# Patient Record
Sex: MALE | Race: WHITE | NOT HISPANIC OR LATINO | Employment: OTHER | URBAN - METROPOLITAN AREA
[De-identification: names, ages, dates, MRNs, and addresses within clinical notes are randomized per-mention and may not be internally consistent; named-entity substitution may affect disease eponyms.]

---

## 2017-04-18 ENCOUNTER — OFFICE VISIT (OUTPATIENT)
Dept: CARDIOLOGY | Facility: CLINIC | Age: 82
End: 2017-04-18

## 2017-04-18 VITALS
HEIGHT: 70 IN | HEART RATE: 82 BPM | WEIGHT: 187 LBS | BODY MASS INDEX: 26.77 KG/M2 | DIASTOLIC BLOOD PRESSURE: 72 MMHG | SYSTOLIC BLOOD PRESSURE: 118 MMHG

## 2017-04-18 DIAGNOSIS — Z95.0 PACEMAKER: ICD-10-CM

## 2017-04-18 DIAGNOSIS — I48.20 CHRONIC ATRIAL FIBRILLATION (HCC): Primary | ICD-10-CM

## 2017-04-18 NOTE — PROGRESS NOTES
"Primary Provider:  Anthony Fried MD  CC:Afib    Problem List:    Patient Active Problem List   Diagnosis   • Atrial fibrillation   • Syncopal episodes   • Hypertension   • Dyslipidemia   • Diabetes mellitus, type 2   • Right bundle branch block   • DJD (degenerative joint disease)   • CKD (chronic kidney disease)   • Ventral hernia   • CANDY on CPAP   • Pacemaker       HPI:  The patient returns for follow up regarding afib, pacemaker.  He denies any chest pain, sob, or CHF symptoms.     No Known Allergies    Current Outpatient Prescriptions   Medication Sig Dispense Refill   • amLODIPine (NORVASC) 10 MG tablet Take 10 mg by mouth Daily.  0   • aspirin 81 MG tablet Take 81 mg by mouth Daily.     • benazepril (LOTENSIN) 20 MG tablet Take 20 mg by mouth Daily.  1   • fenofibrate 160 MG tablet Take 160 mg by mouth Daily.  0   • hydrochlorothiazide (HYDRODIURIL) 25 MG tablet Take 25 mg by mouth Daily.  1   • metFORMIN XR (GLUCOPHAGE-XR) 500 MG 24 hr tablet Take 500 mg by mouth Daily.  1   • oxyCODONE-acetaminophen (PERCOCET) 5-325 MG per tablet As Needed.  0   • pravastatin (PRAVACHOL) 20 MG tablet Take 20 mg by mouth Daily.  1   • XARELTO 15 MG tablet Take 1 tablet by mouth Daily With Dinner. 30 tablet 5     No current facility-administered medications for this visit.        /72 (BP Location: Right arm, Patient Position: Sitting)  Pulse 82  Ht 70\" (177.8 cm)  Wt 187 lb (84.8 kg)  BMI 26.83 kg/m2      Device Check    Company BSC  Mode VVIR  Lower Rate 80bpm  Upper rate 120bpm         Thresholds  Right Ventricular Pacing 0.7Volts@0.5ms  Right Ventricular Sensing 7.5mV  Right Ventricular Impedence 609Ohms  Percent Pacing 97    Battery Voltage -Volts  Longevity Eight years  Episodes Afib    Reprogramming No changes    Comments Stable pacemaker check.     Diagnosis Plan   1. Chronic atrial fibrillation  Stable pacemaker check.  Will recheck in 6 months or sooner as needed.    2. Pacemaker       Will Garcia ZHANG"

## 2017-05-12 ENCOUNTER — OFFICE VISIT (OUTPATIENT)
Dept: CARDIOLOGY | Facility: CLINIC | Age: 82
End: 2017-05-12

## 2017-05-12 VITALS
HEIGHT: 70 IN | WEIGHT: 185.4 LBS | SYSTOLIC BLOOD PRESSURE: 116 MMHG | BODY MASS INDEX: 26.54 KG/M2 | HEART RATE: 97 BPM | DIASTOLIC BLOOD PRESSURE: 86 MMHG

## 2017-05-12 DIAGNOSIS — R55 SYNCOPE, UNSPECIFIED SYNCOPE TYPE: ICD-10-CM

## 2017-05-12 DIAGNOSIS — G47.33 OSA ON CPAP: ICD-10-CM

## 2017-05-12 DIAGNOSIS — Z99.89 OSA ON CPAP: ICD-10-CM

## 2017-05-12 DIAGNOSIS — I48.20 CHRONIC ATRIAL FIBRILLATION (HCC): Primary | ICD-10-CM

## 2017-05-12 DIAGNOSIS — E78.5 DYSLIPIDEMIA: ICD-10-CM

## 2017-05-12 DIAGNOSIS — Z95.0 PACEMAKER: ICD-10-CM

## 2017-05-12 DIAGNOSIS — N18.3 CKD (CHRONIC KIDNEY DISEASE), STAGE 3 (MODERATE): ICD-10-CM

## 2017-05-12 DIAGNOSIS — E11.8 TYPE 2 DIABETES MELLITUS WITH COMPLICATION, WITHOUT LONG-TERM CURRENT USE OF INSULIN (HCC): ICD-10-CM

## 2017-05-12 PROCEDURE — 99213 OFFICE O/P EST LOW 20 MIN: CPT | Performed by: INTERNAL MEDICINE

## 2017-08-18 RX ORDER — RIVAROXABAN 15 MG/1
TABLET, FILM COATED ORAL
Qty: 30 TABLET | Refills: 5 | Status: SHIPPED | OUTPATIENT
Start: 2017-08-18 | End: 2018-01-01 | Stop reason: SDUPTHER

## 2017-08-24 ENCOUNTER — CLINICAL SUPPORT NO REQUIREMENTS (OUTPATIENT)
Dept: CARDIOLOGY | Facility: CLINIC | Age: 82
End: 2017-08-24

## 2017-08-24 DIAGNOSIS — I48.91 ATRIAL FIBRILLATION, UNSPECIFIED TYPE (HCC): ICD-10-CM

## 2017-08-24 PROCEDURE — 93296 REM INTERROG EVL PM/IDS: CPT | Performed by: INTERNAL MEDICINE

## 2017-08-24 PROCEDURE — 93294 REM INTERROG EVL PM/LDLS PM: CPT | Performed by: INTERNAL MEDICINE

## 2017-11-29 ENCOUNTER — OFFICE VISIT (OUTPATIENT)
Dept: CARDIOLOGY | Facility: CLINIC | Age: 82
End: 2017-11-29

## 2017-11-29 VITALS
DIASTOLIC BLOOD PRESSURE: 74 MMHG | HEIGHT: 70 IN | HEART RATE: 85 BPM | WEIGHT: 193.8 LBS | SYSTOLIC BLOOD PRESSURE: 118 MMHG | BODY MASS INDEX: 27.75 KG/M2

## 2017-11-29 DIAGNOSIS — Z99.89 OSA ON CPAP: ICD-10-CM

## 2017-11-29 DIAGNOSIS — G47.33 OSA ON CPAP: ICD-10-CM

## 2017-11-29 DIAGNOSIS — N18.30 STAGE 3 CHRONIC KIDNEY DISEASE (HCC): ICD-10-CM

## 2017-11-29 DIAGNOSIS — I49.5 SICK SINUS SYNDROME (HCC): Primary | ICD-10-CM

## 2017-11-29 DIAGNOSIS — E78.5 DYSLIPIDEMIA: ICD-10-CM

## 2017-11-29 DIAGNOSIS — E11.8 TYPE 2 DIABETES MELLITUS WITH COMPLICATION, WITHOUT LONG-TERM CURRENT USE OF INSULIN (HCC): ICD-10-CM

## 2017-11-29 PROCEDURE — 99214 OFFICE O/P EST MOD 30 MIN: CPT | Performed by: INTERNAL MEDICINE

## 2017-11-29 RX ORDER — ERYTHROMYCIN 5 MG/G
OINTMENT OPHTHALMIC NIGHTLY PRN
COMMUNITY
End: 2019-08-13

## 2017-11-29 NOTE — PROGRESS NOTES
Subjective:     Encounter Date:11/29/2017    Patient ID: Micha Crockett is an 82 y.o.  white male, a , from [place], [place].       PHYSICIAN: Anthony Fried MD  PREVIOUS CARDIOLOGIST: Gypsy Gibbs MD  ELECTROPHYSIOLOGIST: ROSIBEL  ORTHOPEDIST: Vitaly Omer MD    Chief Complaint:   Chief Complaint   Patient presents with   • Atrial Fibrillation     Problem List:  1. Remote paroxysmal atrial fibrillation with now chronic sustained atrial fibrillation and progressive sick sinus syndrome:  a. Diagnosed in [facility], in April 2014.   b. CHADS-VASc score of 4 with anticoagulation on Xarelto.  c. Successful cardioversion with restoration of sinus rhythm with advanced trifascicular block with SD interval of 292 msec in the setting of CRBB/LAHB, on 04/29/2014, by Dr. Slaughter.   d. Return of atrial fibrillation with Holter monitor.  e. Holter monitor for 48 hours with a minimum heart rate of 42 beats per minute and max of 138 beats per minute, with ventricular ectopy less than 1% of the time and supraventricular ectopy 2% of the time, 03/27/2015.  f. Echocardiogram showing estimated EF of 61% with mild-to-moderate LVH and left atrium slightly dilated, and right ventricle slightly dilated, and moderate aortic cuff sclerosis, and mild aortic regurgitation with mild mitral regurgitation, and mild-to-moderate tricuspid regurgitation.  g. Implantation of [employer] single-chamber permanent pacemaker on 07/13/2015 by Dr. Cano, with acceptable interrogation and no reprogramming, April 2017, with subsequent acceptable remote check, August 2017.  2. Syncopal episodes secondary to bradycardia.   3. Hypertension.   4. Dyslipidemia.   5. Diabetes mellitus type 2.   6. Chronic right bundle branch block.   7. Degenerative joint disease.   8. Ventral hernia.   9. Chronic kidney disease.   10. Obstructive sleep apnea with the use of CPAP.   11. Surgical  history:  a. Appendectomy.   b. Pilonidal cyst.  c. 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  d. Prostatectomy.       No Known Allergies      Current Outpatient Prescriptions:   •  amLODIPine (NORVASC) 10 MG tablet, Take 10 mg by mouth Daily., Disp: , Rfl: 0  •  aspirin 81 MG tablet, Take 81 mg by mouth Daily., Disp: , Rfl:   •  benazepril (LOTENSIN) 20 MG tablet, Take 20 mg by mouth Daily., Disp: , Rfl: 1  •  betamethasone valerate (VALISONE) 0.1 % cream, APPLY TO AFFECTED AREA 3 TIMES A DAY AND AS NEEDED FOR ITCHING, Disp: , Rfl: 0  •  erythromycin (ROMYCIN) 5 MG/GM ophthalmic ointment, Administer  to both eyes At Night As Needed., Disp: , Rfl:   •  fenofibrate 160 MG tablet, Take 160 mg by mouth Daily., Disp: , Rfl: 0  •  hydrochlorothiazide (HYDRODIURIL) 25 MG tablet, Take 12.5 mg by mouth Daily., Disp: , Rfl: 1  •  metFORMIN XR (GLUCOPHAGE-XR) 500 MG 24 hr tablet, Take 500 mg by mouth Daily., Disp: , Rfl: 1  •  pravastatin (PRAVACHOL) 20 MG tablet, Take 20 mg by mouth Daily., Disp: , Rfl: 1  •  XARELTO 15 MG tablet, TAKE 1 TABLET BY MOUTH DAILY WITH DINNER, Disp: 30 tablet, Rfl: 5    HISTORY OF PRESENT ILLNESS: Patient returns for scheduled 6-month followup. He states that his garden did well this year.  His wife accompanies him today, and she says that she went to Dr. Fried's office and picked up her 's lab results from earlier this month and put it in the glove compartment, but it is not there.  They will try to get another copy and forward that to our office.  The patient notes that Dr. Fried was pleased with the results.  He has had no symptoms of chest pain, tightness, or pressure with his activities.  He has no symptoms from a cardiopulmonary perspective with his daily activities.  He does not complain of any edema.  He has had influenza immunization this fall.  Patient otherwise denies chest pain, shortness of breath, PND, edema, palpitations, syncope or presyncope at this  "time.        ROS   Obtained and otherwise negative except as outlined in problem list and HPI.    Procedures       Objective:       Vitals:    11/29/17 1502 11/29/17 1505   BP: 123/78 118/74   BP Location: Left arm Left arm   Patient Position: Standing Sitting   Pulse: 96 85   Weight: 193 lb 12.8 oz (87.9 kg)    Height: 70\" (177.8 cm)      Body mass index is 27.81 kg/(m^2).   Last weight:  185 lbs.    Physical Exam   Constitutional: He is oriented to person, place, and time. He appears well-developed and well-nourished.   Neck: No JVD present. Carotid bruit is not present. No thyromegaly present.   Cardiovascular: Regular rhythm, S1 normal and S2 normal.  Exam reveals no gallop, no S3 and no friction rub.    Murmur heard.   Medium-pitched early systolic murmur is present with a grade of 2/6  at the lower left sternal border  Pulses:       Carotid pulses are 1+ on the right side, and 1+ on the left side.       Radial pulses are 1+ on the right side, and 1+ on the left side.        Femoral pulses are 1+ on the right side, and 1+ on the left side.       Popliteal pulses are 1+ on the right side, and 1+ on the left side.        Dorsalis pedis pulses are 1+ on the right side, and 1+ on the left side.        Posterior tibial pulses are 1+ on the right side, and 1+ on the left side.   Pulmonary/Chest: Effort normal. He has decreased breath sounds. He has no wheezes. He has no rhonchi. He has no rales.   Left precordial pacemaker site is nominal   Abdominal: Soft. He exhibits no mass. There is no hepatosplenomegaly. There is no tenderness. There is no guarding.   Bowel sounds audible x4   Musculoskeletal: Normal range of motion. He exhibits no edema.   Lymphadenopathy:     He has no cervical adenopathy.   Neurological: He is alert and oriented to person, place, and time.   Skin: Skin is warm, dry and intact. No rash noted.   Vitals reviewed.        Lab Review:   Lab Results   Component Value Date    GLUCOSE 107 (H) " 07/15/2015    BUN 28 (H) 07/15/2015    CREATININE 1.4 (H) 07/15/2015    CO2 29 07/15/2015    CALCIUM 8.8 07/15/2015    ALBUMIN 4.0 07/12/2015    AST 17 07/12/2015    ALT 16 07/12/2015       Lab Results   Component Value Date    WBC 10.47 07/14/2015    HGB 12.0 (L) 07/14/2015    HCT 37.3 (L) 07/14/2015    MCV 86.9 07/14/2015     07/14/2015       Lab Results   Component Value Date    HGBA1C 6.7 (H) 07/12/2015       Lab Results   Component Value Date    TSH 3.548 07/12/2015       Lab Results   Component Value Date     (H) 07/12/2015           Assessment:   Overall continued acceptable course with no interim cardiopulmonary complaints with acceptable functional status. We will defer additional diagnostic or therapeutic intervention from a cardiac perspective at this time.  Hopefully, we will be allowed to review his most recent laboratory results with him by letter.       Diagnosis Plan   1. Sick sinus syndrome  Acceptable course   2. CANDY on CPAP  Compliance stressed   3. Type 2 diabetes mellitus with complication, without long-term current use of insulin  No data to review   4. Dyslipidemia  No data to review   5. Stage 3 chronic kidney disease  No data to review          Plan:         1. Patient to continue current medications and close follow up with the above providers.  2. Tentative cardiology follow up in May 2018 with a pacemaker check, or patient may return sooner PRN.       Transcribed by Mirna Damon for Dr. Daniel Slaughter at 3:17 PM on 11/29/2017    I, Daniel Slaughter MD, [facility], personally performed the services described in this documentation as scribed by the above named individual in my presence, and it is both accurate and complete. At 3:59 PM on 11/29/2017

## 2018-01-02 RX ORDER — RIVAROXABAN 15 MG/1
TABLET, FILM COATED ORAL
Qty: 30 TABLET | Refills: 3 | Status: SHIPPED | OUTPATIENT
Start: 2018-01-02 | End: 2018-05-08 | Stop reason: SDUPTHER

## 2018-02-22 ENCOUNTER — CLINICAL SUPPORT NO REQUIREMENTS (OUTPATIENT)
Dept: CARDIOLOGY | Facility: CLINIC | Age: 83
End: 2018-02-22

## 2018-02-22 DIAGNOSIS — I48.20 CHRONIC ATRIAL FIBRILLATION (HCC): ICD-10-CM

## 2018-02-22 DIAGNOSIS — I49.5 SICK SINUS SYNDROME (HCC): ICD-10-CM

## 2018-02-22 PROCEDURE — 93294 REM INTERROG EVL PM/LDLS PM: CPT | Performed by: INTERNAL MEDICINE

## 2018-02-22 PROCEDURE — 93296 REM INTERROG EVL PM/IDS: CPT | Performed by: INTERNAL MEDICINE

## 2018-03-28 ENCOUNTER — ANESTHESIA EVENT (OUTPATIENT)
Dept: ENDOSCOPY | Age: 83
End: 2018-03-28
Payer: MEDICARE

## 2018-03-29 ENCOUNTER — ANESTHESIA (OUTPATIENT)
Dept: ENDOSCOPY | Age: 83
End: 2018-03-29
Payer: MEDICARE

## 2018-03-29 ENCOUNTER — HOSPITAL ENCOUNTER (OUTPATIENT)
Age: 83
Setting detail: OUTPATIENT SURGERY
Discharge: HOME | End: 2018-03-29
Attending: INTERNAL MEDICINE | Admitting: INTERNAL MEDICINE
Payer: MEDICARE

## 2018-03-29 VITALS
TEMPERATURE: 96.8 F | BODY MASS INDEX: 27.3 KG/M2 | DIASTOLIC BLOOD PRESSURE: 70 MMHG | SYSTOLIC BLOOD PRESSURE: 135 MMHG | OXYGEN SATURATION: 97 % | WEIGHT: 195 LBS | RESPIRATION RATE: 20 BRPM | HEIGHT: 71 IN | HEART RATE: 70 BPM

## 2018-03-29 LAB
ANION GAP SERPL CALC-SCNC: 8 MMOL/L (ref 3–18)
APPEARANCE UR: CLEAR
BACTERIA URNS QL MICRO: ABNORMAL /HPF
BILIRUB UR QL: NEGATIVE
BUN BLD-MCNC: 26 MG/DL (ref 7–18)
BUN SERPL-MCNC: 17 MG/DL (ref 7–18)
BUN/CREAT SERPL: 13 (ref 12–20)
CALCIUM SERPL-MCNC: 10.7 MG/DL (ref 8.5–10.1)
CHLORIDE BLD-SCNC: 101 MMOL/L (ref 100–108)
CHLORIDE SERPL-SCNC: 104 MMOL/L (ref 100–108)
CO2 SERPL-SCNC: 27 MMOL/L (ref 21–32)
COLOR UR: YELLOW
CREAT SERPL-MCNC: 1.27 MG/DL (ref 0.6–1.3)
EPITH CASTS URNS QL MICRO: ABNORMAL /LPF (ref 0–5)
GLUCOSE BLD STRIP.AUTO-MCNC: 129 MG/DL (ref 74–106)
GLUCOSE SERPL-MCNC: 130 MG/DL (ref 74–99)
GLUCOSE UR STRIP.AUTO-MCNC: NEGATIVE MG/DL
HCT VFR BLD CALC: 38 % (ref 36–49)
HGB BLD-MCNC: 12.9 G/DL (ref 12–16)
HGB UR QL STRIP: NEGATIVE
HYALINE CASTS URNS QL MICRO: ABNORMAL /LPF (ref 0–2)
INR BLD: 1.2 (ref 0.9–1.2)
INR PPP: 1.3 (ref 0.8–1.2)
KETONES UR QL STRIP.AUTO: NEGATIVE MG/DL
LEUKOCYTE ESTERASE UR QL STRIP.AUTO: ABNORMAL
NITRITE UR QL STRIP.AUTO: NEGATIVE
PH UR STRIP: 5.5 [PH] (ref 5–8)
POTASSIUM BLD-SCNC: 7.8 MMOL/L (ref 3.5–5.5)
POTASSIUM SERPL-SCNC: 4 MMOL/L (ref 3.5–5.5)
PROT UR STRIP-MCNC: NEGATIVE MG/DL
PROTHROMBIN TIME: 15.5 SEC (ref 11.5–15.2)
RBC #/AREA URNS HPF: NEGATIVE /HPF (ref 0–5)
SODIUM BLD-SCNC: 136 MMOL/L (ref 136–145)
SODIUM SERPL-SCNC: 139 MMOL/L (ref 136–145)
SP GR UR REFRACTOMETRY: 1.01 (ref 1–1.03)
UROBILINOGEN UR QL STRIP.AUTO: 0.2 EU/DL (ref 0.2–1)
WBC URNS QL MICRO: ABNORMAL /HPF (ref 0–4)

## 2018-03-29 PROCEDURE — 77030029384 HC SNR POLYP CAPTVR II BSC -B: Performed by: INTERNAL MEDICINE

## 2018-03-29 PROCEDURE — 87086 URINE CULTURE/COLONY COUNT: CPT | Performed by: INTERNAL MEDICINE

## 2018-03-29 PROCEDURE — 77030010936 HC CLP LIG BSC -C: Performed by: INTERNAL MEDICINE

## 2018-03-29 PROCEDURE — 74011250636 HC RX REV CODE- 250/636: Performed by: NURSE ANESTHETIST, CERTIFIED REGISTERED

## 2018-03-29 PROCEDURE — 77030008565 HC TBNG SUC IRR ERBE -B: Performed by: INTERNAL MEDICINE

## 2018-03-29 PROCEDURE — 84295 ASSAY OF SERUM SODIUM: CPT

## 2018-03-29 PROCEDURE — 74011000250 HC RX REV CODE- 250: Performed by: NURSE ANESTHETIST, CERTIFIED REGISTERED

## 2018-03-29 PROCEDURE — 77030009426 HC FCPS BIOP ENDOSC BSC -B: Performed by: INTERNAL MEDICINE

## 2018-03-29 PROCEDURE — 76060000032 HC ANESTHESIA 0.5 TO 1 HR: Performed by: INTERNAL MEDICINE

## 2018-03-29 PROCEDURE — 76040000007: Performed by: INTERNAL MEDICINE

## 2018-03-29 PROCEDURE — 81001 URINALYSIS AUTO W/SCOPE: CPT | Performed by: INTERNAL MEDICINE

## 2018-03-29 PROCEDURE — 77030018846 HC SOL IRR STRL H20 ICUM -A: Performed by: INTERNAL MEDICINE

## 2018-03-29 PROCEDURE — 85610 PROTHROMBIN TIME: CPT | Performed by: NURSE ANESTHETIST, CERTIFIED REGISTERED

## 2018-03-29 PROCEDURE — 80048 BASIC METABOLIC PNL TOTAL CA: CPT | Performed by: NURSE ANESTHETIST, CERTIFIED REGISTERED

## 2018-03-29 PROCEDURE — 74011250636 HC RX REV CODE- 250/636

## 2018-03-29 PROCEDURE — 88305 TISSUE EXAM BY PATHOLOGIST: CPT | Performed by: INTERNAL MEDICINE

## 2018-03-29 PROCEDURE — 85610 PROTHROMBIN TIME: CPT

## 2018-03-29 PROCEDURE — 77030038604 HC SNR ENDO EXACTO USEN -B: Performed by: INTERNAL MEDICINE

## 2018-03-29 RX ORDER — PROPOFOL 10 MG/ML
INJECTION, EMULSION INTRAVENOUS
Status: DISCONTINUED | OUTPATIENT
Start: 2018-03-29 | End: 2018-03-29 | Stop reason: HOSPADM

## 2018-03-29 RX ORDER — PROPOFOL 10 MG/ML
INJECTION, EMULSION INTRAVENOUS AS NEEDED
Status: DISCONTINUED | OUTPATIENT
Start: 2018-03-29 | End: 2018-03-29 | Stop reason: HOSPADM

## 2018-03-29 RX ORDER — DIPHENHYDRAMINE HYDROCHLORIDE 50 MG/ML
12.5 INJECTION, SOLUTION INTRAMUSCULAR; INTRAVENOUS
Status: DISCONTINUED | OUTPATIENT
Start: 2018-03-29 | End: 2018-03-29 | Stop reason: HOSPADM

## 2018-03-29 RX ORDER — SODIUM CHLORIDE 0.9 % (FLUSH) 0.9 %
5-10 SYRINGE (ML) INJECTION AS NEEDED
Status: DISCONTINUED | OUTPATIENT
Start: 2018-03-29 | End: 2018-03-29 | Stop reason: HOSPADM

## 2018-03-29 RX ORDER — LIDOCAINE HYDROCHLORIDE 10 MG/ML
0.1 INJECTION, SOLUTION EPIDURAL; INFILTRATION; INTRACAUDAL; PERINEURAL AS NEEDED
Status: DISCONTINUED | OUTPATIENT
Start: 2018-03-29 | End: 2018-03-29 | Stop reason: HOSPADM

## 2018-03-29 RX ORDER — ONDANSETRON 2 MG/ML
4 INJECTION INTRAMUSCULAR; INTRAVENOUS ONCE
Status: DISCONTINUED | OUTPATIENT
Start: 2018-03-29 | End: 2018-03-29 | Stop reason: HOSPADM

## 2018-03-29 RX ORDER — SODIUM CHLORIDE, SODIUM LACTATE, POTASSIUM CHLORIDE, CALCIUM CHLORIDE 600; 310; 30; 20 MG/100ML; MG/100ML; MG/100ML; MG/100ML
50 INJECTION, SOLUTION INTRAVENOUS CONTINUOUS
Status: DISCONTINUED | OUTPATIENT
Start: 2018-03-29 | End: 2018-03-29 | Stop reason: HOSPADM

## 2018-03-29 RX ORDER — SODIUM CHLORIDE, SODIUM LACTATE, POTASSIUM CHLORIDE, CALCIUM CHLORIDE 600; 310; 30; 20 MG/100ML; MG/100ML; MG/100ML; MG/100ML
100 INJECTION, SOLUTION INTRAVENOUS CONTINUOUS
Status: DISCONTINUED | OUTPATIENT
Start: 2018-03-29 | End: 2018-03-29 | Stop reason: HOSPADM

## 2018-03-29 RX ORDER — SODIUM CHLORIDE, SODIUM LACTATE, POTASSIUM CHLORIDE, CALCIUM CHLORIDE 600; 310; 30; 20 MG/100ML; MG/100ML; MG/100ML; MG/100ML
INJECTION, SOLUTION INTRAVENOUS
Status: DISCONTINUED | OUTPATIENT
Start: 2018-03-29 | End: 2018-03-29 | Stop reason: HOSPADM

## 2018-03-29 RX ADMIN — PROPOFOL 150 MCG/KG/MIN: 10 INJECTION, EMULSION INTRAVENOUS at 10:48

## 2018-03-29 RX ADMIN — SODIUM CHLORIDE, SODIUM LACTATE, POTASSIUM CHLORIDE, AND CALCIUM CHLORIDE 50 ML/HR: 600; 310; 30; 20 INJECTION, SOLUTION INTRAVENOUS at 10:00

## 2018-03-29 RX ADMIN — PROPOFOL 40 MG: 10 INJECTION, EMULSION INTRAVENOUS at 10:48

## 2018-03-29 RX ADMIN — SODIUM CHLORIDE, SODIUM LACTATE, POTASSIUM CHLORIDE, CALCIUM CHLORIDE: 600; 310; 30; 20 INJECTION, SOLUTION INTRAVENOUS at 10:42

## 2018-03-29 RX ADMIN — PROPOFOL 20 MG: 10 INJECTION, EMULSION INTRAVENOUS at 10:50

## 2018-03-29 RX ADMIN — FAMOTIDINE 20 MG: 10 INJECTION INTRAVENOUS at 10:10

## 2018-03-29 NOTE — PROCEDURES
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 Caroline Page MD    Assistant(s): Endoscopy Technician-1: Ashli Roper RN-1: Sindi Burris; Yuli Gasca RN    EBL:None    Specimens:   ID Type Source Tests Collected by Time Destination   1 : rectal polyps Preservative Rectum  Caroline Page MD 3/29/2018 1052 Pathology   2 : sigmoid polyp Preservative Sigmoid  Caroline Page MD 3/29/2018 1058 Pathology   3 : cecal polyps Preservative Cecum  Caroline Page MD 3/29/2018 1117 Pathology   4 : transverse polyps Preservative Colon, Transverse  Caroline Page MD 3/29/2018 1118 Pathology       Findings: See printed and scanned procedure note    Complications: None    Dr. Caroline Page MD  3/29/2018  11:45 AM    Caroline Page MD  [facility] [address] [phone]  [email or web address]

## 2018-03-29 NOTE — PERIOP NOTES
Per Dr. Jarrett Connolly pt may resume lovenox 03/30/18 and call clinic to get updated instructions on coumadin. Must hold coumadin for 72 hours. Patient and wife instructed.

## 2018-03-29 NOTE — ANESTHESIA PREPROCEDURE EVALUATION
Anesthetic History   No history of anesthetic complications            Review of Systems / Medical History  Patient summary reviewed and pertinent labs reviewed    Pulmonary  Within defined limits                 Neuro/Psych   Within defined limits           Cardiovascular      Valvular problems/murmurs: aortic stenosis        Pacemaker and CAD    Exercise tolerance: >4 METS  Comments: Porcine AO v replacement and 4v cabg   GI/Hepatic/Renal     GERD           Endo/Other  Within defined limits           Other Findings   Comments: Documentation of current medication  Current medications obtained, documented and obtained? YES      Risk Factors for Postoperative nausea/vomiting:       History of postoperative nausea/vomiting? Female? NO       Motion sickness? NO       Intended opioid administration for postoperative analgesia? NO      Smoking Abstinence:  Current Smoker? NO  Elective Surgery? YES  Seen preoperatively by anesthesiologist or proxy prior to day of surgery? YES  Pt abstained from smoking 24 hours prior to anesthesia?  N/A    Preventive care/screening for High Blood Pressure:  Aged 18 years and older: YES  Screened for high blood pressure: YES  Patients with high blood pressure referred to primary care provider   for BP management: YES                 Physical Exam    Airway  Mallampati: II  TM Distance: 4 - 6 cm  Neck ROM: normal range of motion   Mouth opening: Normal     Cardiovascular  Regular rate and rhythm,  S1 and S2 normal,  no murmur, click, rub, or gallop  Rhythm: regular  Rate: normal         Dental    Dentition: Lower dentition intact and Upper dentition intact     Pulmonary  Breath sounds clear to auscultation               Abdominal  GI exam deferred       Other Findings            Anesthetic Plan    ASA: 3  Anesthesia type: MAC          Induction: Intravenous  Anesthetic plan and risks discussed with: Patient

## 2018-03-29 NOTE — PROGRESS NOTES
Patient complaining of penile burning discomfort after procedure today. Unclear if this is in any way related to his colonoscopy, theoretically possible that the colonoscope could potentially exacerbate some pre-existing prostate inflammation. Suspect, however, that may be related to dehydration from bowel prep. Urinalysis with small LE, 0-3 WBC; sent for culture. Patient sees Dr. Michelle Estrada, Urology, for ongoing care - urgent message sent to my staff to arrange urgent follow up for patient with Dr. Adan Kessler. Abiodun Batista MD  [facility] [address] - [phone]  [email or web address]

## 2018-03-29 NOTE — DISCHARGE INSTRUCTIONS
Colonoscopy: What to Expect at [address] [place] [address]  After you have a colonoscopy, you will stay at the clinic for 1 to 2 hours until the medicines wear off. Then you can go home. But you will need to arrange for a ride. Your doctor will tell you when you can eat and do your other usual activities. Your doctor will talk to you about when you will need your next colonoscopy. Your doctor can help you decide how often you need to be checked. This will depend on the results of your test and your risk for colorectal cancer. After the test, you may be bloated or have gas pains. You may need to pass gas. If a biopsy was done or a polyp was removed, you may have streaks of blood in your stool (feces) for a few days. This care sheet gives you a general idea about how long it will take for you to recover. But each person recovers at a different pace. Follow the steps below to get better as quickly as possible. How can you care for yourself at home? Activity  ? · Rest when you feel tired. ? · You can do your normal activities when it feels okay to do so. Diet  ? · Follow your doctor's directions for eating. ? · Unless your doctor has told you not to, drink plenty of fluids. This helps to replace the fluids that were lost during the colon prep. ? · Do not drink alcohol. Medicines  ? · Your doctor will tell you if and when you can restart your medicines. He or she will also give you instructions about taking any new medicines. ? · If you take blood thinners, such as warfarin (Coumadin), clopidogrel (Plavix), or aspirin, be sure to talk to your doctor. He or she will tell you if and when to start taking those medicines again. Make sure that you understand exactly what your doctor wants you to do. ? · If polyps were removed or a biopsy was done during the test, your doctor may tell you not to take aspirin or other anti-inflammatory medicines for a few days.  These include ibuprofen (Advil, Motrin) and naproxen (Kenisha). Other instructions  ? · For your safety, do not drive or operate machinery until the medicine wears off and you can think clearly. Your doctor may tell you not to drive or operate machinery until the day after your test.   ? · Do not sign legal documents or make major decisions until the medicine wears off and you can think clearly. The anesthesia can make it hard for you to fully understand what you are agreeing to. Follow-up care is a key part of your treatment and safety. Be sure to make and go to all appointments, and call your doctor if you are having problems. It's also a good idea to know your test results and keep a list of the medicines you take. When should you call for help? Call 911 anytime you think you may need emergency care. For example, call if:  ? · You passed out (lost consciousness). ? · You pass maroon or bloody stools. ? · You have trouble breathing. ?Call your doctor now or seek immediate medical care if:  ? · You have pain that does not get better after you take pain medicine. ? · You are sick to your stomach or cannot drink fluids. ? · You have new or worse belly pain. ? · You have blood in your stools. ? · You have a fever. ? · You cannot pass stools or gas. ? Watch closely for changes in your health, and be sure to contact your doctor if you have any problems. Where can you learn more? Go to http://dulce maria-bran.info/. Enter E264 in the search box to learn more about \"Colonoscopy: What to Expect at Home. \"  Current as of: May 12, 2017  Content Version: 11.4  © 2562-3942 [employer]. Care instructions adapted under license by [employer] (which disclaims liability or warranty for this information). If you have questions about a medical condition or this instruction, always ask your healthcare professional. [address] any warranty or liability for your use of this information.   Colon Polyps: Care Instructions  Your Care Instructions    Colon polyps are growths in the colon or the rectum. The cause of most colon polyps is not known, and most people who get them do not have any problems. But a certain kind can turn into cancer. For this reason, regular testing for colon polyps is important for people age 48 and older and anyone who has an increased risk for colon cancer. Polyps are usually found through routine colon cancer screening tests. Although most colon polyps are not cancerous, they are usually removed and then tested for cancer. Screening for colon cancer saves lives because the cancer can usually be cured if it is caught early. If you have a polyp that is the type that can turn into cancer, you may need more tests to examine your entire colon. The doctor will remove any other polyps that he or she finds, and you will be tested more often. Follow-up care is a key part of your treatment and safety. Be sure to make and go to all appointments, and call your doctor if you are having problems. It's also a good idea to know your test results and keep a list of the medicines you take. How can you care for yourself at home? Regular exams to look for colon polyps are the best way to prevent polyps from turning into colon cancer. These can include stool tests, sigmoidoscopy, colonoscopy, and CT colonography. Talk with your doctor about a testing schedule that is right for you. To prevent polyps  There is no home treatment that can prevent colon polyps. But these steps may help lower your risk for cancer. · Stay active. Being active can help you get to and stay at a healthy weight. Try to exercise on most days of the week. Walking is a good choice. · Eat well. Choose a variety of vegetables, fruits, legumes (such as peas and beans), fish, poultry, and whole grains. · Do not smoke. If you need help quitting, talk to your doctor about stop-smoking programs and medicines.  These can increase your chances of quitting for good. · If you drink alcohol, limit how much you drink. Limit alcohol to 2 drinks a day for men and 1 drink a day for women. When should you call for help? Call your doctor now or seek immediate medical care if:  ? · You have severe belly pain. ? · Your stools are maroon or very bloody. ? Watch closely for changes in your health, and be sure to contact your doctor if:  ? · You have a fever. ? · You have nausea or vomiting. ? · You have a change in bowel habits (new constipation or diarrhea). ? · Your symptoms get worse or are not improving as expected. Where can you learn more? Go to http://dulce maria-bran.info/. Enter [id number] in the search box to learn more about \"Colon Polyps: Care Instructions. \"  Current as of: May 12, 2017  Content Version: 11.4  © 8954-9645 [employer]. Care instructions adapted under license by [employer] (which disclaims liability or warranty for this information). If you have questions about a medical condition or this instruction, always ask your healthcare professional. [place] [address] any warranty or liability for your use of this information. Hemorrhoids: Care Instructions  Your Care Instructions    Hemorrhoids are enlarged veins that develop in the anal canal. Bleeding during bowel movements, itching, swelling, and rectal pain are the most common symptoms. They can be uncomfortable at times, but hemorrhoids rarely are a serious problem. You can treat most hemorrhoids with simple changes to your diet and bowel habits. These changes include eating more fiber and not straining to pass stools. Most hemorrhoids do not need surgery or other treatment unless they are very large and painful or bleed a lot. Follow-up care is a key part of your treatment and safety. Be sure to make and go to all appointments, and call your doctor if you are having problems.  It's also a good idea to know your test results and keep a list of the medicines you take. How can you care for yourself at home? · Sit in a few inches of warm water (sitz bath) 3 times a day and after bowel movements. The warm water helps with pain and itching. · Put ice on your anal area several times a day for 10 minutes at a time. Put a thin cloth between the ice and your skin. Follow this by placing a warm, wet towel on the area for another 10 to 20 minutes. · Take pain medicines exactly as directed. ¨ If the doctor gave you a prescription medicine for pain, take it as prescribed. ¨ If you are not taking a prescription pain medicine, ask your doctor if you can take an over-the-counter medicine. · Keep the anal area clean, but be gentle. Use water and a fragrance-free soap, such as [place], or use baby wipes or medicated pads, such as Tucks. · Wear cotton underwear and loose clothing to decrease moisture in the anal area. · Eat more fiber. Include foods such as whole-grain breads and cereals, raw vegetables, raw and dried fruits, and beans. · Drink plenty of fluids, enough so that your urine is light yellow or clear like water. If you have kidney, heart, or liver disease and have to limit fluids, talk with your doctor before you increase the amount of fluids you drink. · Use a stool softener that contains bran or psyllium. You can save money by buying bran or psyllium (available in bulk at most health food stores) and sprinkling it on foods or stirring it into fruit juice. Or you can use a product such as Metamucil or Hydrocil. · Practice healthy bowel habits. ¨ Go to the bathroom as soon as you have the urge. ¨ Avoid straining to pass stools. Relax and give yourself time to let things happen naturally. ¨ Do not hold your breath while passing stools. ¨ Do not read while sitting on the toilet. Get off the toilet as soon as you have finished. · Take your medicines exactly as prescribed.  Call your doctor if you think you are having a problem with your medicine. When should you call for help? Call 911 anytime you think you may need emergency care. For example, call if:  ? · You pass maroon or very bloody stools. ?Call your doctor now or seek immediate medical care if:  ? · You have increased pain. ? · You have increased bleeding. ? Watch closely for changes in your health, and be sure to contact your doctor if:  ? · Your symptoms have not improved after 3 or 4 days. Where can you learn more? Go to http://dulce maria-bran.info/. Enter F228 in the search box to learn more about \"Hemorrhoids: Care Instructions. \"  Current as of: May 12, 2017  Content Version: 11.4  © 5762-9810 [employer]. Care instructions adapted under license by [employer] (which disclaims liability or warranty for this information). If you have questions about a medical condition or this instruction, always ask your healthcare professional. [place] [address] any warranty or liability for your use of this information. Diverticulosis: Care Instructions  Your Care Instructions  In diverticulosis, pouches called diverticula form in the wall of the large intestine (colon). The pouches do not cause any pain or other symptoms. Most people who have diverticulosis do not know they have it. But the pouches sometimes bleed, and if they become infected, they can cause pain and other symptoms. When this happens, it is called diverticulitis. Diverticula form when pressure pushes the wall of the colon outward at certain weak points. A diet that is too low in fiber can cause diverticula. Follow-up care is a key part of your treatment and safety. Be sure to make and go to all appointments, and call your doctor if you are having problems. It's also a good idea to know your test results and keep a list of the medicines you take. How can you care for yourself at home?   · Include fruits, leafy green vegetables, beans, and whole grains in your diet each day. These foods are high in fiber. · Take a fiber supplement, such as Citrucel or Metamucil, every day if needed. Read and follow all instructions on the label. · Drink plenty of fluids, enough so that your urine is light yellow or clear like water. If you have kidney, heart, or liver disease and have to limit fluids, talk with your doctor before you increase the amount of fluids you drink. · Get at least 30 minutes of exercise on most days of the week. Walking is a good choice. You also may want to do other activities, such as running, swimming, cycling, or playing tennis or team sports. · Cut out foods that cause gas, pain, or other symptoms. When should you call for help? Call your doctor now or seek immediate medical care if:  ? · You have belly pain. ? · You pass maroon or very bloody stools. ? · You have a fever. ? · You have nausea and vomiting. ? · You have unusual changes in your bowel movements or abdominal swelling. ? · You have burning pain when you urinate. ? · You have abnormal vaginal discharge. ? · You have shoulder pain. ? · You have cramping pain that does not get better when you have a bowel movement or pass gas. ? · You pass gas or stool from your urethra while urinating. ? Watch closely for changes in your health, and be sure to contact your doctor if you have any problems. Where can you learn more? Go to http://dulce maria-bran.info/. Enter M817 in the search box to learn more about \"Diverticulosis: Care Instructions. \"  Current as of: May 12, 2017  Content Version: 11.4  © 4598-8993 [employer]. Care instructions adapted under license by [employer] (which disclaims liability or warranty for this information).  If you have questions about a medical condition or this instruction, always ask your healthcare professional. [address] any warranty or liability for your use of this information. DISCHARGE SUMMARY from Nurse    PATIENT INSTRUCTIONS:    After general anesthesia or intravenous sedation, for 24 hours or while taking prescription Narcotics:  · Limit your activities  · Do not drive and operate hazardous machinery  · Do not make important personal or business decisions  · Do  not drink alcoholic beverages  · If you have not urinated within 8 hours after discharge, please contact your surgeon on call. Report the following to your surgeon:  · Excessive pain, swelling, redness or odor of or around the surgical area  · Temperature over 100.5  · Nausea and vomiting lasting longer than 4 hours or if unable to take medications  · Any signs of decreased circulation or nerve impairment to extremity: change in color, persistent  numbness, tingling, coldness or increase pain  · Any questions    *  Please give a list of your current medications to your Primary Care Provider. *  Please update this list whenever your medications are discontinued, doses are      changed, or new medications (including over-the-counter products) are added. *  Please carry medication information at all times in case of emergency situations. These are general instructions for a healthy lifestyle:    No smoking/ No tobacco products/ Avoid exposure to second hand smoke  Surgeon General's Warning:  Quitting smoking now greatly reduces serious risk to your health. Obesity, smoking, and sedentary lifestyle greatly increases your risk for illness    A healthy diet, regular physical exercise & weight monitoring are important for maintaining a healthy lifestyle    You may be retaining fluid if you have a history of heart failure or if you experience any of the following symptoms:  Weight gain of 3 pounds or more overnight or 5 pounds in a week, increased swelling in our hands or feet or shortness of breath while lying flat in bed.   Please call your doctor as soon as you notice any of these symptoms; do not wait until your next office visit. Recognize signs and symptoms of STROKE:    F-face looks uneven    A-arms unable to move or move unevenly    S-speech slurred or non-existent    T-time-call 911 as soon as signs and symptoms begin-DO NOT go       Back to bed or wait to see if you get better-TIME IS BRAIN. Warning Signs of HEART ATTACK     Call 911 if you have these symptoms:   Chest discomfort. Most heart attacks involve discomfort in the center of the chest that lasts more than a few minutes, or that goes away and comes back. It can feel like uncomfortable pressure, squeezing, fullness, or pain.  Discomfort in other areas of the upper body. Symptoms can include pain or discomfort in one or both arms, the back, neck, jaw, or stomach.  Shortness of breath with or without chest discomfort.  Other signs may include breaking out in a cold sweat, nausea, or lightheadedness. Don't wait more than five minutes to call 911 - MINUTES MATTER! Fast action can save your life. Calling 911 is almost always the fastest way to get lifesaving treatment. Emergency Medical Services staff can begin treatment when they arrive -- up to an hour sooner than if someone gets to the hospital by car. The discharge information has been reviewed with the patient and spouse. The patient and spouse verbalized understanding. Discharge medications reviewed with the patient and spouse and appropriate educational materials and side effects teaching were provided.   ___________________________________________________________________________________________________________________________________

## 2018-03-29 NOTE — IP AVS SNAPSHOT
[address] [facility] 
 
 
 [address] [facility] 61 [facility] Patient: Ran Burgos MRN: [id number] About your hospitalization You were admitted on:  March 29, 2018 You last received care in the:  [facility] PHASE 2 RECOVERY You were discharged on:  March 29, 2018 Why you were hospitalized Your primary diagnosis was:  Not on File Follow-up Information Follow up With Details Comments Contact Info Anayeli Hackett MD   [address] [facility] [address] [facility] [address] 
[phone] Garett Membreno MD MD will call you. [address] [place] [address] [address] [facility] [address] 
[phone] Your Scheduled Appointments Friday March 30, 2018  9:15 AM EDT Any with Eleni Figueroa MD  
[facility] [address]  
[phone] Discharge Orders None A check stacia indicates which time of day the medication should be taken. My Medications CONTINUE taking these medications Instructions Each Dose to Equal  
 Morning Noon Evening Bedtime ACID REDUCER (FAMOTIDINE) PO Your last dose was: Your next dose is: Take  by mouth. allopurinol 300 mg tablet Commonly known as:  Bonna Lindy Your last dose was: Your next dose is:    
   
   
 daily (after dinner). aspirin 81 mg tablet Your last dose was: Your next dose is: Take 81 mg by mouth. 81 mg  
    
   
   
   
  
 atorvastatin 40 mg tablet Commonly known as:  LIPITOR Your last dose was: Your next dose is:    
   
   
 daily (after dinner). captopril 12.5 mg tablet Commonly known as:  CAPOTEN Your last dose was: Your next dose is:    
   
   
 two (2) times a day.  1/2 tab BID  
     
   
   
 carvedilol 3.125 mg tablet Commonly known as:  Carrolyn Peabody Your last dose was: Your next dose is:    
   
   
 two (2) times daily (with meals). cholecalciferol 1,000 unit Cap Commonly known as:  VITAMIN D3 Your last dose was: Your next dose is: Take  by mouth daily. FERROUS SULFATE PO Your last dose was: Your next dose is: Take  by mouth. fluticasone 50 mcg/actuation nasal spray Commonly known as:  Marcine Infield Your last dose was: Your next dose is:    
   
   
 instill 1 spray into each nostril twice a day LOVAZA PO Your last dose was: Your next dose is: Take  by mouth. 2 tabs in am and 1 tab at night NIACIN Your last dose was: Your next dose is:    
   
   
 by Does Not Apply route nightly. nitrofurantoin (macrocrystal-monohydrate) 100 mg capsule Commonly known as:  MACROBID Your last dose was: Your next dose is: Take 1 Cap by mouth two (2) times a day. 100 mg  
    
   
   
   
  
 omeprazole 20 mg capsule Commonly known as:  PRILOSEC Your last dose was: Your next dose is:    
   
   
      
   
   
   
  
 sertraline 50 mg tablet Commonly known as:  ZOLOFT Your last dose was: Your next dose is:    
   
   
 take 1 tablet by mouth once daily  
     
   
   
   
  
 tamsulosin 0.4 mg capsule Commonly known as:  FLOMAX Your last dose was: Your next dose is: Take 1 Cap by mouth daily (after dinner). Indications: benign prostatic hyperplasia with lower urinary tract sx  
 0.4 mg  
    
   
   
   
  
 warfarin 2 mg tablet Commonly known as:  COUMADIN Your last dose was: Your next dose is: 2 mg. 2mg every day except Tuesday and Thursday 3mg Tuesday and Thursday  
 2 mg ZETIA 10 mg tablet Generic drug:  ezetimibe Your last dose was: Your next dose is: Take  by mouth. Discharge Instructions Colonoscopy: What to Expect at [facility] Your Recovery After you have a colonoscopy, you will stay at the clinic for 1 to 2 hours until the medicines wear off. Then you can go home. But you will need to arrange for a ride. Your doctor will tell you when you can eat and do your other usual activities. Your doctor will talk to you about when you will need your next colonoscopy. Your doctor can help you decide how often you need to be checked. This will depend on the results of your test and your risk for colorectal cancer. After the test, you may be bloated or have gas pains. You may need to pass gas. If a biopsy was done or a polyp was removed, you may have streaks of blood in your stool (feces) for a few days. This care sheet gives you a general idea about how long it will take for you to recover. But each person recovers at a different pace. Follow the steps below to get better as quickly as possible. How can you care for yourself at home? Activity ? · Rest when you feel tired. ? · You can do your normal activities when it feels okay to do so. Diet ? · Follow your doctor's directions for eating. ? · Unless your doctor has told you not to, drink plenty of fluids. This helps to replace the fluids that were lost during the colon prep. ? · Do not drink alcohol. Medicines ? · Your doctor will tell you if and when you can restart your medicines. He or she will also give you instructions about taking any new medicines. ? · If you take blood thinners, such as warfarin (Coumadin), clopidogrel (Plavix), or aspirin, be sure to talk to your doctor.  He or she will tell you if and when to start taking those medicines again. Make sure that you understand exactly what your doctor wants you to do. ? · If polyps were removed or a biopsy was done during the test, your doctor may tell you not to take aspirin or other anti-inflammatory medicines for a few days. These include ibuprofen (Advil, Motrin) and naproxen (Aleve). Other instructions ? · For your safety, do not drive or operate machinery until the medicine wears off and you can think clearly. Your doctor may tell you not to drive or operate machinery until the day after your test.  
? · Do not sign legal documents or make major decisions until the medicine wears off and you can think clearly. The anesthesia can make it hard for you to fully understand what you are agreeing to. Follow-up care is a key part of your treatment and safety. Be sure to make and go to all appointments, and call your doctor if you are having problems. It's also a good idea to know your test results and keep a list of the medicines you take. When should you call for help? Call 911 anytime you think you may need emergency care. For example, call if: 
? · You passed out (lost consciousness). ? · You pass maroon or bloody stools. ? · You have trouble breathing. ?Call your doctor now or seek immediate medical care if: 
? · You have pain that does not get better after you take pain medicine. ? · You are sick to your stomach or cannot drink fluids. ? · You have new or worse belly pain. ? · You have blood in your stools. ? · You have a fever. ? · You cannot pass stools or gas. ? Watch closely for changes in your health, and be sure to contact your doctor if you have any problems. Where can you learn more? Go to http://dulce maria-bran.info/. Enter E264 in the search box to learn more about \"Colonoscopy: What to Expect at Home. \" Current as of: May 12, 2017 Content Version: 11.4 © 4927-1316 TRData. Care instructions adapted under license by [employer]U (formerly [employer]) (which disclaims liability or warranty for this information). If you have questions about a medical condition or this instruction, always ask your healthcare professional. [address] any warranty or liability for your use of this information. Colon Polyps: Care Instructions Your Care Instructions Colon polyps are growths in the colon or the rectum. The cause of most colon polyps is not known, and most people who get them do not have any problems. But a certain kind can turn into cancer. For this reason, regular testing for colon polyps is important for people age 48 and older and anyone who has an increased risk for colon cancer. Polyps are usually found through routine colon cancer screening tests. Although most colon polyps are not cancerous, they are usually removed and then tested for cancer. Screening for colon cancer saves lives because the cancer can usually be cured if it is caught early. If you have a polyp that is the type that can turn into cancer, you may need more tests to examine your entire colon. The doctor will remove any other polyps that he or she finds, and you will be tested more often. Follow-up care is a key part of your treatment and safety. Be sure to make and go to all appointments, and call your doctor if you are having problems. It's also a good idea to know your test results and keep a list of the medicines you take. How can you care for yourself at home? Regular exams to look for colon polyps are the best way to prevent polyps from turning into colon cancer. These can include stool tests, sigmoidoscopy, colonoscopy, and CT colonography. Talk with your doctor about a testing schedule that is right for you. To prevent polyps There is no home treatment that can prevent colon polyps. But these steps may help lower your risk for cancer. · Stay active. Being active can help you get to and stay at a healthy weight. Try to exercise on most days of the week. Walking is a good choice. · Eat well. Choose a variety of vegetables, fruits, legumes (such as peas and beans), fish, poultry, and whole grains. · Do not smoke. If you need help quitting, talk to your doctor about stop-smoking programs and medicines. These can increase your chances of quitting for good. · If you drink alcohol, limit how much you drink. Limit alcohol to 2 drinks a day for men and 1 drink a day for women. When should you call for help? Call your doctor now or seek immediate medical care if: 
? · You have severe belly pain. ? · Your stools are maroon or very bloody. ? Watch closely for changes in your health, and be sure to contact your doctor if: 
? · You have a fever. ? · You have nausea or vomiting. ? · You have a change in bowel habits (new constipation or diarrhea). ? · Your symptoms get worse or are not improving as expected. Where can you learn more? Go to http://dulce maria-bran.info/. Enter 95 [id number] in the search box to learn more about \"Colon Polyps: Care Instructions. \" Current as of: May 12, 2017 Content Version: 11.4 © 3271-7789 Venuu. Care instructions adapted under license by [employer]ach[employer] (which disclaims liability or warranty for this information). If you have questions about a medical condition or this instruction, always ask your healthcare professional. [place] [address] any warranty or liability for your use of this information. Hemorrhoids: Care Instructions Your Care Instructions Hemorrhoids are enlarged veins that develop in the anal canal. Bleeding during bowel movements, itching, swelling, and rectal pain are the most common symptoms. They can be uncomfortable at times, but hemorrhoids rarely are a serious problem. You can treat most hemorrhoids with simple changes to your diet and bowel habits. These changes include eating more fiber and not straining to pass stools. Most hemorrhoids do not need surgery or other treatment unless they are very large and painful or bleed a lot. Follow-up care is a key part of your treatment and safety. Be sure to make and go to all appointments, and call your doctor if you are having problems. It's also a good idea to know your test results and keep a list of the medicines you take. How can you care for yourself at home? · Sit in a few inches of warm water (sitz bath) 3 times a day and after bowel movements. The warm water helps with pain and itching. · Put ice on your anal area several times a day for 10 minutes at a time. Put a thin cloth between the ice and your skin. Follow this by placing a warm, wet towel on the area for another 10 to 20 minutes. · Take pain medicines exactly as directed. ¨ If the doctor gave you a prescription medicine for pain, take it as prescribed. ¨ If you are not taking a prescription pain medicine, ask your doctor if you can take an over-the-counter medicine. · Keep the anal area clean, but be gentle. Use water and a fragrance-free soap, such as [place], or use baby wipes or medicated pads, such as Tucks. · Wear cotton underwear and loose clothing to decrease moisture in the anal area. · Eat more fiber. Include foods such as whole-grain breads and cereals, raw vegetables, raw and dried fruits, and beans. · Drink plenty of fluids, enough so that your urine is light yellow or clear like water. If you have kidney, heart, or liver disease and have to limit fluids, talk with your doctor before you increase the amount of fluids you drink. · Use a stool softener that contains bran or psyllium. You can save money by buying bran or psyllium (available in bulk at most health food stores) and sprinkling it on foods or stirring it into fruit juice.  Or you can use a product such as Metamucil or Hydrocil. · Practice healthy bowel habits. ¨ Go to the bathroom as soon as you have the urge. ¨ Avoid straining to pass stools. Relax and give yourself time to let things happen naturally. ¨ Do not hold your breath while passing stools. ¨ Do not read while sitting on the toilet. Get off the toilet as soon as you have finished. · Take your medicines exactly as prescribed. Call your doctor if you think you are having a problem with your medicine. When should you call for help? Call 911 anytime you think you may need emergency care. For example, call if: 
? · You pass maroon or very bloody stools. ?Call your doctor now or seek immediate medical care if: 
? · You have increased pain. ? · You have increased bleeding. ? Watch closely for changes in your health, and be sure to contact your doctor if: 
? · Your symptoms have not improved after 3 or 4 days. Where can you learn more? Go to http://dulce maria-bran.info/. Enter F228 in the search box to learn more about \"Hemorrhoids: Care Instructions. \" Current as of: May 12, 2017 Content Version: 11.4 © 1045-3535 [employer] [address] [employer]. Care instructions adapted under license by PeerTrader (which disclaims liability or warranty for this information). If you have questions about a medical condition or this instruction, always ask your healthcare professional. [place] [address] any warranty or liability for your use of this information. Diverticulosis: Care Instructions Your Care Instructions In diverticulosis, pouches called diverticula form in the wall of the large intestine (colon). The pouches do not cause any pain or other symptoms. Most people who have diverticulosis do not know they have it. But the pouches sometimes bleed, and if they become infected, they can cause pain and other symptoms. When this happens, it is called diverticulitis. Diverticula form when pressure pushes the wall of the colon outward at certain weak points. A diet that is too low in fiber can cause diverticula. Follow-up care is a key part of your treatment and safety. Be sure to make and go to all appointments, and call your doctor if you are having problems. It's also a good idea to know your test results and keep a list of the medicines you take. How can you care for yourself at home? · Include fruits, leafy green vegetables, beans, and whole grains in your diet each day. These foods are high in fiber. · Take a fiber supplement, such as Citrucel or Metamucil, every day if needed. Read and follow all instructions on the label. · Drink plenty of fluids, enough so that your urine is light yellow or clear like water. If you have kidney, heart, or liver disease and have to limit fluids, talk with your doctor before you increase the amount of fluids you drink. · Get at least 30 minutes of exercise on most days of the week. Walking is a good choice. You also may want to do other activities, such as running, swimming, cycling, or playing tennis or team sports. · Cut out foods that cause gas, pain, or other symptoms. When should you call for help? Call your doctor now or seek immediate medical care if: 
? · You have belly pain. ? · You pass maroon or very bloody stools. ? · You have a fever. ? · You have nausea and vomiting. ? · You have unusual changes in your bowel movements or abdominal swelling. ? · You have burning pain when you urinate. ? · You have abnormal vaginal discharge. ? · You have shoulder pain. ? · You have cramping pain that does not get better when you have a bowel movement or pass gas. ? · You pass gas or stool from your urethra while urinating. ? Watch closely for changes in your health, and be sure to contact your doctor if you have any problems. Where can you learn more? Go to http://dulce maria-bran.info/. Enter S459 in the search box to learn more about \"Diverticulosis: Care Instructions. \" Current as of: May 12, 2017 Content Version: 11.4 © 8682-4744 [email or web address]. Care instructions adapted under license by LEAFER (which disclaims liability or warranty for this information). If you have questions about a medical condition or this instruction, always ask your healthcare professional. [address] any warranty or liability for your use of this information. DISCHARGE SUMMARY from Nurse PATIENT INSTRUCTIONS: 
 
 
F-face looks uneven A-arms unable to move or move unevenly S-speech slurred or non-existent T-time-call 911 as soon as signs and symptoms begin-DO NOT go Back to bed or wait to see if you get better-TIME IS BRAIN. Warning Signs of HEART ATTACK Call 911 if you have these symptoms: 
? Chest discomfort. Most heart attacks involve discomfort in the center of the chest that lasts more than a few minutes, or that goes away and comes back. It can feel like uncomfortable pressure, squeezing, fullness, or pain. ? Discomfort in other areas of the upper body. Symptoms can include pain or discomfort in one or both arms, the back, neck, jaw, or stomach. ? Shortness of breath with or without chest discomfort. ? Other signs may include breaking out in a cold sweat, nausea, or lightheadedness. Don't wait more than five minutes to call [address]! Fast action can save your life. Calling 911 is almost always the fastest way to get lifesaving treatment. Emergency Medical Services staff can begin treatment when they arrive  up to an hour sooner than if someone gets to the hospital by car. The discharge information has been reviewed with the patient and spouse. The patient and spouse verbalized understanding. Discharge medications reviewed with the patient and spouse and appropriate educational materials and side effects teaching were provided. ___________________________________________________________________________________________________________________________________ Introducing [facility]! [employer] introduces "Hey, Neighbor!" patient portal. Now you can access parts of your medical record, email your doctor's office, and request medication refills online. 1. In your internet browser, go to [email or web address] 2. Click on the First Time User? Click Here link in the Sign In box. You will see the New Member Sign Up page. 3. Enter your "Hey, Neighbor!" Access Code exactly as it appears below. You will not need to use this code after youve completed the sign-up process. If you do not sign up before the expiration date, you must request a new code. · "Hey, Neighbor!" Access Code: [email or web address] Expires: 6/26/2018 11:34 AM 
 
4. Enter the last four digits of your Social Security Number (xxxx) and Date of Birth (mm/dd/yyyy) as indicated and click Submit. You will be taken to the next sign-up page. 5. Create a "Hey, Neighbor!" ID. This will be your "Hey, Neighbor!" login ID and cannot be changed, so think of one that is secure and easy to remember. 6. Create a "Hey, Neighbor!" password. You can change your password at any time. 7. Enter your Password Reset Question and Answer. This can be used at a later time if you forget your password. 8. Enter your e-mail address. You will receive e-mail notification when new information is available in [address] 9. Click Sign Up. You can now view and download portions of your medical record. 10. Click the Download Summary menu link to download a portable copy of your medical information. If you have questions, please visit the Frequently Asked Questions section of the "Hey, Neighbor!" website. Remember, "Hey, Neighbor!" is NOT to be used for urgent needs. For medical emergencies, dial 911. Now available from your iPhone and Android! Introducing Rafael Melo As a [facility] patient, I wanted to make you aware of our electronic visit tool called Rafael Melo. [employer] allows you to connect within minutes with a medical provider 24 hours a day, seven days a week via a mobile device or tablet or logging into a secure website from your computer. You can access Rafael Melo from anywhere in the [place]. A virtual visit might be right for you when you have a simple condition and feel like you just dont want to get out of bed, or cant get away from work for an appointment, when your regular [facility] provider is not available (evenings, weekends or holidays), or when youre out of town and need minor care. Electronic visits cost only $49 and if the [employer] provider determines a prescription is needed to treat your condition, one can be electronically transmitted to a nearby pharmacy*. Please take a moment to enroll today if you have not already done so. The enrollment process is free and takes just a few minutes. To enroll, please download the [employer] marquita to your tablet or phone, or visit [email or web address] to enroll on your computer. And, as an [address] [place] [address] patient with a [employer] account, the results of your visits will be scanned into your electronic medical record and your primary care provider will be able to view the scanned results. We urge you to continue to see your regular [facility] provider for your ongoing medical care. And while your primary care provider may not be the one available when you seek a Rafael Melo virtual visit, the peace of mind you get from getting a real diagnosis real time can be priceless. For more information on Rafael Melo, view our Frequently Asked Questions (FAQs) at [email or web address]. Sincerely, 
 
Dianah Sicard, MD 
Chief Medical Officer Mike Mina *:  certain medications cannot be prescribed via Rafael Melo Unresulted Labs-Please follow up with your PCP about these lab tests Order Current Status CULTURE, URINE In process Providers Seen During Your Hospitalization Provider Specialty Primary office phone Caryl Zapien MD Gastroenterology [phone] Your Primary Care Physician (PCP) Primary Care Physician Office Phone Office Fax Oral [place]raise [phone] You are allergic to the following Allergen Reactions Latex Unknown (comments) \"skin irritation\" Adhesive Unknown (comments) Penicillins Other (comments) Intolerance documented in [facility], pt denies. Recent Documentation Height Weight BMI Smoking Status 1.803 m 88.5 kg 27.2 kg/m2 Former Smoker Emergency Contacts Name Discharge Info Relation Home Work Mobile Taryn Brennan DISCHARGE CAREGIVER [3] Spouse [3] [phone] Patient Belongings The following personal items are in your possession at time of discharge: 
  Dental Appliances: None  Visual Aid: None Please provide this summary of care documentation to your next provider. Signatures-by signing, you are acknowledging that this After Visit Summary has been reviewed with you and you have received a copy. Patient Signature:  ____________________________________________________________ Date:  ____________________________________________________________  
  
Beverly Moore Provider Signature:  ____________________________________________________________ Date:  ____________________________________________________________

## 2018-03-29 NOTE — PERIOP NOTES
Urinalysis came back normal, Ok to D/C patient home per Dr. Perez Enrique, she will have Mr. Janina Rajan follow-up with urology on a outpatient basis

## 2018-03-29 NOTE — ANESTHESIA POSTPROCEDURE EVALUATION
Post-Anesthesia Evaluation and Assessment    Patient: Delilah Bardales MRN: [id number]  SSN: [id number]    YOB: 1935  Age: 82 y.o. Sex: male       Cardiovascular Function/Vital Signs  Visit Vitals    /60    Pulse 70    Temp 36.3 °C (97.4 °F)    Resp 16    Ht 5' 11\" (1.803 m)    Wt 88.5 kg (195 lb)    SpO2 100%    BMI 27.2 kg/m2       Patient is status post MAC anesthesia for Procedure(s):  COLONOSCOPY with polypectomies, polyp bx and clip x4. Nausea/Vomiting: None    Postoperative hydration reviewed and adequate. Pain:  Pain Scale 1: Numeric (0 - 10) (03/29/18 1019)  Pain Intensity 1: 0 (03/29/18 1019)   Managed    Neurological Status: At baseline    Mental Status and Level of Consciousness: Alert and oriented     Pulmonary Status:   O2 Device: Room air (03/29/18 1142)   Adequate oxygenation and airway patent    Complications related to anesthesia: None    Post-anesthesia assessment completed.  No concerns    Signed By: Aniyah Gonzalez MD     March 29, 2018

## 2018-03-29 NOTE — PERIOP NOTES
Upon transferring patient to phase II, patient complains of burning to penis and pelvic area. Dr Charan Orr notified.

## 2018-03-29 NOTE — H&P
[email or web address]  [phone]      History and Physical    Patient: Caryn Younger MRN: [id number]  SSN: [id number]    YOB: 1935  Age: 82 y.o. Sex: male      Subjective:      Caryn Younger is a 82 y.o. male who presents with anemia and heme positive stools. Past Medical History:   Diagnosis Date    Cancer [facility])     left kidney (patient states over 20 years ago)    Cataract 2007    Frequency     H/O kidney removal 1982    H/O skin graft 2007    Heart attack 1980,2006    Heart attack     Heart valve replaced 2008    Kidney stones     Nocturia     Pacemaker 2008    UTI (urinary tract infection)      Past Surgical History:   Procedure Laterality Date    HX CATARACT REMOVAL  2011    HX HEART VALVE SURGERY  2008    HX NEPHRECTOMY Left     HX PACEMAKER  2008    SKIN GRAFT, HARVEST CULTURED TISSUE  2007      History reviewed. No pertinent family history. Social History   Substance Use Topics    Smoking status: Former Smoker    Smokeless tobacco: Never Used      Comment: quit in 1975    Alcohol use No      Prior to Admission medications    Medication Sig Start Date End Date Taking? Authorizing Provider   omeprazole (PRILOSEC) 20 mg capsule  12/17/17  Yes Historical Provider   tamsulosin (FLOMAX) 0.4 mg capsule Take 1 Cap by mouth daily (after dinner). Indications: benign prostatic hyperplasia with lower urinary tract sx 1/11/18  Yes En Thompson MD   allopurinol (ZYLOPRIM) 300 mg tablet daily (after dinner). 7/9/16  Yes Historical Provider   atorvastatin (LIPITOR) 40 mg tablet daily (after dinner). 8/5/16  Yes Historical Provider   captopril (CAPOTEN) 12.5 mg tablet two (2) times a day. 1/2 tab BID 9/5/16  Yes Historical Provider   carvedilol (COREG) 3.125 mg tablet two (2) times daily (with meals).  9/5/16  Yes Historical Provider   sertraline (ZOLOFT) 50 mg tablet take 1 tablet by mouth once daily 9/1/16  Yes Historical Provider   warfarin (COUMADIN) 2 mg tablet 2 mg. 2mg every day except Tuesday and Thursday  3mg Tuesday and Thursday 8/5/16  Yes Historical Provider   cholecalciferol (VITAMIN D3) 1,000 unit cap Take  by mouth daily. Yes Historical Provider   nitrofurantoin, macrocrystal-monohydrate, (MACROBID) 100 mg capsule Take 1 Cap by mouth two (2) times a day. 9/22/16  Yes Bonnie Madrid MD   ezetimibe (ZETIA) 10 mg tablet Take  by mouth. Yes Historical Provider   OMEGA-3 ACID ETHYL ESTERS (LOVAZA PO) Take  by mouth. 2 tabs in am and 1 tab at night   Yes Historical Provider   aspirin 81 mg tablet Take 81 mg by mouth. Yes Historical Provider   NIACIN by Does Not Apply route nightly. Yes Historical Provider   fluticasone (FLONASE) 50 mcg/actuation nasal spray instill 1 spray into each nostril twice a day 7/19/16   Historical Provider   ACID REDUCER, FAMOTIDINE, PO Take  by mouth. Historical Provider   FERROUS SULFATE PO Take  by mouth. Historical Provider        Allergies   Allergen Reactions    Latex Unknown (comments)     \"skin irritation\"    Adhesive Unknown (comments)    Penicillins Other (comments)     Intolerance documented in [facility], pt denies. Review of Systems:  A comprehensive review of systems was negative except for that written in the History of Present Illness. Objective:     Vitals:    03/27/18 0928   Weight: 81.6 kg (180 lb)   Height: 5' 11\" (1.803 m)        Physical Exam:  GENERAL: alert, cooperative, no distress, appears stated age  LUNG: clear to auscultation bilaterally  HEART: regular rate and rhythm, S1, S2 normal, no murmur, click, rub or gallop  ABDOMEN: soft, non-tender. Bowel sounds normal. No masses,  no organomegaly  NEUROLOGIC: alert & oriented x 3    Assessment:     1. Anemia  2. Heme positive stool    Plan:     1.  Colonoscopy    Signed By: Alexis Lazo MD     March 29, 2018      Alexis Lazo MD  [facility] [address]  Cell - [phone]  [email or web address]

## 2018-03-30 LAB
BACTERIA SPEC CULT: ABNORMAL
SERVICE CMNT-IMP: ABNORMAL

## 2018-04-07 ENCOUNTER — HOSPITAL ENCOUNTER (OUTPATIENT)
Age: 83
Setting detail: OBSERVATION
Discharge: HOME HEALTH | DRG: 920 | End: 2018-04-09
Attending: INTERNAL MEDICINE | Admitting: INTERNAL MEDICINE
Payer: MEDICARE

## 2018-04-07 PROBLEM — K92.2 LOWER GI BLEED: Status: ACTIVE | Noted: 2018-04-07

## 2018-04-07 PROBLEM — K62.5 BRBPR (BRIGHT RED BLOOD PER RECTUM): Status: ACTIVE | Noted: 2018-04-07

## 2018-04-07 LAB
ABO + RH BLD: NORMAL
ANION GAP SERPL CALC-SCNC: 9 MMOL/L (ref 3–18)
BASOPHILS # BLD: 0 K/UL (ref 0–0.1)
BASOPHILS NFR BLD: 0 % (ref 0–2)
BLOOD GROUP ANTIBODIES SERPL: NORMAL
BUN SERPL-MCNC: 20 MG/DL (ref 7–18)
BUN/CREAT SERPL: 19 (ref 12–20)
CALCIUM SERPL-MCNC: 8.9 MG/DL (ref 8.5–10.1)
CHLORIDE SERPL-SCNC: 110 MMOL/L (ref 100–108)
CO2 SERPL-SCNC: 22 MMOL/L (ref 21–32)
CREAT SERPL-MCNC: 1.05 MG/DL (ref 0.6–1.3)
DIFFERENTIAL METHOD BLD: ABNORMAL
EOSINOPHIL # BLD: 0.1 K/UL (ref 0–0.4)
EOSINOPHIL NFR BLD: 2 % (ref 0–5)
ERYTHROCYTE [DISTWIDTH] IN BLOOD BY AUTOMATED COUNT: 13.9 % (ref 11.6–14.5)
GLUCOSE BLD STRIP.AUTO-MCNC: 112 MG/DL (ref 70–110)
GLUCOSE SERPL-MCNC: 100 MG/DL (ref 74–99)
HCT VFR BLD AUTO: 27 % (ref 36–48)
HGB BLD-MCNC: 9 G/DL (ref 13–16)
INR PPP: 1.2 (ref 0.8–1.2)
LYMPHOCYTES # BLD: 2 K/UL (ref 0.9–3.6)
LYMPHOCYTES NFR BLD: 38 % (ref 21–52)
MAGNESIUM SERPL-MCNC: 1.7 MG/DL (ref 1.6–2.6)
MCH RBC QN AUTO: 29.5 PG (ref 24–34)
MCHC RBC AUTO-ENTMCNC: 33.3 G/DL (ref 31–37)
MCV RBC AUTO: 88.5 FL (ref 74–97)
MONOCYTES # BLD: 0.4 K/UL (ref 0.05–1.2)
MONOCYTES NFR BLD: 7 % (ref 3–10)
NEUTS SEG # BLD: 2.8 K/UL (ref 1.8–8)
NEUTS SEG NFR BLD: 53 % (ref 40–73)
PHOSPHATE SERPL-MCNC: 3 MG/DL (ref 2.5–4.9)
PLATELET # BLD AUTO: 166 K/UL (ref 135–420)
PMV BLD AUTO: 10.4 FL (ref 9.2–11.8)
POTASSIUM SERPL-SCNC: 4.1 MMOL/L (ref 3.5–5.5)
PROTHROMBIN TIME: 14.7 SEC (ref 11.5–15.2)
RBC # BLD AUTO: 3.05 M/UL (ref 4.7–5.5)
SODIUM SERPL-SCNC: 141 MMOL/L (ref 136–145)
SPECIMEN EXP DATE BLD: NORMAL
WBC # BLD AUTO: 5.2 K/UL (ref 4.6–13.2)

## 2018-04-07 PROCEDURE — 85610 PROTHROMBIN TIME: CPT | Performed by: INTERNAL MEDICINE

## 2018-04-07 PROCEDURE — 86901 BLOOD TYPING SEROLOGIC RH(D): CPT | Performed by: INTERNAL MEDICINE

## 2018-04-07 PROCEDURE — 85025 COMPLETE CBC W/AUTO DIFF WBC: CPT | Performed by: INTERNAL MEDICINE

## 2018-04-07 PROCEDURE — 77030011256 HC DRSG MEPILEX <16IN NO BORD MOLN -A

## 2018-04-07 PROCEDURE — 65660000004 HC RM CVT STEPDOWN

## 2018-04-07 PROCEDURE — 74011258636 HC RX REV CODE- 258/636: Performed by: INTERNAL MEDICINE

## 2018-04-07 PROCEDURE — 84100 ASSAY OF PHOSPHORUS: CPT | Performed by: INTERNAL MEDICINE

## 2018-04-07 PROCEDURE — 82962 GLUCOSE BLOOD TEST: CPT

## 2018-04-07 PROCEDURE — 80048 BASIC METABOLIC PNL TOTAL CA: CPT | Performed by: INTERNAL MEDICINE

## 2018-04-07 PROCEDURE — 65610000006 HC RM INTENSIVE CARE

## 2018-04-07 PROCEDURE — 36415 COLL VENOUS BLD VENIPUNCTURE: CPT | Performed by: INTERNAL MEDICINE

## 2018-04-07 PROCEDURE — 83735 ASSAY OF MAGNESIUM: CPT | Performed by: INTERNAL MEDICINE

## 2018-04-07 PROCEDURE — 96360 HYDRATION IV INFUSION INIT: CPT

## 2018-04-07 PROCEDURE — 74011250637 HC RX REV CODE- 250/637: Performed by: INTERNAL MEDICINE

## 2018-04-07 PROCEDURE — 99218 HC RM OBSERVATION: CPT

## 2018-04-07 PROCEDURE — 96361 HYDRATE IV INFUSION ADD-ON: CPT

## 2018-04-07 RX ORDER — TAMSULOSIN HYDROCHLORIDE 0.4 MG/1
0.4 CAPSULE ORAL
Status: DISCONTINUED | OUTPATIENT
Start: 2018-04-08 | End: 2018-04-09 | Stop reason: HOSPADM

## 2018-04-07 RX ORDER — ACETAMINOPHEN 325 MG/1
650 TABLET ORAL
Status: DISCONTINUED | OUTPATIENT
Start: 2018-04-07 | End: 2018-04-09 | Stop reason: HOSPADM

## 2018-04-07 RX ORDER — MULTIVITAMIN
1 TABLET ORAL DAILY
COMMUNITY

## 2018-04-07 RX ORDER — ATORVASTATIN CALCIUM 40 MG/1
40 TABLET, FILM COATED ORAL
Status: DISCONTINUED | OUTPATIENT
Start: 2018-04-08 | End: 2018-04-09 | Stop reason: HOSPADM

## 2018-04-07 RX ORDER — GUAIFENESIN 100 MG/5ML
81 LIQUID (ML) ORAL DAILY
Status: DISCONTINUED | OUTPATIENT
Start: 2018-04-08 | End: 2018-04-09 | Stop reason: HOSPADM

## 2018-04-07 RX ORDER — SODIUM CHLORIDE 0.9 % (FLUSH) 0.9 %
5-10 SYRINGE (ML) INJECTION EVERY 8 HOURS
Status: DISCONTINUED | OUTPATIENT
Start: 2018-04-07 | End: 2018-04-09 | Stop reason: HOSPADM

## 2018-04-07 RX ORDER — CARVEDILOL 3.12 MG/1
3.12 TABLET ORAL 2 TIMES DAILY WITH MEALS
Status: DISCONTINUED | OUTPATIENT
Start: 2018-04-08 | End: 2018-04-09 | Stop reason: HOSPADM

## 2018-04-07 RX ORDER — DEXTROSE, SODIUM CHLORIDE, SODIUM LACTATE, POTASSIUM CHLORIDE, AND CALCIUM CHLORIDE 5; .6; .31; .03; .02 G/100ML; G/100ML; G/100ML; G/100ML; G/100ML
60 INJECTION, SOLUTION INTRAVENOUS CONTINUOUS
Status: DISCONTINUED | OUTPATIENT
Start: 2018-04-07 | End: 2018-04-09

## 2018-04-07 RX ORDER — SERTRALINE HYDROCHLORIDE 50 MG/1
50 TABLET, FILM COATED ORAL DAILY
Status: DISCONTINUED | OUTPATIENT
Start: 2018-04-08 | End: 2018-04-09 | Stop reason: HOSPADM

## 2018-04-07 RX ORDER — ALLOPURINOL 300 MG/1
300 TABLET ORAL DAILY
Status: DISCONTINUED | OUTPATIENT
Start: 2018-04-08 | End: 2018-04-09 | Stop reason: HOSPADM

## 2018-04-07 RX ORDER — PANTOPRAZOLE SODIUM 40 MG/1
40 TABLET, DELAYED RELEASE ORAL
Status: DISCONTINUED | OUTPATIENT
Start: 2018-04-08 | End: 2018-04-09 | Stop reason: HOSPADM

## 2018-04-07 RX ORDER — CAPTOPRIL 12.5 MG/1
6.25 TABLET ORAL 2 TIMES DAILY
Status: DISCONTINUED | OUTPATIENT
Start: 2018-04-07 | End: 2018-04-09 | Stop reason: HOSPADM

## 2018-04-07 RX ORDER — EZETIMIBE 10 MG/1
10 TABLET ORAL DAILY
Status: DISCONTINUED | OUTPATIENT
Start: 2018-04-08 | End: 2018-04-09 | Stop reason: HOSPADM

## 2018-04-07 RX ORDER — SODIUM CHLORIDE 0.9 % (FLUSH) 0.9 %
5-10 SYRINGE (ML) INJECTION AS NEEDED
Status: DISCONTINUED | OUTPATIENT
Start: 2018-04-07 | End: 2018-04-09 | Stop reason: HOSPADM

## 2018-04-07 RX ADMIN — CAPTOPRIL 6.25 MG: 12.5 TABLET ORAL at 22:52

## 2018-04-07 RX ADMIN — Medication 10 ML: at 22:00

## 2018-04-07 RX ADMIN — SODIUM CHLORIDE, SODIUM LACTATE, POTASSIUM CHLORIDE, CALCIUM CHLORIDE AND DEXTROSE MONOHYDRATE 100 ML/HR: 5; 600; 310; 30; 20 INJECTION, SOLUTION INTRAVENOUS at 19:21

## 2018-04-07 NOTE — IP AVS SNAPSHOT
[address] [id number] [phone] Patient: Bruno Leon MRN: [id number] UNL:2/75/[id number] A check stacia indicates which time of day the medication should be taken. My Medications START taking these medications Instructions Each Dose to Equal  
 Morning Noon Evening Bedtime OTHER Your last dose was: Your next dose is:    
   
   
 Check PT, INR on 4/11/18, results to cardiologist Dr Fredo Hansen immediately OTHER Your last dose was: Your next dose is:    
   
   
 Check CBC, CMP, Mg in 3 days, results to PCP immediately, Diagnosis - GI bleed CHANGE how you take these medications Instructions Each Dose to Equal  
 Morning Noon Evening Bedtime  
 warfarin 2 mg tablet Commonly known as:  COUMADIN What changed:   
- how much to take 
- additional instructions Your last dose was: Your next dose is:    
   
   
 2mg every day except Tuesday and Thursday. 3mg Tuesday and Thursday. RESUME FROM 4/10/18 if no bleeding noted  Indications: Cerebral Thromboembolism Prevention CONTINUE taking these medications Instructions Each Dose to Equal  
 Morning Noon Evening Bedtime  
 allopurinol 300 mg tablet Commonly known as:  Esperanza Ordonez Your last dose was: Your next dose is:    
   
   
 daily (after dinner). aspirin 81 mg tablet Your last dose was: Your next dose is: Take 81 mg by mouth. 81 mg  
    
   
   
   
  
 atorvastatin 40 mg tablet Commonly known as:  LIPITOR Your last dose was: Your next dose is:    
   
   
 daily (after dinner). calcium-cholecalciferol (D3) tablet Commonly known as:  CALTRATE 600+D Your last dose was: Your next dose is: Take 1 Tab by mouth daily. 1 Tab  
    
   
   
   
  
 captopril 12.5 mg tablet Commonly known as:  CAPOTEN Your last dose was: Your next dose is:    
   
   
 two (2) times a day. 1/2 tab BID  
     
   
   
   
  
 carvedilol 3.125 mg tablet Commonly known as:  Maryan Garrido Your last dose was: Your next dose is:    
   
   
 two (2) times daily (with meals). cholecalciferol 1,000 unit Cap Commonly known as:  VITAMIN D3 Your last dose was: Your next dose is: Take  by mouth daily. FERROUS SULFATE PO Your last dose was: Your next dose is: Take  by mouth. fluticasone 50 mcg/actuation nasal spray Commonly known as:  Jeramie Calk Your last dose was: Your next dose is:    
   
   
 instill 1 spray into each nostril twice a day  
     
   
   
   
  
 levoFLOXacin 500 mg tablet Commonly known as:  Murphy Cherokee Your last dose was: Your next dose is: Take 1 Tab by mouth daily. 500 mg  
    
   
   
   
  
 LOVAZA PO Your last dose was: Your next dose is: Take  by mouth. 2 tabs in am and 1 tab at night  
     
   
   
   
  
 omeprazole 20 mg capsule Commonly known as:  PRILOSEC Your last dose was: Your next dose is:    
   
   
      
   
   
   
  
 sertraline 50 mg tablet Commonly known as:  ZOLOFT Your last dose was: Your next dose is:    
   
   
 take 1 tablet by mouth once daily  
     
   
   
   
  
 tamsulosin 0.4 mg capsule Commonly known as:  FLOMAX Your last dose was: Your next dose is: Take 1 Cap by mouth daily (after dinner). Indications: benign prostatic hyperplasia with lower urinary tract sx  
 0.4 mg  
    
   
   
   
  
 ZETIA 10 mg tablet Generic drug:  ezetimibe Your last dose was: Your next dose is: Take  by mouth. STOP taking these medications   
 acetaminophen 325 mg tablet Commonly known as:  TYLENOL  
   
  
  
ASK your doctor about these medications Instructions Each Dose to Equal  
 Morning Noon Evening Bedtime NIACIN Your last dose was: Your next dose is:    
   
   
 by Does Not Apply route nightly. Where to Get Your Medications Information on where to get these meds will be given to you by the nurse or doctor. ! Ask your nurse or doctor about these medications OTHER  
 OTHER  
 warfarin 2 mg tablet

## 2018-04-07 NOTE — ROUTINE PROCESS
TRANSFER - IN REPORT:    Verbal report received from [address](name) on Cathy Neighbours  being received from [address] [place] [address](unit) for urgent transfer      Report consisted of patients Situation, Background, Assessment and   Recommendations(SBAR). Information from the following report(s) SBAR, Kardex, ED Summary, Intake/Output, MAR, Recent Results and Cardiac Rhythm . was reviewed with the receiving nurse. Opportunity for questions and clarification was provided. Assessment completed upon patients arrival to unit and care assumed.

## 2018-04-07 NOTE — H&P
Hospitalist Admission Note    NAME: [place] Mel[place]   :  1935   MRN:  [id number]     Date/Time of admission:  2018 6:00 PM    Patient PCP: Natividad Beck MD  ________________________________________________________________________    My assessment of this patient's clinical condition and my plan of care is as follows. Assessment / Plan:  1. Bright red blood per rectum (BRBPR) in setting of anticoagulation  2. H/o atrial fibrillation  3. Acute on chronic anemia from gi bleed  4. H/o SSS s/p PPM  5. Recent melena s/p colonoscopy with evidence of colonic polyps  6. 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 H/o CAD, native vessel  8. Benign HTN  9. Mixed hyperlipidemia  10. Chronic systolic CHF - EF 25-92%    1. Transfer to SDU as pt is hemodynamically stable and does not require ICU  2. GI on board and will evaluate in am  3. Hold all anticoagulation and keep npo after midnight  4. Serial H/H's and monitor on telemetry  5. Discussed case with Dr. Tommy Hameed who is Dr. Alissa Beebe partner, per patient's request and updated pt that Dr. Tommy Hameed agreed with the plan. 6. Cardiology consulted to assist in timing of restarting anticoagulation, if pt is still a candidate for such. Code Status: full  Surrogate Decision Maker: patient    DVT Prophylaxis: SCD's  GI Prophylaxis: not indicated          Subjective:   CHIEF COMPLAINT: BRBPR    HISTORY OF PRESENT ILLNESS:     [place] Melchor is a 82 y.o.  male who presents with BRBPR. Pt has the pmhx of MVR with a porcine valve, SSS s/p PPM, h/o AFib, and has had a c/o dark stools since January. He was referred to GI and had a colonoscopy on 3/29. Prior to that procedure, pt appropriately held his coumadin and was bridged. Colonoscopy report showed 9 polyps and multiple were resected. Pt restarted his lovenox/coumadin on  after he felt that he was not bleeding anymore. Today be was in the [facility] [employer] and began to have BRBPR.  He presented to their ED and his hgb was 8 down from 10 three days ago. He was given 2 units of prbc's and tolerated them well. ICU was called here at [facility] and they accepted the patient with GI on board as well. Upon pt's arrival, it was noted that medicine had not been contacted and therefore ICU called me to get involved. Once to pt's bedside, pt was upset that he was not going to be on blood thinners right now. The reasoning was explained to both him and his wife. He refused further communication with me because he stated he had already told another doctor. However, he did ask me to run everything by his outpt cardiologist, Dr. Fredo Hansen. I called Dr. Dawit Middleton who is currently on call for Dr. Fredo Hansen, and he agreed with the plan. Pt is hemodynamically stable and does not require ICU and will be transferred to SDU. We were asked to admit for work up and evaluation of the above problems. Past Medical History:   Diagnosis Date    Cancer [facility])     left kidney (patient states over 20 years ago)    Cataract 2007    Frequency     H/O kidney removal 1982    H/O skin graft 2007    Heart attack [facility]) [id number]    Heart attack ([address]     Heart valve replaced 2008    Kidney stones     Nocturia     Pacemaker 2008    UTI (urinary tract infection)         Past Surgical History:   Procedure Laterality Date    COLONOSCOPY N/A 3/29/2018    COLONOSCOPY with polypectomies, polyp bx and clip x4 performed by Quin Edouard MD at [address] [place] [address]  2011    HX HEART VALVE SURGERY  2008    HX NEPHRECTOMY Left     HX PACEMAKER  2008    SKIN GRAFT, HARVEST CULTURED TISSUE  2007       Social History   Substance Use Topics    Smoking status: Former Smoker    Smokeless tobacco: Never Used      Comment: quit in [place] Alcohol use No        No family history on file.   Allergies   Allergen Reactions    Latex Unknown (comments)     \"skin irritation\"    Adhesive Unknown (comments)    Penicillins Other (comments)     Intolerance documented in [facility], pt denies. Prior to Admission medications    Medication Sig Start Date End Date Taking? Authorizing Provider   acetaminophen (TYLENOL) 325 mg tablet 325 mg. Historical Provider   enoxaparin (LOVENOX) 80 mg/0.8 mL injection 80 mg. 3/16/18   Historical Provider   traMADol (ULTRAM) 50 mg tablet 50 mg. 3/20/17   Historical Provider   levoFLOXacin (LEVAQUIN) 500 mg tablet Take 1 Tab by mouth daily. 4/3/18   Judy Lainez MD   omeprazole (PRILOSEC) 20 mg capsule  12/17/17   Historical Provider   tamsulosin (FLOMAX) 0.4 mg capsule Take 1 Cap by mouth daily (after dinner). Indications: benign prostatic hyperplasia with lower urinary tract sx 1/11/18   Leon Thomas MD   allopurinol (ZYLOPRIM) 300 mg tablet daily (after dinner). 7/9/16   Historical Provider   atorvastatin (LIPITOR) 40 mg tablet daily (after dinner). 8/5/16   Historical Provider   captopril (CAPOTEN) 12.5 mg tablet two (2) times a day. 1/2 tab BID 9/5/16   Historical Provider   carvedilol (COREG) 3.125 mg tablet two (2) times daily (with meals). 9/5/16   Historical Provider   fluticasone (FLONASE) 50 mcg/actuation nasal spray instill 1 spray into each nostril twice a day 7/19/16   Historical Provider   sertraline (ZOLOFT) 50 mg tablet take 1 tablet by mouth once daily 9/1/16   Historical Provider   warfarin (COUMADIN) 2 mg tablet 2 mg. 2mg every day except Tuesday and Thursday  3mg Tuesday and Thursday 8/5/16   Historical Provider   cholecalciferol (VITAMIN D3) 1,000 unit cap Take  by mouth daily. Historical Provider   ACID REDUCER, FAMOTIDINE, PO Take  by mouth. Historical Provider   nitrofurantoin, macrocrystal-monohydrate, (MACROBID) 100 mg capsule Take 1 Cap by mouth two (2) times a day. 9/22/16   Leon Thomas MD   ezetimibe (ZETIA) 10 mg tablet Take  by mouth. Historical Provider   OMEGA-3 ACID ETHYL ESTERS (LOVAZA PO) Take  by mouth.  2 tabs in am and 1 tab at night    Historical Provider FERROUS SULFATE PO Take  by mouth. Historical Provider   aspirin 81 mg tablet Take 81 mg by mouth. Historical Provider   NIACIN by Does Not Apply route nightly. Historical Provider       REVIEW OF SYSTEMS:     I am not able to complete the review of systems because: The patient is intubated and sedated    The patient has altered mental status due to his acute medical problems    The patient has baseline aphasia from prior stroke(s)    The patient has baseline dementia and is not reliable historian    The patient is in acute medical distress and unable to provide information           Total of 12 systems reviewed as follows:       POSITIVE= bolded text  Negative = text not underlined  General:  fever, chills, sweats, generalized weakness, weight loss/gain,      loss of appetite   Eyes:    blurred vision, eye pain, loss of vision, double vision  ENT:    rhinorrhea, pharyngitis   Respiratory:   cough, sputum production, SOB, BEARDEN, wheezing, pleuritic pain   Cardiology:   chest pain, palpitations, orthopnea, PND, edema, syncope   Gastrointestinal:  abdominal pain , N/V, diarrhea, dysphagia, constipation, bleeding   Genitourinary:  frequency, urgency, dysuria, hematuria, incontinence   Muskuloskeletal :  arthralgia, myalgia, back pain  Hematology:  easy bruising, nose or gum bleeding, lymphadenopathy   Dermatological: rash, ulceration, pruritis, color change / jaundice  Endocrine:   hot flashes or polydipsia   Neurological:  headache, dizziness, confusion, focal weakness, paresthesia,     Speech difficulties, memory loss, gait difficulty  Psychological: Feelings of anxiety, depression, agitation    Objective:   VITALS:    There were no vitals taken for this visit. PHYSICAL EXAM:    General:    Alert, cooperative, no distress, appears stated age.      HEENT: Atraumatic, anicteric sclerae, pink conjunctivae     No oral ulcers, mucosa moist, throat clear, dentition fair  Neck:  Supple, symmetrical, thyroid: non tender  Lungs:   Clear to auscultation bilaterally. No Wheezing or Rhonchi. No rales. Chest wall:  No tenderness  No Accessory muscle use. Heart:   Regular  rhythm,  No  murmur   No edema  Abdomen:   Soft, non-tender. Not distended. Bowel sounds normal  Extremities: No cyanosis. No clubbing,      Skin turgor normal, Capillary refill normal, Radial dial pulse 2+  Skin:     Not pale. Not Jaundiced  No rashes   Psych:  Good insight. Not depressed. Not anxious or agitated. Neurologic: EOMs intact. No facial asymmetry. No aphasia or slurred speech. Symmetrical strength, Sensation grossly intact. Alert and oriented X 4.     _______________________________________________________________________  Care Plan discussed with:    Comments   Patient x    Family  x    RN x    Care Manager                    Consultant:  berna PCCM, Cardiology   _______________________________________________________________________  Expected  Disposition:   Home with Family x   HH/PT/OT/RN    SNF/LTC    TOM    ________________________________________________________________________  TOTAL TIME:  72 Minutes    Critical Care Provided     Minutes non procedure based      Comments    x Reviewed previous records   >50% of visit spent in counseling and coordination of care x Discussion with patient and/or family and questions answered       ________________________________________________________________________      Procedures: see electronic medical records for all procedures/Xrays and details which were not copied into this note but were reviewed prior to creation of Plan.     LAB DATA REVIEWED:    Recent Results (from the past 24 hour(s))   GLUCOSE, POC    Collection Time: 04/07/18  5:32 PM   Result Value Ref Range    Glucose (POC) 112 (H) 70 - 110 mg/dL       Masood Barragan MD  Internal Medicine  Hospitalist Division

## 2018-04-07 NOTE — IP AVS SNAPSHOT
[address] [address] 
[phone] Patient: Caryn Younger MRN: [id number] QQA:[id number] About your hospitalization You were admitted on:  April 7, 2018 You last received care in the:  [facility] 2 CV STEPDOWN You were discharged on:  April 9, 2018 Why you were hospitalized Your primary diagnosis was:  Not on File Your diagnoses also included:  Brbpr (Bright Red Blood Per Rectum), Lower Gi Bleed, Acute Blood Loss Anemia, S/P Mvr (Mitral Valve Replacement), Colon Polyps, S/P Cabg (Coronary Artery Bypass Graft), Pacemaker, Atrial Fibrillation (Hcc) Follow-up Information Follow up With Details Comments Contact Info  
   family has already set there appt with  office and  office and there labs appt/time done office closed as of now Your Scheduled Appointments Tuesday May 15, 2018  1:00 PM EDT Any with Filiberto Tovar MD  
[facility] [address]  
[phone] Discharge Orders None A check stacia indicates which time of day the medication should be taken. My Medications START taking these medications Instructions Each Dose to Equal  
 Morning Noon Evening Bedtime OTHER Your last dose was: Your next dose is:    
   
   
 Check PT, INR on 4/11/18, results to cardiologist Dr Olinda Lancaster immediately OTHER Your last dose was: Your next dose is:    
   
   
 Check CBC, CMP, Mg in 3 days, results to PCP immediately, Diagnosis - GI bleed CHANGE how you take these medications Instructions Each Dose to Equal  
 Morning Noon Evening Bedtime  
 warfarin 2 mg tablet Commonly known as:  COUMADIN What changed:   
- how much to take 
- additional instructions Your last dose was: Your next dose is:    
   
   
 2mg every day except Tuesday and Thursday. 3mg Tuesday and Thursday. RESUME FROM 4/10/18 if no bleeding noted  Indications: Cerebral Thromboembolism Prevention CONTINUE taking these medications Instructions Each Dose to Equal  
 Morning Noon Evening Bedtime  
 allopurinol 300 mg tablet Commonly known as:  Alisia Roblero Your last dose was: Your next dose is:    
   
   
 daily (after dinner). aspirin 81 mg tablet Your last dose was: Your next dose is: Take 81 mg by mouth. 81 mg  
    
   
   
   
  
 atorvastatin 40 mg tablet Commonly known as:  LIPITOR Your last dose was: Your next dose is:    
   
   
 daily (after dinner). calcium-cholecalciferol (D3) tablet Commonly known as:  CALTRATE 600+D Your last dose was: Your next dose is: Take 1 Tab by mouth daily. 1 Tab  
    
   
   
   
  
 captopril 12.5 mg tablet Commonly known as:  CAPOTEN Your last dose was: Your next dose is:    
   
   
 two (2) times a day. 1/2 tab BID  
     
   
   
   
  
 carvedilol 3.125 mg tablet Commonly known as:  Saray Mccloud Your last dose was: Your next dose is:    
   
   
 two (2) times daily (with meals). cholecalciferol 1,000 unit Cap Commonly known as:  VITAMIN D3 Your last dose was: Your next dose is: Take  by mouth daily. FERROUS SULFATE PO Your last dose was: Your next dose is: Take  by mouth. fluticasone 50 mcg/actuation nasal spray Commonly known as:  Dean Dos Santos Your last dose was: Your next dose is:    
   
   
 instill 1 spray into each nostril twice a day  
     
   
   
   
  
 levoFLOXacin 500 mg tablet Commonly known as:  Araceli Anderson  
   
 Your last dose was: Your next dose is: Take 1 Tab by mouth daily. 500 mg  
    
   
   
   
  
 LOVAZA PO Your last dose was: Your next dose is: Take  by mouth. 2 tabs in am and 1 tab at night  
     
   
   
   
  
 omeprazole 20 mg capsule Commonly known as:  PRILOSEC Your last dose was: Your next dose is:    
   
   
      
   
   
   
  
 sertraline 50 mg tablet Commonly known as:  ZOLOFT Your last dose was: Your next dose is:    
   
   
 take 1 tablet by mouth once daily  
     
   
   
   
  
 tamsulosin 0.4 mg capsule Commonly known as:  FLOMAX Your last dose was: Your next dose is: Take 1 Cap by mouth daily (after dinner). Indications: benign prostatic hyperplasia with lower urinary tract sx  
 0.4 mg  
    
   
   
   
  
 ZETIA 10 mg tablet Generic drug:  ezetimibe Your last dose was: Your next dose is: Take  by mouth. STOP taking these medications   
 acetaminophen 325 mg tablet Commonly known as:  TYLENOL  
   
  
  
ASK your doctor about these medications Instructions Each Dose to Equal  
 Morning Noon Evening Bedtime NIACIN Your last dose was: Your next dose is:    
   
   
 by Does Not Apply route nightly. Where to Get Your Medications Information on where to get these meds will be given to you by the nurse or doctor. ! Ask your nurse or doctor about these medications OTHER  
 OTHER  
 warfarin 2 mg tablet Discharge Instructions Anemia From Heavy Bleeding: Care Instructions Your Care Instructions Anemia means that your body does not have enough red blood cells. Red blood cells carry oxygen around the body. When you have anemia, you may feel dizzy, tired, and weak.  You may also feel your heart pounding. For some people, it's hard to focus and think clearly. One common cause of anemia is bleeding. Bleeding from ulcers, hemorrhoids, cancer, or other problems can cause anemia. It may also be caused by heavy menstrual periods. Your treatment may include iron pills. Iron helps your body make hemoglobin. Hemoglobin is the part of the red blood cell that carries oxygen. If you have severe anemia, you may need a blood transfusion to give you red blood cells as quickly as possible. Sometimes it takes several months to get iron levels back to normal. 
Follow-up care is a key part of your treatment and safety. Be sure to make and go to all appointments, and call your doctor if you are having problems. It's also a good idea to know your test results and keep a list of the medicines you take. How can you care for yourself at home? · Be safe with medicines. Take your medicines exactly as prescribed. Call your doctor if you think you are having a problem with your medicine. · Follow your doctor's advice about eating foods that have a lot of iron in them. These include red meat, shellfish, poultry, and eggs. They also include beans, raisins, whole-grain bread, and leafy green vegetables. · Steam your vegetables. This is the best way to prepare them if you want to get as much iron as possible. · Iron pills can cause constipation. If you take them, there are things you can do to avoid constipation. Drink plenty of fluids, eat foods with a lot of fiber, and exercise every day. When should you call for help? Call 911 anytime you think you may need emergency care. For example, call if: 
? · You passed out (lost consciousness). ? · Your stools are maroon or very bloody. ?Call your doctor now or seek immediate medical care if: 
? · You are short of breath. ? · You have new or worse bleeding. ? · You are dizzy or light-headed, or you feel like you may faint. ?Watch closely for changes in your health, and be sure to contact your doctor if: 
? · You feel weaker or more tired than usual.  
? · You do not get better as expected. Where can you learn more? Go to http://dulce maria-bran.info/. Enter U909 in the search box to learn more about \"Anemia From Heavy Bleeding: Care Instructions. \" Current as of: October 13, 2016 Content Version: 11.4 © 2043-6390 Healthwise, Incorporated. Care instructions adapted under license by [employer] (which disclaims liability or warranty for this information). If you have questions about a medical condition or this instruction, always ask your healthcare professional. [address] any warranty or liability for your use of this information. Introducing [facility]! [employer] introduces [employer] patient portal. Now you can access parts of your medical record, email your doctor's office, and request medication refills online. 1. In your internet browser, go to [email or web address] 2. Click on the First Time User? Click Here link in the Sign In box. You will see the New Member Sign Up page. 3. Enter your [employer] Access Code exactly as it appears below. You will not need to use this code after youve completed the sign-up process. If you do not sign up before the expiration date, you must request a new code. · [employer] Access Code: [email or web address] Expires: 6/26/2018 11:34 AM 
 
4. Enter the last four digits of your Social Security Number (xxxx) and Date of Birth (mm/dd/yyyy) as indicated and click Submit. You will be taken to the next sign-up page. 5. Create a [employer] ID. This will be your [employer] login ID and cannot be changed, so think of one that is secure and easy to remember. 6. Create a [employer] password. You can change your password at any time. 7. Enter your Password Reset Question and Answer.  This can be used at a later time if you forget your password. 8. Enter your e-mail address. You will receive e-mail notification when new information is available in [address] 9. Click Sign Up. You can now view and download portions of your medical record. 10. Click the Download Summary menu link to download a portable copy of your medical information. If you have questions, please visit the Frequently Asked Questions section of the [employer] website. Remember, [employer] is NOT to be used for urgent needs. For medical emergencies, dial 911. Now available from your iPhone and Android! Introducing Rafael Melo As a [facility] patient, I wanted to make you aware of our electronic visit tool called Rafael Melo. [employer] allows you to connect within minutes with a medical provider 24 hours a day, seven days a week via a mobile device or tablet or logging into a secure website from your computer. You can access Rafael Melo from anywhere in the [place]. A virtual visit might be right for you when you have a simple condition and feel like you just dont want to get out of bed, or cant get away from work for an appointment, when your regular [facility] provider is not available (evenings, weekends or holidays), or when youre out of town and need minor care. Electronic visits cost only $49 and if the [employer] provider determines a prescription is needed to treat your condition, one can be electronically transmitted to a nearby pharmacy*. Please take a moment to enroll today if you have not already done so. The enrollment process is free and takes just a few minutes. To enroll, please download the [employer] marquita to your tablet or phone, or visit [email or web address] to enroll on your computer.    
And, as an [address] [place] [address] patient with a Live Mobile account, the results of your visits will be scanned into your electronic medical record and your primary care provider will be able to view the scanned results. We urge you to continue to see your regular [facility] provider for your ongoing medical care. And while your primary care provider may not be the one available when you seek a [facility] virtual visit, the peace of mind you get from getting a real diagnosis real time can be priceless. For more information on [facility], view our Frequently Asked Questions (FAQs) at [email or web address]. Sincerely, 
 
Braulio Graham MD 
Chief Medical Officer Shelbie Mina *:  certain medications cannot be prescribed via [facility] Providers Seen During Your Hospitalization Provider Specialty Primary office phone Jada Boykin MD Internal Medicine [phone] Louie Anaya MD Internal Medicine [phone] Emma Flowers MD Internal Medicine [phone] Manolo Austin MD Internal Medicine [phone] Your Primary Care Physician (PCP) Primary Care Physician Office Phone Office Fax Karishma Augusta [phone] You are allergic to the following Allergen Reactions Latex Unknown (comments) \"skin irritation\" Adhesive Unknown (comments) Penicillins Other (comments) Intolerance documented in [facility]born pt denies. Recent Documentation Height Weight BMI Smoking Status 1.803 m 89.1 kg 27.41 kg/m2 Former Smoker Emergency Contacts Name Discharge Info Relation Home Work Mobile Taryn Brennan DISCHARGE CAREGIVER [3] Spouse [3] [phone] Thanh Hartleycheryl  Spouse [3] [phone] Patient Belongings  The following personal items are in your possession at time of discharge: 
  Dental Appliances: None  Visual Aid: None      Home Medications: Sent home   Jewelry: Sent home  Clothing: None (everything taken home by wife) Other Valuables: None  Personal Items Sent to Safe: none Discharge Instructions Attachments/References WARFARIN SAFETY TIPS (ENGLISH) Patient Handouts Taking Warfarin Safely: Care Instructions Your Care Instructions Warfarin is a medicine that you take to prevent blood clots. It is often called a blood thinner. Doctors give warfarin (such as Coumadin) to reduce the risk of blood clots. You may be at risk for blood clots if you have atrial fibrillation or deep vein thrombosis. Some other health problems may also put you at risk. Warfarin slows the amount of time it takes for your blood to clot. It can cause bleeding problems. Even if you've been taking warfarin for a while, it's important to know how to take it safely. Foods and other medicines can affect the way warfarin works. Some can make warfarin work too well. This can cause bleeding problems. And some can make it work poorly, so that it does not prevent blood clots very well. You will need regular blood tests to check how long it takes for your blood to form a clot. This test is called a PT or prothrombin time test. The result of the test is called an INR level. Depending on the test results, your doctor or anticoagulation clinic may adjust your dose of warfarin. Follow-up care is a key part of your treatment and safety. Be sure to make and go to all appointments, and call your doctor if you are having problems. It's also a good idea to know your test results and keep a list of the medicines you take. How can you care for yourself at home? Take warfarin safely · Take your warfarin at the same time each day. · If you miss a dose of warfarin, don't take an extra dose to make up for it. Your doctor can tell you exactly what to do so you don't take too much or too little. · Wear medical alert jewelry that lets others know that you take warfarin. You can buy this at most drugstores. · Don't take warfarin if you are pregnant or planning to get pregnant. Talk to your doctor about how you can prevent getting pregnant while you are taking it. · Don't change your dose or stop taking warfarin unless your doctor tells you to. Effects of medicines and food on warfarin · Don't start or stop taking any medicines, vitamins, or natural remedies unless you first talk to your doctor. Many medicines can affect how warfarin works. These include aspirin and other pain relievers, over-the-counter medicines, multivitamins, dietary supplements, and herbal products. · Tell all of your doctors and pharmacists that you take warfarin. Some prescription medicines can affect how warfarin works. · Keep the amount of vitamin K in your diet about the same from day to day. Do not suddenly eat a lot more or a lot less food that is rich in vitamin K than you usually do. Vitamin K affects how warfarin works and how your blood clots. Talk with your doctor before making big changes in your diet. Foods that have a lot of vitamin K include cooked green vegetables, such as: ¨ Kale, spinach, turnip greens, galen greens, Swiss chard, and mustard greens. ¨ [place] sprouts, broccoli, and asparagus. · Limit your use of alcohol. Avoid bleeding by preventing falls and injuries · Wear slippers or shoes with nonskid soles. · Remove throw rugs and clutter. · Rearrange furniture and electrical cords to keep them out of walking paths. · Keep stairways, porches, and outside walkways well lit. Use night-lights in hallways and bathrooms. · Be extra careful when you work with sharp tools or knives. When should you call for help? Call 911 anytime you think you may need emergency care. For example, call if: 
? · You have a sudden, severe headache that is different from past headaches. ?Call your doctor now or seek immediate medical care if: 
? · You have any abnormal bleeding, such as: 
¨ Nosebleeds. ¨ Vaginal bleeding that is different (heavier, more frequent, at a different time of the month) than what you are used to. ¨ Bloody or black stools, or rectal bleeding. ¨ Bloody or pink urine. ? Watch closely for changes in your health, and be sure to contact your doctor if you have any problems. Where can you learn more? Go to http://dulce maria-bran.info/. Enter Y202 in the search box to learn more about \"Taking Warfarin Safely: Care Instructions. \" Current as of: September 21, 2016 Content Version: 11.4 © 6810-7202 [employer]. Care instructions adapted under license by [employer] (which disclaims liability or warranty for this information). If you have questions about a medical condition or this instruction, always ask your healthcare professional. [address] any warranty or liability for your use of this information. Please provide this summary of care documentation to your next provider. Signatures-by signing, you are acknowledging that this After Visit Summary has been reviewed with you and you have received a copy. Patient Signature:  ____________________________________________________________ Date:  ____________________________________________________________  
  
[facility] Provider Signature:  ____________________________________________________________ Date:  ____________________________________________________________

## 2018-04-07 NOTE — ROUTINE PROCESS
REC'D AND IN ICU BED. ALERT AND ORIENTED. NO ACUTE DISTRESS. SKIN WARM AND DRY. DR CANALES NOTIFIED OF ADMISSION.

## 2018-04-08 PROBLEM — K63.5 COLON POLYPS: Status: ACTIVE | Noted: 2018-04-08

## 2018-04-08 PROBLEM — D62 ACUTE BLOOD LOSS ANEMIA: Status: ACTIVE | Noted: 2018-04-08

## 2018-04-08 PROBLEM — I48.91 ATRIAL FIBRILLATION (HCC): Status: ACTIVE | Noted: 2018-04-08

## 2018-04-08 PROBLEM — Z95.1 S/P CABG (CORONARY ARTERY BYPASS GRAFT): Status: ACTIVE | Noted: 2018-04-08

## 2018-04-08 LAB
HCT VFR BLD AUTO: 24.7 % (ref 36–48)
HCT VFR BLD AUTO: 25.7 % (ref 36–48)
HCT VFR BLD AUTO: 26.1 % (ref 36–48)
HCT VFR BLD AUTO: 26.4 % (ref 36–48)
HGB BLD-MCNC: 8.3 G/DL (ref 13–16)
HGB BLD-MCNC: 8.5 G/DL (ref 13–16)
HGB BLD-MCNC: 8.6 G/DL (ref 13–16)
HGB BLD-MCNC: 8.7 G/DL (ref 13–16)

## 2018-04-08 PROCEDURE — 74011258636 HC RX REV CODE- 258/636: Performed by: INTERNAL MEDICINE

## 2018-04-08 PROCEDURE — 74011250637 HC RX REV CODE- 250/637: Performed by: INTERNAL MEDICINE

## 2018-04-08 PROCEDURE — 65660000004 HC RM CVT STEPDOWN

## 2018-04-08 PROCEDURE — 99218 HC RM OBSERVATION: CPT

## 2018-04-08 PROCEDURE — 96361 HYDRATE IV INFUSION ADD-ON: CPT

## 2018-04-08 PROCEDURE — 86920 COMPATIBILITY TEST SPIN: CPT | Performed by: INTERNAL MEDICINE

## 2018-04-08 PROCEDURE — 36415 COLL VENOUS BLD VENIPUNCTURE: CPT | Performed by: INTERNAL MEDICINE

## 2018-04-08 PROCEDURE — 86900 BLOOD TYPING SEROLOGIC ABO: CPT | Performed by: INTERNAL MEDICINE

## 2018-04-08 PROCEDURE — 85018 HEMOGLOBIN: CPT | Performed by: INTERNAL MEDICINE

## 2018-04-08 RX ORDER — SODIUM CHLORIDE 9 MG/ML
250 INJECTION, SOLUTION INTRAVENOUS AS NEEDED
Status: DISCONTINUED | OUTPATIENT
Start: 2018-04-08 | End: 2018-04-09 | Stop reason: HOSPADM

## 2018-04-08 RX ADMIN — ALLOPURINOL 300 MG: 300 TABLET ORAL at 09:08

## 2018-04-08 RX ADMIN — ASPIRIN 81 MG 81 MG: 81 TABLET ORAL at 09:07

## 2018-04-08 RX ADMIN — SERTRALINE HYDROCHLORIDE 50 MG: 50 TABLET ORAL at 09:07

## 2018-04-08 RX ADMIN — Medication 10 ML: at 14:00

## 2018-04-08 RX ADMIN — ATORVASTATIN CALCIUM 40 MG: 40 TABLET, FILM COATED ORAL at 18:02

## 2018-04-08 RX ADMIN — EZETIMIBE 10 MG: 10 TABLET ORAL at 09:07

## 2018-04-08 RX ADMIN — CAPTOPRIL 6.25 MG: 12.5 TABLET ORAL at 09:00

## 2018-04-08 RX ADMIN — CAPTOPRIL 6.25 MG: 12.5 TABLET ORAL at 18:02

## 2018-04-08 RX ADMIN — PANTOPRAZOLE SODIUM 40 MG: 40 TABLET, DELAYED RELEASE ORAL at 09:08

## 2018-04-08 RX ADMIN — SODIUM CHLORIDE, SODIUM LACTATE, POTASSIUM CHLORIDE, CALCIUM CHLORIDE AND DEXTROSE MONOHYDRATE 100 ML/HR: 5; 600; 310; 30; 20 INJECTION, SOLUTION INTRAVENOUS at 04:00

## 2018-04-08 RX ADMIN — CARVEDILOL 3.12 MG: 3.12 TABLET, FILM COATED ORAL at 18:02

## 2018-04-08 RX ADMIN — TAMSULOSIN HYDROCHLORIDE 0.4 MG: 0.4 CAPSULE ORAL at 18:02

## 2018-04-08 RX ADMIN — Medication 10 ML: at 04:02

## 2018-04-08 RX ADMIN — CARVEDILOL 3.12 MG: 3.12 TABLET, FILM COATED ORAL at 09:07

## 2018-04-08 NOTE — PROGRESS NOTES
[facility] Hospitalist Group  Progress Note    Patient: Merary Garcia Age: 82 y.o. : 1935 MR#: [id number] SSN: [id number]  Date: 2018     Subjective:         Assessment/Plan:     1- LGI Bleed in the setting of colonoscopy and polypectomy and on AC  2- Anemia  3- h/o MVR, tissue  4- h/o A Fib  5- CAD  6- HTN    PLAN  AC on hold  Monitor Hb, GI following  Cardio input noted  On asa, statin, BB, ace  Advanced care planning: full code  D/w patient and wife  Dispo- Home with Stefanie Castillo soon      Case discussed with:  []Patient  []Family  []Nursing  []Case Management  DVT Prophylaxis:  []Lovenox  []Hep SQ  []SCDs  []Coumadin   []On Heparin gtt    Objective:   VS:   Visit Vitals    /64    Pulse 76    Temp 97.3 °F (36.3 °C)    Resp 20    Ht 5' 11\" (1.803 m)    Wt 87.8 kg (193 lb 9.6 oz)    SpO2 96%    BMI 27 kg/m2      Tmax/24hrs: Temp (24hrs), Av.6 °F (36.4 °C), Min:97.3 °F (36.3 °C), Max:98 °F (36.7 °C)    Intake/Output Summary (Last 24 hours) at 18 1636  Last data filed at 18 1339   Gross per 24 hour   Intake             1400 ml   Output             2550 ml   Net            -1150 ml       General:  Awake, alert  Cardiovascular:  S1S2+, RRR  Pulmonary:  CTA b/l  GI:  Soft, BS+, NT, ND  Extremities:  No edema    Labs:    Recent Results (from the past 24 hour(s))   GLUCOSE, POC    Collection Time: 18  5:32 PM   Result Value Ref Range    Glucose (POC) 112 (H) 70 - 110 mg/dL   CBC WITH AUTOMATED DIFF    Collection Time: 18  6:30 PM   Result Value Ref Range    WBC 5.2 4.6 - 13.2 K/uL    RBC 3.05 (L) 4.70 - 5.50 M/uL    HGB 9.0 (L) 13.0 - 16.0 g/dL    HCT 27.0 (L) 36.0 - 48.0 %    MCV 88.5 74.0 - 97.0 FL    MCH 29.5 24.0 - 34.0 PG    MCHC 33.3 31.0 - 37.0 g/dL    RDW 13.9 11.6 - 14.5 %    PLATELET 429 236 - 586 K/uL    MPV 10.4 9.2 - 11.8 FL    NEUTROPHILS 53 40 - 73 %    LYMPHOCYTES 38 21 - 52 %    MONOCYTES 7 3 - 10 %    EOSINOPHILS 2 0 - 5 % BASOPHILS 0 0 - 2 %    ABS. NEUTROPHILS 2.8 1.8 - 8.0 K/UL    ABS. LYMPHOCYTES 2.0 0.9 - 3.6 K/UL    ABS. MONOCYTES 0.4 0.05 - 1.2 K/UL    ABS. EOSINOPHILS 0.1 0.0 - 0.4 K/UL    ABS. BASOPHILS 0.0 0.0 - 0.1 K/UL    DF AUTOMATED     METABOLIC PANEL, BASIC    Collection Time: 04/07/18  6:30 PM   Result Value Ref Range    Sodium 141 136 - 145 mmol/L    Potassium 4.1 3.5 - 5.5 mmol/L    Chloride 110 (H) 100 - 108 mmol/L    CO2 22 21 - 32 mmol/L    Anion gap 9 3.0 - 18 mmol/L    Glucose 100 (H) 74 - 99 mg/dL    BUN 20 (H) 7.0 - 18 MG/DL    Creatinine 1.05 0.6 - 1.3 MG/DL    BUN/Creatinine ratio 19 12 - 20      GFR est AA >60 >60 ml/min/1.73m2    GFR est non-AA >60 >60 ml/min/1.73m2    Calcium 8.9 8.5 - 10.1 MG/DL   MAGNESIUM    Collection Time: 04/07/18  6:30 PM   Result Value Ref Range    Magnesium 1.7 1.6 - 2.6 mg/dL   PHOSPHORUS    Collection Time: 04/07/18  6:30 PM   Result Value Ref Range    Phosphorus 3.0 2.5 - 4.9 MG/DL   PROTHROMBIN TIME + INR    Collection Time: 04/07/18  6:30 PM   Result Value Ref Range    Prothrombin time 14.7 11.5 - 15.2 sec    INR 1.2 0.8 - 1.2     TYPE & SCREEN    Collection Time: 04/07/18  6:30 PM   Result Value Ref Range    Crossmatch Expiration 04/10/2018     ABO/Rh(D) A POSITIVE     Antibody screen NEG    HGB & HCT    Collection Time: 04/08/18  5:22 AM   Result Value Ref Range    HGB 8.3 (L) 13.0 - 16.0 g/dL    HCT 24.7 (L) 36.0 - 48.0 %   TYPE + CROSSMATCH    Collection Time: 04/08/18  9:16 AM   Result Value Ref Range    Crossmatch Expiration 04/11/2018     ABO/Rh(D) A POSITIVE     Antibody screen NEG     CALLED TO: JESSI OAKES ON [id number] AT 1118 BY MAURICIO.      Unit number [id number]     Blood component type  LR     Unit division 00     Status of unit ALLOCATED     Crossmatch result Compatible    HGB & HCT    Collection Time: 04/08/18  9:16 AM   Result Value Ref Range    HGB 8.7 (L) 13.0 - 16.0 g/dL    HCT 26.1 (L) 36.0 - 48.0 %   HGB & HCT    Collection Time: 04/08/18 12:57 PM   Result Value Ref Range    HGB 8.6 (L) 13.0 - 16.0 g/dL    HCT 25.7 (L) 36.0 - 48.0 %       Signed By: Carlita Wilson MD     April 8, 2018 4:36 PM

## 2018-04-08 NOTE — ROUTINE PROCESS
Bedside and Verbal shift change report given to Solo Briseno (oncoming nurse) by Miriam Vazquez RN   (offgoing nurse). Report included the following information SBAR, Kardex, ED Summary, Procedure Summary, Intake/Output, MAR, Recent Results and Med Rec Status.

## 2018-04-08 NOTE — CONSULTS
[facility] - Consult Note    Consultation request by Dr. Eric Dover for advice/opinion related to evaluating anticoagulation    Date of  Admission: 4/7/2018  4:55 PM   Primary Care Physician:  Anayeli Hackett MD  Patient seen and examined independently. GIB with recent colonoscopy/polyp removals while being bridged with [address] and Lovenox. GI note appreciated. Will hold anticoagulants for now and check Echo in AM. Violeta Sarmiento MD   Assessment:     -Lower GI bleed in setting of chronic [address] use with recent colonoscopy 3/29/18 with rectal polypsx2, sigmoid polyp with clipx1, hot snare cecal polypsx2 with clipx1, transverse polypsx4 with clipx2, diverticulosis, internal hemorrhoids  -S/p MVR with tissue valve 2008; chordal sparing mitral valve replacement with a 29 mm [employer] Biocor Valve, on ASA + Coumadin as outpatient, instructed to stop coumadin after evening dose 3/24 and bridge with Lovenox 3/26 - 3/28 AM prior to colonoscopy 3/29  -Hx atrial fibrillation with cryoablation and radiofrequency ablation via AtriCure, removal of the left atrial appendage  -Echo 01/2018: EF 45%, normal LV cavity size with akinesis of the mid to apical anteroseptum, mildly dilated LA, normal RV size and systolic function, normally functioning porcine bioprosthetic mitral valve with no evidence of a valvular or perivalvular leak, diffuse sclerosis of the aortic valve without stenosis, est. RV systolic pressure is 29 mmHg  -Hx CAD, on ASA, Coreg, Lipitor  -S/p cardiac pacemaker   -Hx HTN  -Dyslipidemia    Primary cardiologist is Dr. Merary De Jesusak: We were consulted due to acute GI bleed in setting of recent colonoscopy 3/29/18 in setting of chronic OAC use with Lovenox bridge prior to procedure (pt had resumed Lovenox and Coumadin since procedure). Anticoagulation is being held at this time. Will check echocardiogram.     History of Present Illness:      This is a 82 y.o. male admitted for rectal bleeding and dizziness  BRBPR (bright red blood per rectum)  Lower GI bleed. Patient complains of:  BRBPR    Pt is an 81 yo M who presented to the hospital due to BRBPR. Pt has been experiencing dark stools since January. He was evaluated by GI and had colonoscopy 3/29/18, with results as noted above. He was previously taking Coumadin and had Lovenox bridge prior to the procedure. He reports that he first noticed some BRBPR on 4/2/18, his stools appeared normal 4/6/18 AM but then had 3 grossly bloody bowel movements after getting to the [place] that night, which he described as possible diarrhea. He presented to [address] [place] [address] ED after he noticed BRBPR while in the [facility]. On presentation, he noted that his Hgb had dropped from 10 to 8 and was given 2 units PRBC's. He reports that he had resumed both Coumadin and Lovenox after the procedure. Hospitalist contacted primary cardiology group per pt's request, and we were subsequently consulted for further recommendations regarding his chronic anticoagulation.       Cardiac risk factors: dyslipidemia, male gender, hypertension, Hx CAD      Review of Symptoms:  Except as stated above include:  Constitutional:  negative  Respiratory:  negative  Cardiovascular:  negative  Gastrointestinal: + BRBPR  Genitourinary:  negative  Musculoskeletal:  Negative  Neurological:  Negative  Dermatological:  Negative  Endocrinological: Negative  Psychological:  Negative       Past Medical History:     Past Medical History:   Diagnosis Date    Cancer ([address]     left kidney (patient states over 20 years ago)    Cataract 2007    Frequency     H/O kidney removal 1982    H/O skin graft 2007    Heart attack ([address] [phone]    Heart attack ([address]     Heart valve replaced 2008    Kidney stones     Nocturia     Pacemaker 2008    UTI (urinary tract infection)          Social History:     Social History     Social History    Marital status:      Spouse name: N/A    Number of children: N/A    Years of education: N/A     Social History Main Topics    Smoking status: Former Smoker    Smokeless tobacco: Never Used      Comment: quit in 1975    Alcohol use No    Drug use: No    Sexual activity: Not on file     Other Topics Concern    Not on file     Social History Narrative        Family History:   No family history on file. Medications: Allergies   Allergen Reactions    Latex Unknown (comments)     \"skin irritation\"    Adhesive Unknown (comments)    Penicillins Other (comments)     Intolerance documented in [facility], pt denies.         Current Facility-Administered Medications   Medication Dose Route Frequency    0.9% sodium chloride infusion 250 mL  250 mL IntraVENous PRN    dextrose 5% lactated ringers infusion  100 mL/hr IntraVENous CONTINUOUS    allopurinol (ZYLOPRIM) tablet 300 mg  300 mg Oral DAILY    aspirin chewable tablet 81 mg  81 mg Oral DAILY    atorvastatin (LIPITOR) tablet 40 mg  40 mg Oral PCD    captopril (CAPOTEN) tablet 6.25 mg  6.25 mg Oral BID    carvedilol (COREG) tablet 3.125 mg  3.125 mg Oral BID WITH MEALS    ezetimibe (ZETIA) tablet 10 mg  10 mg Oral DAILY    pantoprazole (PROTONIX) tablet 40 mg  40 mg Oral ACB    sertraline (ZOLOFT) tablet 50 mg  50 mg Oral DAILY    tamsulosin (FLOMAX) capsule 0.4 mg  0.4 mg Oral PCD    sodium chloride (NS) flush 5-10 mL  5-10 mL IntraVENous Q8H    sodium chloride (NS) flush 5-10 mL  5-10 mL IntraVENous PRN    acetaminophen (TYLENOL) tablet 650 mg  650 mg Oral Q4H PRN         Physical Exam:     Visit Vitals    /70    Pulse 72    Temp 97.6 °F (36.4 °C)    Resp 20    Ht 5' 11\" (1.803 m)    Wt 193 lb 9.6 oz (87.8 kg)    SpO2 95%    BMI 27 kg/m2     BP Readings from Last 3 Encounters:   04/08/18 129/70   04/03/18 118/62   03/29/18 135/70     Pulse Readings from Last 3 Encounters:   04/08/18 72   03/29/18 70     Wt Readings from Last 3 Encounters:   04/08/18 193 lb 9.6 oz (87.8 kg)   04/03/18 188 lb (85.3 kg)   03/29/18 195 lb (88.5 kg)       General:  alert, cooperative, no distress, appears stated age  Neck:  No JVD  Lungs:  clear to auscultation bilaterally  Heart:  Regular rate and rhythm  Abdomen:  abdomen is soft without significant tenderness, masses, organomegaly or guarding  Extremities:  no edema  Skin: Warm and dry. Neuro: alert, oriented x3, affect appropriate, no focal neurological deficits, moves all extremities well, no involuntary movements  Psych: non focal     Data Review:     Recent Labs      04/08/18   0522  04/07/18   1830   WBC   --   5.2   HGB  8.3*  9.0*   HCT  24.7*  27.0*   PLT   --   166     Recent Labs      04/07/18   1830   NA  141   K  4.1   CL  110*   CO2  22   GLU  100*   BUN  20*   CREA  1.05   CA  8.9   MG  1.7   PHOS  3.0   INR  1.2       No results found for this or any previous visit.     All Cardiac Markers in the last 24 hours:  No results found for: CPK, CK, CKMMB, CKMB, RCK3, CKMBT, CKNDX, CKND1, PALOMA, TROPT, TROIQ, MIKE, TROPT, TNIPOC, BNP, BNPP    Last Lipid:  No results found for: CHOL, CHOLX, CHLST, CHOLV, HDL, LDL, LDLC, DLDLP, TGLX, TRIGL, TRIGP, CHHD, CHHDX    Signed By: Puja Yusuf PA-C     April 8, 2018

## 2018-04-08 NOTE — ROUTINE PROCESS
Verbal and bedside Shift changed report given to Aleta Vizcarra RN (oncoming RN) on Pt. Condition. Report consisted of patients Situation, History, Activities, intake/output,  Background, Assessment and Recommendations(SBAR). Information from the following report(s) Kardex, order Summary, Lab results and MAR was reviewed with the receiving nurse. Opportunity for questions and clarification was provided.

## 2018-04-08 NOTE — CONSULTS
[facility]   [email or web address]      Impression:   1. LGI bleed, post polypectomy   Hx of MVR and CABG  Hx of a fib      Plan:     1. Observe  Hold anticoagulation if OK w/ cardiology  [place] and attempt at coagulation Rx if bleed persists        Chief Complaint:   hematochezia    HPI:  Moi Glover is a 82 y.o. male who is being seen on consult for the above. Around 1 1/2 wk ago this pt had colo at which time multiple colon polyps were removed; Several clips were applied. The pt had been on coumadin which was held and he was placed on a Lovenox bridge. He noted spotting of blood several days after the procedure. The lovenox was cont'd but Sat am he passed a large amount of BRB. Hgb fell from10.7 to 8. He has rec'd 2 units of blood and this am his Hgb is 8.3. No abd pain , N/V or NSAIDs use. Bleeding is bright red. I note that he has a biologic MVR. David Beauchamp PMH:   Past Medical History:   Diagnosis Date    Cancer [facility])     left kidney (patient states over 20 years ago)    Cataract 2007    Frequency     H/O kidney removal 1982    H/O skin graft 2007    Heart attack ([address] [phone]    Heart attack ([address]     Heart valve replaced 2008    Kidney stones     Nocturia     Pacemaker 2008    UTI (urinary tract infection)        PSH:   Past Surgical History:   Procedure Laterality Date    COLONOSCOPY N/A 3/29/2018    COLONOSCOPY with polypectomies, polyp bx and clip x4 performed by Lenore Muller MD at [address] [place] [address]  2011    HX HEART VALVE SURGERY  2008    HX NEPHRECTOMY Left     HX PACEMAKER  2008    SKIN GRAFT, HARVEST CULTURED TISSUE  2007       Social HX:   Social History     Social History    Marital status:      Spouse name: N/A    Number of children: N/A    Years of education: N/A     Occupational History    Not on file.      Social History Main Topics    Smoking status: Former Smoker    Smokeless tobacco: Never Used      Comment: quit in 1975    Alcohol use No    Drug use: No    Sexual activity: Not on file     Other Topics Concern    Not on file     Social History Narrative       FHX:   No family history on file. Allergy:   Allergies   Allergen Reactions    Latex Unknown (comments)     \"skin irritation\"    Adhesive Unknown (comments)    Penicillins Other (comments)     Intolerance documented in [place], pt denies. Home Medications:     Prescriptions Prior to Admission   Medication Sig    calcium-cholecalciferol, D3, (CALTRATE 600+D) tablet Take 1 Tab by mouth daily.  acetaminophen (TYLENOL) 325 mg tablet 325 mg.    levoFLOXacin (LEVAQUIN) 500 mg tablet Take 1 Tab by mouth daily.  omeprazole (PRILOSEC) 20 mg capsule     tamsulosin (FLOMAX) 0.4 mg capsule Take 1 Cap by mouth daily (after dinner). Indications: benign prostatic hyperplasia with lower urinary tract sx    allopurinol (ZYLOPRIM) 300 mg tablet daily (after dinner).  atorvastatin (LIPITOR) 40 mg tablet daily (after dinner).  carvedilol (COREG) 3.125 mg tablet two (2) times daily (with meals).  sertraline (ZOLOFT) 50 mg tablet take 1 tablet by mouth once daily    warfarin (COUMADIN) 2 mg tablet 2 mg. 2mg every day except Tuesday and Thursday  3mg Tuesday and Thursday    cholecalciferol (VITAMIN D3) 1,000 unit cap Take  by mouth daily.  ezetimibe (ZETIA) 10 mg tablet Take  by mouth.  OMEGA-3 ACID ETHYL ESTERS (LOVAZA PO) Take  by mouth. 2 tabs in am and 1 tab at night    FERROUS SULFATE PO Take  by mouth.  aspirin 81 mg tablet Take 81 mg by mouth.  NIACIN by Does Not Apply route nightly.  captopril (CAPOTEN) 12.5 mg tablet two (2) times a day. 1/2 tab BID    fluticasone (FLONASE) 50 mcg/actuation nasal spray instill 1 spray into each nostril twice a day       Review of Systems:     Constitutional: No fevers, chills, weight loss, fatigue. Skin: No rashes, pruritis, jaundice, ulcerations, erythema. HENT: No headaches, nosebleeds, sinus pressure, rhinorrhea, sore throat. Eyes: No visual changes, blurred vision, eye pain, photophobia, jaundice. Cardiovascular: No chest pain, heart palpitations. Respiratory: No cough, SOB, wheezing, chest discomfort, orthopnea. Gastrointestinal: bleeding   Genitourinary: No dysuria, bleeding, discharge, pyuria. Musculoskeletal: No weakness, arthralgias, wasting. Endo: No sweats. Heme: No bruising, easy bleeding. Allergies: As noted. Neurological: Cranial nerves intact. Alert and oriented. Gait not assessed. Psychiatric:  No anxiety, depression, hallucinations. Visit Vitals    /70    Pulse 72    Temp 97.6 °F (36.4 °C)    Resp 20    Ht 5' 11\" (1.803 m)    Wt 87.8 kg (193 lb 9.6 oz)    SpO2 95%    BMI 27 kg/m2       Physical Assessment:     constitutional: appearance: well developed, well nourished, normal habitus, no deformities, in no acute distress. skin: inspection: no rashes, ulcers, icterus or other lesions; no clubbing or telangiectasias. palpation: no induration or subcutaneos nodules. eyes: inspection: normal conjunctivae and lids; no jaundice pupils: symmetrical, normoreactive to light, normal accommodation and size. ENMT: mouth: normal oral mucosa,lips and gums; good dentition. oropharynx: normal tongue, hard and soft palate; posterior pharynx without erithema, exudate or lesions. neck: thyroid: normal size, consistency and position; no masses or tenderness. respiratory: effort: normal chest excursion; no intercostal retraction or accessory muscle use. cardiovascular: abdominal aorta: normal size and position; no bruits. palpation: PMI of normal size and position; normal rhythm; no thrill or murmurs. Scar ,midline sternotomy  abdominal: abdomen: normal consistency; no tenderness or masses. hernias: no hernias appreciated. liver: normal size and consistency. spleen: not palpable.    rectal: hemoccult/guaiac: not performed. musculoskeletal: digits and nails: no clubbing, cyanosis, petechiae or other inflammatory conditions. gait: normal gait and station head and neck: normal range of motion; no pain, crepitation or contracture. spine/ribs/pelvis: normal range of motion; no pain, deformity or contracture. lymphatic: axilae: not palpable. groin: not palpable. neck: within normal limits. other: not palpable. neurologic: cranial nerves: II-XII normal.   psychiatric: judgement/insight: within normal limits. memory: within normal limits for recent and remote events. mood and affect: no evidence of depression, anxiety or agitation. orientation: oriented to time, space and person. Basic Metabolic Profile   Recent Labs      04/07/18   1830   NA  141   K  4.1   CL  110*   CO2  22   BUN  20*   GLU  100*   CA  8.9   MG  1.7   PHOS  3.0         CBC w/Diff    Recent Labs      04/08/18   0522  04/07/18   1830   WBC   --   5.2   RBC   --   3.05*   HGB  8.3*  9.0*   HCT  24.7*  27.0*   MCV   --   88.5   MCH   --   29.5   MCHC   --   33.3   RDW   --   13.9   PLT   --   166    Recent Labs      04/07/18   1830   GRANS  53   LYMPH  38   EOS  2        Hepatic Function   No results for input(s): ALB, TP, TBILI, GPT, SGOT, AP, AML, LPSE in the last 72 hours. No lab exists for component: Rosangela Du MD, M.D. [facility], [address] [place] [address]  [email or web address]

## 2018-04-08 NOTE — ROUTINE PROCESS
Report given to huan Abreu re: meds,H&P,SBAR&KARDEX of the patient. Transferred to room 2309 per bed and telemtry monitor. Assessment unchanged and in satisfactory condition.

## 2018-04-08 NOTE — ROUTINE PROCESS
Bedside and Verbal shift change report given to Abilio Ralph RN  (oncoming nurse) by Naina Padgett RN (offgoing nurse). Report included the following information SBAR, Kardex, ED Summary, Recent Results and Cardiac Rhythm . Amilcar nAgeles

## 2018-04-08 NOTE — ROUTINE PROCESS
TRANSFER - in REPORT:     1749 Telephonel received from  [facility], [facility] (ICU RN)  on  on T Aracelis Dubois being transfer to [facility] room [phone]  for routine progression of care       Report consisted of patients Situation, Background, Assessment and   Recommendations(SBAR).    Information from the following report(s) SBAR, Kardex, OR Summary and MAR was reviewed with the receiving nurse.     Opportunity for questions and clarification was provided.       Will assume care as soon as Pt. Arrives to floor. 2350  Received Pt. Per bed, transferred without difficulty. Voided cyu. No discomfort noted. 0200  Pt. Resting in bed comfortably, no sign of discomfort. 0400  Pt. Denies pain. 0600  Pt. Able to rest well throughout the shift.

## 2018-04-09 VITALS
RESPIRATION RATE: 18 BRPM | HEIGHT: 71 IN | HEART RATE: 70 BPM | DIASTOLIC BLOOD PRESSURE: 67 MMHG | WEIGHT: 196.5 LBS | SYSTOLIC BLOOD PRESSURE: 130 MMHG | OXYGEN SATURATION: 96 % | BODY MASS INDEX: 27.51 KG/M2 | TEMPERATURE: 97.7 F

## 2018-04-09 LAB
ANION GAP SERPL CALC-SCNC: 7 MMOL/L (ref 3–18)
BASOPHILS # BLD: 0 K/UL (ref 0–0.1)
BASOPHILS NFR BLD: 1 % (ref 0–2)
BUN SERPL-MCNC: 12 MG/DL (ref 7–18)
BUN/CREAT SERPL: 12 (ref 12–20)
CALCIUM SERPL-MCNC: 9.1 MG/DL (ref 8.5–10.1)
CHLORIDE SERPL-SCNC: 107 MMOL/L (ref 100–108)
CO2 SERPL-SCNC: 29 MMOL/L (ref 21–32)
CREAT SERPL-MCNC: 1.01 MG/DL (ref 0.6–1.3)
DIFFERENTIAL METHOD BLD: ABNORMAL
EOSINOPHIL # BLD: 0.2 K/UL (ref 0–0.4)
EOSINOPHIL NFR BLD: 4 % (ref 0–5)
ERYTHROCYTE [DISTWIDTH] IN BLOOD BY AUTOMATED COUNT: 14.3 % (ref 11.6–14.5)
GLUCOSE SERPL-MCNC: 125 MG/DL (ref 74–99)
HCT VFR BLD AUTO: 25.7 % (ref 36–48)
HCT VFR BLD AUTO: 26.5 % (ref 36–48)
HGB BLD-MCNC: 8.6 G/DL (ref 13–16)
HGB BLD-MCNC: 9 G/DL (ref 13–16)
LYMPHOCYTES # BLD: 1.3 K/UL (ref 0.9–3.6)
LYMPHOCYTES NFR BLD: 30 % (ref 21–52)
MAGNESIUM SERPL-MCNC: 1.7 MG/DL (ref 1.6–2.6)
MCH RBC QN AUTO: 30 PG (ref 24–34)
MCHC RBC AUTO-ENTMCNC: 33.5 G/DL (ref 31–37)
MCV RBC AUTO: 89.5 FL (ref 74–97)
MONOCYTES # BLD: 0.5 K/UL (ref 0.05–1.2)
MONOCYTES NFR BLD: 11 % (ref 3–10)
NEUTS SEG # BLD: 2.3 K/UL (ref 1.8–8)
NEUTS SEG NFR BLD: 54 % (ref 40–73)
PLATELET # BLD AUTO: 185 K/UL (ref 135–420)
PMV BLD AUTO: 10.2 FL (ref 9.2–11.8)
POTASSIUM SERPL-SCNC: 3.8 MMOL/L (ref 3.5–5.5)
RBC # BLD AUTO: 2.87 M/UL (ref 4.7–5.5)
SODIUM SERPL-SCNC: 143 MMOL/L (ref 136–145)
WBC # BLD AUTO: 4.3 K/UL (ref 4.6–13.2)

## 2018-04-09 PROCEDURE — 97162 PT EVAL MOD COMPLEX 30 MIN: CPT

## 2018-04-09 PROCEDURE — 85018 HEMOGLOBIN: CPT | Performed by: INTERNAL MEDICINE

## 2018-04-09 PROCEDURE — 93306 TTE W/DOPPLER COMPLETE: CPT

## 2018-04-09 PROCEDURE — 97116 GAIT TRAINING THERAPY: CPT

## 2018-04-09 PROCEDURE — 36415 COLL VENOUS BLD VENIPUNCTURE: CPT | Performed by: EMERGENCY MEDICINE

## 2018-04-09 PROCEDURE — 74011258636 HC RX REV CODE- 258/636: Performed by: EMERGENCY MEDICINE

## 2018-04-09 PROCEDURE — 99218 HC RM OBSERVATION: CPT

## 2018-04-09 PROCEDURE — 97535 SELF CARE MNGMENT TRAINING: CPT

## 2018-04-09 PROCEDURE — 97165 OT EVAL LOW COMPLEX 30 MIN: CPT

## 2018-04-09 PROCEDURE — 74011250637 HC RX REV CODE- 250/637: Performed by: EMERGENCY MEDICINE

## 2018-04-09 PROCEDURE — 85025 COMPLETE CBC W/AUTO DIFF WBC: CPT | Performed by: EMERGENCY MEDICINE

## 2018-04-09 PROCEDURE — 96361 HYDRATE IV INFUSION ADD-ON: CPT

## 2018-04-09 PROCEDURE — 74011250637 HC RX REV CODE- 250/637: Performed by: INTERNAL MEDICINE

## 2018-04-09 PROCEDURE — 80048 BASIC METABOLIC PNL TOTAL CA: CPT | Performed by: EMERGENCY MEDICINE

## 2018-04-09 PROCEDURE — 83735 ASSAY OF MAGNESIUM: CPT | Performed by: EMERGENCY MEDICINE

## 2018-04-09 RX ORDER — WARFARIN 2 MG/1
TABLET ORAL
Qty: 10 TAB | Refills: 0 | Status: SHIPPED
Start: 2018-04-09

## 2018-04-09 RX ORDER — LEVOFLOXACIN 500 MG/1
500 TABLET, FILM COATED ORAL DAILY
Status: DISCONTINUED | OUTPATIENT
Start: 2018-04-09 | End: 2018-04-09

## 2018-04-09 RX ORDER — LEVOFLOXACIN 500 MG/1
500 TABLET, FILM COATED ORAL DAILY
Status: DISCONTINUED | OUTPATIENT
Start: 2018-04-09 | End: 2018-04-09 | Stop reason: HOSPADM

## 2018-04-09 RX ADMIN — EZETIMIBE 10 MG: 10 TABLET ORAL at 08:54

## 2018-04-09 RX ADMIN — CARVEDILOL 3.12 MG: 3.12 TABLET, FILM COATED ORAL at 08:54

## 2018-04-09 RX ADMIN — ALLOPURINOL 300 MG: 300 TABLET ORAL at 08:55

## 2018-04-09 RX ADMIN — SERTRALINE HYDROCHLORIDE 50 MG: 50 TABLET ORAL at 08:54

## 2018-04-09 RX ADMIN — CAPTOPRIL 6.25 MG: 12.5 TABLET ORAL at 08:54

## 2018-04-09 RX ADMIN — SODIUM CHLORIDE, SODIUM LACTATE, POTASSIUM CHLORIDE, CALCIUM CHLORIDE AND DEXTROSE MONOHYDRATE 60 ML/HR: 5; 600; 310; 30; 20 INJECTION, SOLUTION INTRAVENOUS at 08:59

## 2018-04-09 RX ADMIN — Medication 10 ML: at 14:00

## 2018-04-09 RX ADMIN — LEVOFLOXACIN 500 MG: 500 TABLET, FILM COATED ORAL at 11:52

## 2018-04-09 RX ADMIN — ASPIRIN 81 MG 81 MG: 81 TABLET ORAL at 08:54

## 2018-04-09 RX ADMIN — PANTOPRAZOLE SODIUM 40 MG: 40 TABLET, DELAYED RELEASE ORAL at 08:55

## 2018-04-09 NOTE — PROGRESS NOTES
[place]  [facility], [address]    Progress Note    Patient: Cathy Small MRN: [id number]  SSN: [id number]    YOB: 1935  Age: 82 y.o. Sex: male      Admit Date: 4/7/2018    LOS: 2 days     Chief complaint:gi bleed     Impression:     1. Probable post polypectomy bleed with acute anemia in an anticoagulated pt, now ceased   2. A fib   3. MVR    Plan:     1. Advance diet   2. Ok to [employer] home gi wise   3. Restart anti coagulation tomorrow     Call for questions or concerns available as needed       Subjective: The patient problems have remained stable no further bleeding,  No abd pain or bleeding. Objective:     Vitals:    04/09/18 0433 04/09/18 0800 04/09/18 0812 04/09/18 1144   BP:   126/69 124/73   Pulse:   72 81   Resp:   18 20   Temp:   97.9 °F (36.6 °C) 97.3 °F (36.3 °C)   SpO2:  97% 97% 97%   Weight: 89.1 kg (196 lb 8 oz)      Height:            Physical Exam:   GENERAL: alert, cooperative, no distress, appears stated age  ABDOMEN: soft, non-tender.  Bowel sounds normal. No masses,  no organomegaly    Lab/Data Review:  BMP:   Lab Results   Component Value Date/Time     04/09/2018 05:33 AM    K 3.8 04/09/2018 05:33 AM     04/09/2018 05:33 AM    CO2 29 04/09/2018 05:33 AM    AGAP 7 04/09/2018 05:33 AM     (H) 04/09/2018 05:33 AM    BUN 12 04/09/2018 05:33 AM    CREA 1.01 04/09/2018 05:33 AM    GFRAA >60 04/09/2018 05:33 AM    GFRNA >60 04/09/2018 05:33 AM     CMP:   Lab Results   Component Value Date/Time     04/09/2018 05:33 AM    K 3.8 04/09/2018 05:33 AM     04/09/2018 05:33 AM    CO2 29 04/09/2018 05:33 AM    AGAP 7 04/09/2018 05:33 AM     (H) 04/09/2018 05:33 AM    BUN 12 04/09/2018 05:33 AM    CREA 1.01 04/09/2018 05:33 AM    GFRAA >60 04/09/2018 05:33 AM    GFRNA >60 04/09/2018 05:33 AM    CA 9.1 04/09/2018 05:33 AM    MG 1.7 04/09/2018 05:33 AM     CBC:   Lab Results   Component Value Date/Time WBC 4.3 (L) 04/09/2018 05:33 AM    HGB 8.6 (L) 04/09/2018 05:33 AM    HCT 25.7 (L) 04/09/2018 05:33 AM     04/09/2018 05:33 AM     COAGS: No results found for: APTT, PTP, INR  Liver Panel: No results found for: ALB, CBIL, TBIL, TP, GLOB, AGRAT, SGOT, ASTPOC, ALTPOC, ALT, GPT, AP       Active Problems:    S/P MVR (mitral valve replacement) ()      Pacemaker ()      BRBPR (bright red blood per rectum) (4/7/2018)      Lower GI bleed (4/7/2018)      Acute blood loss anemia (4/8/2018)      Colon polyps (4/8/2018)      S/P CABG (coronary artery bypass graft) (4/8/2018)      Atrial fibrillation ([facility] (4/8/2018)          Signed By: Carmine Guillaume MD     April 9, 2018

## 2018-04-09 NOTE — PROGRESS NOTES
DISCHARGE SUMMARY from Nurse    PATIENT INSTRUCTIONS:    After general anesthesia or intravenous sedation, for 24 hours or while taking prescription Narcotics:  · Limit your activities  · Do not drive and operate hazardous machinery  · Do not make important personal or business decisions  · Do  not drink alcoholic beverages  · If you have not urinated within 8 hours after discharge, please contact your surgeon on call. Report the following to your surgeon:  · Excessive pain, swelling, redness or odor of or around the surgical area  · Temperature over 100.5  · Nausea and vomiting lasting longer than 4 hours or if unable to take medications  · Any signs of decreased circulation or nerve impairment to extremity: change in color, persistent  numbness, tingling, coldness or increase pain  · Any questions    What to do at Home:  Recommended activity: Activity as tolerated, use walker for ambulation. If you experience any of the following symptoms: bleeding, please follow up with your physician. *  Please give a list of your current medications to your Primary Care Provider. *  Please update this list whenever your medications are discontinued, doses are      changed, or new medications (including over-the-counter products) are added. *  Please carry medication information at all times in case of emergency situations. These are general instructions for a healthy lifestyle:    No smoking/ No tobacco products/ Avoid exposure to second hand smoke  Surgeon General's Warning:  Quitting smoking now greatly reduces serious risk to your health.     Obesity, smoking, and sedentary lifestyle greatly increases your risk for illness    A healthy diet, regular physical exercise & weight monitoring are important for maintaining a healthy lifestyle    You may be retaining fluid if you have a history of heart failure or if you experience any of the following symptoms:  Weight gain of 3 pounds or more overnight or 5 pounds in a week, increased swelling in our hands or feet or shortness of breath while lying flat in bed. Please call your doctor as soon as you notice any of these symptoms; do not wait until your next office visit. Recognize signs and symptoms of STROKE:    F-face looks uneven    A-arms unable to move or move unevenly    S-speech slurred or non-existent    T-time-call 911 as soon as signs and symptoms begin-DO NOT go       Back to bed or wait to see if you get better-TIME IS BRAIN. Warning Signs of HEART ATTACK     Call 911 if you have these symptoms:   Chest discomfort. Most heart attacks involve discomfort in the center of the chest that lasts more than a few minutes, or that goes away and comes back. It can feel like uncomfortable pressure, squeezing, fullness, or pain.  Discomfort in other areas of the upper body. Symptoms can include pain or discomfort in one or both arms, the back, neck, jaw, or stomach.  Shortness of breath with or without chest discomfort.  Other signs may include breaking out in a cold sweat, nausea, or lightheadedness. Don't wait more than five minutes to call [address]! Fast action can save your life. Calling 911 is almost always the fastest way to get lifesaving treatment. Emergency Medical Services staff can begin treatment when they arrive  up to an hour sooner than if someone gets to the hospital by car. The discharge information has been reviewed with the patient. The patient verbalized understanding. Discharge medications reviewed with the patient and appropriate educational materials and side effects teaching were provided.   ___________________________________________________________________________________________________________________________________

## 2018-04-09 NOTE — PROGRESS NOTES
Problem: Mobility Impaired (Adult and Pediatric)  Goal: *Acute Goals and Plan of Care (Insert Text)  Physical Therapy Goals  Initiated 4/9/2018 and to be accomplished within 7 day(s)  1. Patient will move from supine to sit and sit to supine , scoot up and down and roll side to side in bed with modified independence. 2.  Patient will transfer from bed to chair and chair to bed with supervision/set-up using the least restrictive device. 3.  Patient will perform sit to stand with supervision/set-up. 4.  Patient will ambulate with supervision/set-up for 200 feet with the least restrictive device. 5.  Patient will ascend/descend 14 stairs with 1 handrail(s) with supervision/set-up. Outcome: Progressing Towards Goal  physical Therapy EVALUATION    Patient: Stella Melchor (57 y.o. male)  Date: 4/9/2018  Primary Diagnosis: rectal bleeding and dizziness  BRBPR (bright red blood per rectum)  Lower GI bleed        Precautions:   Fall    ASSESSMENT :  PT orders received and patient cleared by nursing to participate with therapy. Patient is a 82 y.o. male admitted to the hospital due to [facility] and LGI bleed post polypectomy. Patient consents to PT evaluation and treatment. Pt performed bed mobility with SBA/supervision. Pt performed transfers with CGA with increased safety with RW. Pt ambulated initially 20 feet no AD but had less gait deviations using RW another 20 feet. Pt has fair dynamic standing balance. Pt will benefit from OOB more and asked nursing for a chair for pt's room. Educated on pt OOB 3-5 times a day with nursing assistance. Pt ended therapy supine in bed with HOB raised with all needs met. Patient will benefit from skilled intervention to address the above impairments and increase functional independence.   Patients rehabilitation potential is considered to be Good  Factors which may influence rehabilitation potential include:   []         None noted  []         Mental ability/status  [] Medical condition  []         Home/family situation and support systems  []         Safety awareness  []         Pain tolerance/management  []         Other:      PLAN :  Recommendations and Planned Interventions:  [x]           Bed Mobility Training             [x]    Neuromuscular Re-Education  [x]           Transfer Training                   []    Orthotic/Prosthetic Training  [x]           Gait Training                          []    Modalities  [x]           Therapeutic Exercises          []    Edema Management/Control  [x]           Therapeutic Activities            [x]    Patient and Family Training/Education  []           Other (comment):    Frequency/Duration: Patient will be followed by physical therapy 1-2 times per day to address goals. Discharge Recommendations: Home Health  Further Equipment Recommendations for Discharge: N/A     SUBJECTIVE:   Patient stated I am unsteady.     OBJECTIVE DATA SUMMARY:     Past Medical History:   Diagnosis Date    Cancer ([facility] [address]     left kidney (patient states over 20 years ago)    Cataract 2007    Frequency     H/O kidney removal 1982    H/O skin graft 2007    Heart attack ([facility] [address] [id number]    Heart attack ([facility] [address]     Heart valve replaced 2008    Kidney stones     Nocturia     Pacemaker 2008    UTI (urinary tract infection)      Past Surgical History:   Procedure Laterality Date    COLONOSCOPY N/A 3/29/2018    COLONOSCOPY with polypectomies, polyp bx and clip x4 performed by Caroline Page MD at [address] [place] [address]  2011    HX HEART VALVE SURGERY  2008    HX NEPHRECTOMY Left     HX PACEMAKER  2008    SKIN GRAFT, HARVEST CULTURED TISSUE  2007     Barriers to Learning/Limitations: None  Compensate with: N/A  Prior Level of Function/Home Situation: Independent with all mobility including gait no AD.    Home Situation  Home Environment: Private residence  # Steps to Enter: 2  Rails to Enter: No  One/Two Story Residence: Two story (is able to stay on 1st floor if needed)  # of Interior Steps: 6  Height of Each Step (in): 7 inches  Interior Rails: Both  Lift Chair Available: No  Living Alone: No  Support Systems: Spouse/Significant Other/Partner  Patient Expects to be Discharged to[de-identified] Private residence  Current DME Used/Available at Home: Commode, bedside, Cane, straight, Walker, rolling, Grab bars, Shower chair  Tub or Shower Type: Shower  Critical Behavior:  Neurologic State: Alert  Psychosocial  Patient Behaviors: Calm; Cooperative  Purposeful Interaction: Yes  Pt Identified Daily Priority: Clinical issues (comment)  Caritas Process: Nurture loving kindness; Attend basic human needs;Create healing environment  Caring Interventions: Therapeutic modalities; Reassure  Reassure: Caring rounds  Therapeutic Modalities: Humor; Intentional therapeutic touch  Strength:    Strength: Generally decreased, functional (B LE, 4 to 4+/5)  Tone & Sensation:   Tone: Normal (B LE)  Sensation: Intact (B LE)   Range Of Motion:  AROM: Within functional limits (B LE)  Functional Mobility:  Bed Mobility:  Supine to Sit: Supervision  Sit to Supine: Supervision  Scooting: Supervision  Transfers:  Sit to Stand: Contact guard assistance  Stand to Sit: Stand-by assistance  Balance:   Sitting: Intact  Standing: Impaired; With support  Standing - Static: Good  Standing - Dynamic : Fair  Ambulation/Gait Training:  Distance (ft): 40 Feet (ft) (20'x2)  Ambulation - Level of Assistance: Contact guard assistance  Base of Support: Widened  Speed/Ellie: Shuffled;Pace decreased (<100 feet/min)  Pain:  Pre: 0/10    Post: 0/10    Activity Tolerance:   fair  Please refer to the flowsheet for vital signs taken during this treatment.   After treatment:   [] Patient left in no apparent distress sitting up in chair  [] Patient left sitting on EOB  [x] Patient left in no apparent distress in bed  [] Patient declined to be OOB at this time due to    [x] Call bell left within reach  [x] Nursing notified(Renetta)  [x] Caregiver present  [] Bed alarm activated  [x] Personal items in reach     COMMUNICATION/EDUCATION:   [x]         Fall prevention education was provided and the patient/caregiver indicated understanding. [x]         Patient/family have participated as able in goal setting and plan of care. [x]         Patient/family agree to work toward stated goals and plan of care. []         Patient understands intent and goals of therapy, but is neutral about his/her participation. []         Patient is unable to participate in goal setting and plan of care. Thank you for this referral.  Neymar Junior, PT, DPT   Time Calculation: 25 mins      Mobility  Current  CJ= 20-39%   Goal  CI= 1-19%. The severity rating is based on the Level of Assistance required for Functional Mobility and ADLs.     Eval Complexity: History: MEDIUM  Complexity : 1-2 comorbidities / personal factors will impact the outcome/ POC Exam:HIGH Complexity : 4+ Standardized tests and measures addressing body structure, function, activity limitation and / or participation in recreation  Presentation: MEDIUM Complexity : Evolving with changing characteristics  Clinical Decision Making:Medium Complexity   Overall Complexity:MEDIUM

## 2018-04-09 NOTE — DISCHARGE INSTRUCTIONS
Anemia From Heavy Bleeding: Care Instructions  Your Care Instructions    Anemia means that your body does not have enough red blood cells. Red blood cells carry oxygen around the body. When you have anemia, you may feel dizzy, tired, and weak. You may also feel your heart pounding. For some people, it's hard to focus and think clearly. One common cause of anemia is bleeding. Bleeding from ulcers, hemorrhoids, cancer, or other problems can cause anemia. It may also be caused by heavy menstrual periods. Your treatment may include iron pills. Iron helps your body make hemoglobin. Hemoglobin is the part of the red blood cell that carries oxygen. If you have severe anemia, you may need a blood transfusion to give you red blood cells as quickly as possible. Sometimes it takes several months to get iron levels back to normal.  Follow-up care is a key part of your treatment and safety. Be sure to make and go to all appointments, and call your doctor if you are having problems. It's also a good idea to know your test results and keep a list of the medicines you take. How can you care for yourself at home? · Be safe with medicines. Take your medicines exactly as prescribed. Call your doctor if you think you are having a problem with your medicine. · Follow your doctor's advice about eating foods that have a lot of iron in them. These include red meat, shellfish, poultry, and eggs. They also include beans, raisins, whole-grain bread, and leafy green vegetables. · Steam your vegetables. This is the best way to prepare them if you want to get as much iron as possible. · Iron pills can cause constipation. If you take them, there are things you can do to avoid constipation. Drink plenty of fluids, eat foods with a lot of fiber, and exercise every day. When should you call for help? Call 911 anytime you think you may need emergency care. For example, call if:  ? · You passed out (lost consciousness).    ? · Your stools are maroon or very bloody. ?Call your doctor now or seek immediate medical care if:  ? · You are short of breath. ? · You have new or worse bleeding. ? · You are dizzy or light-headed, or you feel like you may faint. ? Watch closely for changes in your health, and be sure to contact your doctor if:  ? · You feel weaker or more tired than usual.   ? · You do not get better as expected. Where can you learn more? Go to http://dulce maria-bran.info/. Enter A235 in the search box to learn more about \"Anemia From Heavy Bleeding: Care Instructions. \"  Current as of: October 13, 2016  Content Version: 11.4  © 7526-5886 [employer]. Care instructions adapted under license by [employer] (which disclaims liability or warranty for this information). If you have questions about a medical condition or this instruction, always ask your healthcare professional. [place] [address] any warranty or liability for your use of this information.

## 2018-04-09 NOTE — PROGRESS NOTES
NUTRITION    Nursing Referral: [facility]  Nutrition Consult: General Nutrition Management & Supplements     RECOMMENDATIONS / PLAN:     - No nutrition intervention indicated at this time. Will re-screen as appropriate. NUTRITION INTERVENTIONS & DIAGNOSIS:     Nutrition Diagnosis: No nutrition diagnosis at this time. ASSESSMENT:     Pt out of room at time of visit. Tolerating diet and consuming 100% of meals. Average po intake adequate to meet patients estimated nutritional needs:   [x] Yes     [] No   [] Unable to determine at this time    Diet: DIET CARDIAC Regular      Food Allergies: NKFA  Current Appetite:   [x] Good     [] Fair     [] Poor     [] Other:  Appetite/meal intake prior to admission:   [] Good     [] Fair     [] Poor     [x] Other: unknown   Feeding Limitations:  [] Swallowing difficulty    [] Chewing difficulty    [] Other:  Current Meal Intake: Patient Vitals for the past 100 hrs:   % Diet Eaten   04/09/18 1313 100 %   04/09/18 0922 100 %   04/08/18 1659 100 %   04/08/18 1207 100 %     BM: 4/7   Skin Integrity: WDL  Edema: none   Pertinent Medications: Reviewed    Recent Labs      04/09/18   0533  04/07/18   1830   NA  143  141   K  3.8  4.1   CL  107  110*   CO2  29  22   GLU  125*  100*   BUN  12  20*   CREA  1.01  1.05   CA  9.1  8.9   MG  1.7  1.7   PHOS   --   3.0       Intake/Output Summary (Last 24 hours) at 04/09/18 1445  Last data filed at 04/09/18 1313   Gross per 24 hour   Intake             2140 ml   Output             1750 ml   Net              390 ml       Anthropometrics:  Ht Readings from Last 1 Encounters:   04/07/18 5' 11\" (1.803 m)     Last 3 Recorded Weights in this Encounter    04/07/18 1818 04/08/18 0123 04/09/18 0433   Weight: 85.9 kg (189 lb 6 oz) 87.8 kg (193 lb 9.6 oz) 89.1 kg (196 lb 8 oz)     Body mass index is 27.41 kg/(m^2).           Weight History: weight gain per chart     Weight Metrics 4/9/2018 4/3/2018 3/29/2018 1/11/2018 10/31/2016 9/22/2016 3/13/2013 Weight 196 lb 8 oz 188 lb 195 lb 180 lb 180 lb 185 lb 190 lb   BMI 27.41 kg/m2 26.22 kg/m2 27.2 kg/m2 25.1 kg/m2 25.1 kg/m2 25.8 kg/m2 26.51 kg/m2        Admitting Diagnosis: rectal bleeding and dizziness  BRBPR (bright red blood per rectum)  Lower GI bleed  Pertinent PMHx: kidney cancer and kidney removal, MVR, A fib    Education Needs:        [x] None identified  [] Identified - Not appropriate at this time  []  Identified and addressed - refer to education log  Learning Limitations:   [x] None identified  [] Identified    Cultural, Catholic & ethnic food preferences:  [x] None identified    [] Identified and addressed     ESTIMATED NUTRITION NEEDS:     Calories: 8982-7532 kcal (MSJx1.2-1.3) based on  [x] Actual BW 89 kg     [] IBW   Protein: 71-89 gm (0.8-1 gm/kg) based on  [x] Actual BW      [] IBW   Fluid: 1 mL/kcal     MONITORING & EVALUATION:     Nutrition Goal(s):   1. Po intake of meals will meet >75% of patient estimated nutritional needs within the next 7 days.   Outcome:  [] Met/Ongoing    []  Not Met    [x] New/Initial Goal     Monitoring:   [x] Food and beverage intake   [x] Diet order   [x] Nutrition-focused physical findings   [x] Treatment/therapy   [] Weight   [] Enteral nutrition intake        Previous Recommendations (for follow-up assessments only):     []   Implemented       []   Not Implemented (RD to address)      [] No Longer Appropriate     [] No Recommendation Made     Discharge Planning: cardiac diet   [x] Participated in care planning, discharge planning, & interdisciplinary rounds as appropriate      Cam Jordan, [address]    Pager: [phone]

## 2018-04-09 NOTE — PROGRESS NOTES
Patient in NAD, awake, alert, denies any bleeding. Denies any CP or sob, tolerated diet. Has been ambulating in room without any symptoms. Denies any bleeding or abdominal pain. D/w Dr Que Sunshine and also with Patients OP Cardiologist Dr Goff Aid- will resume coumadin from tomorrow ( without heparin/lovenox bridge ). Lengthy discussion with patient regarding plan and he agreed. He understand risk of bleeding on coumadin and also understands risk of stroke while subtherapeutic on coumadin. D/w RN. Home with [facility] today.  D/w

## 2018-04-09 NOTE — PROGRESS NOTES
SW DISCHARGE NOTE: SW [facility] Pt selected [facility]. FOC is in the chart. SW made referral via [address]. Care Management Interventions  PCP Verified by CM:  Yes Magali Robert MD )  Last Visit to PCP: 01/29/18  Mode of Transport at Discharge: [facility] Transport Time of Discharge: 1700  Transition of Care Consult (CM Consult): [address]: No  Reason Outside [place]:  (Pt choice)  Physical Therapy Consult: Yes  Occupational Therapy Consult: Yes  Speech Therapy Consult: No  Current Support Network: Lives with Spouse  Confirm Follow Up Transport: Family  Plan discussed with Pt/Family/Caregiver: Yes  Freedom of Choice Offered: Yes ([address] in the chart)  Discharge Location  Discharge Placement: Home with home health    DIONNE Lockhart LSW  [phone] (pager)

## 2018-04-09 NOTE — PROGRESS NOTES
SW NOTE: SW acknowledged CM consult for \"discharge planning. \" At this point the pt does not having any d/c needs. Care Management Interventions  PCP Verified by CM: Yes Dionisio Griffith MD )  Last Visit to PCP: 01/29/18  Mode of Transport at Discharge: BLS  Physical Therapy Consult: Yes  Occupational Therapy Consult: Yes  Speech Therapy Consult: No  Current Support Network: Lives with Spouse (Pt resides in a 2 story home with his wife. Pt denied using any DME.)  Confirm Follow Up Transport: Family  Discharge Location  Discharge Placement: Home    SW will be available for d/c planning.     DIONNE Abbott LSW  [phone] (pager)

## 2018-04-09 NOTE — PROGRESS NOTES
[facility]  -  Progress Note      Patient: Jordy Henley MRN: [id number]  SSN: [id number]    YOB: 1935  Age: 82 y.o.   Sex: male      Admit Date: 4/7/2018    Hospital Problem List:     Hospital Problems  Date Reviewed: 4/8/2018          Codes Class Noted POA    Acute blood loss anemia ICD-10-CM: D62  ICD-9-CM: 285.1  4/8/2018 Unknown        Colon polyps ICD-10-CM: K63.5  ICD-9-CM: 211.3  4/8/2018 Unknown        S/P CABG (coronary artery bypass graft) ICD-10-CM: Z95.1  ICD-9-CM: V45.81  4/8/2018 Unknown        Atrial fibrillation (HCC) ICD-10-CM: I48.91  ICD-9-CM: 427.31  4/8/2018 Unknown        BRBPR (bright red blood per rectum) ICD-10-CM: K62.5  ICD-9-CM: 569.3  4/7/2018 Unknown        Lower GI bleed ICD-10-CM: K92.2  ICD-9-CM: 578.9  4/7/2018 Unknown        S/P MVR (mitral valve replacement) ICD-10-CM: Z95.2  ICD-9-CM: V43.3  Unknown Yes        Pacemaker ICD-10-CM: Z95.0  ICD-9-CM: V45.01  Unknown Yes            -Lower GI bleed in setting of chronic OAC use with recent colonoscopy 3/29/18 with rectal polypsx2, sigmoid polyp with clipx1, hot snare cecal polypsx2 with clipx1, transverse polypsx4 with clipx2, diverticulosis, internal hemorrhoids  -S/p MVR with tissue valve 2008; chordal sparing mitral valve replacement with a 29 mm [employer] Biocor Valve, on ASA + Coumadin as outpatient, instructed to stop coumadin after evening dose 3/24 and bridge with Lovenox 3/26 - 3/28 AM prior to colonoscopy 3/29  -Hx atrial fibrillation with cryoablation and radiofrequency ablation via AtriCure, removal of the left atrial appendage  -Echo 01/2018: EF 45%, normal LV cavity size with akinesis of the mid to apical anteroseptum, mildly dilated LA, normal RV size and systolic function, normally functioning porcine bioprosthetic mitral valve with no evidence of a valvular or perivalvular leak, diffuse sclerosis of the aortic valve without stenosis, est. RV systolic pressure is 29 mmHg  -Hx CAD, on ASA, Coreg, Lipitor  -S/p cardiac pacemaker   -Hx HTN  -Dyslipidemia    Primary cardiologist is Dr. Callum Rodriguez:     -Echo order placed. Will review results.   -Coumadin on hold. Watch for bleeding. Can resume [address] when cleared by GI team.    Subjective:     No new complaints. Objective:      Patient Vitals for the past 8 hrs:   Temp Pulse Resp BP SpO2   04/09/18 0812 97.9 °F (36.6 °C) 72 18 126/69 97 %   04/09/18 0800 - - - - 97 %   04/09/18 0400 98.7 °F (37.1 °C) - 16 114/60 98 %         Patient Vitals for the past 96 hrs:   Weight   04/09/18 0433 89.1 kg (196 lb 8 oz)   04/08/18 0123 87.8 kg (193 lb 9.6 oz)   04/07/18 1818 85.9 kg (189 lb 6 oz)         Intake/Output Summary (Last 24 hours) at 04/09/18 1052  Last data filed at 04/09/18 9370   Gross per 24 hour   Intake             1660 ml   Output             2250 ml   Net             -590 ml       Physical Exam:  General: awake, alert, oriented x 3  Neck:  Supple, no jvd  Lungs:  Clear to auscultation bilat  Heart:  Reg rate and rhythm  Abdomen: soft, non-tender  Extremities: no edema, atraumatic    Data Review:     Labs: Results:       Chemistry Recent Labs      04/09/18   0533  04/07/18   1830   GLU  125*  100*   NA  143  141   K  3.8  4.1   CL  107  110*   CO2  29  22   BUN  12  20*   CREA  1.01  1.05   CA  9.1  8.9   MG  1.7  1.7   PHOS   --   3.0   AGAP  7  9   BUCR  12  19      CBC w/Diff Recent Labs      04/09/18   0533  04/08/18   2034  04/08/18   1257   04/07/18   1830   WBC  4.3*   --    --    --   5.2   RBC  2.87*   --    --    --   3.05*   HGB  8.6*  8.5*  8.6*   < >  9.0*   HCT  25.7*  26.4*  25.7*   < >  27.0*   PLT  185   --    --    --   166   GRANS  54   --    --    --   53   LYMPH  30   --    --    --   38   EOS  4   --    --    --   2    < > = values in this interval not displayed.       Cardiac Enzymes No results found for: CPK, CK, CKMMB, CKMB, RCK3, CKMBT, CKNDX, CKND1, PALOMA, TROPT, TROIQ, MIKE, TROPT, TNIPOC, BNP, BNPP Coagulation Recent Labs      04/07/18   1830   PTP  14.7   INR  1.2       Lipid Panel No results found for: CHOL, CHOLPOCT, CHOLX, CHLST, CHOLV, 305069, HDL, LDL, LDLC, DLDLP, 306992, VLDLC, VLDL, TGLX, TRIGL, TRIGP, TGLPOCT, CHHD, CHHDX   BNP No results found for: BNP, BNPP, XBNPT   Liver Enzymes No results for input(s): TP, ALB, TBIL, AP, SGOT, GPT in the last 72 hours.     No lab exists for component: DBIL   Digoxin    Thyroid Studies No results found for: T4, T3U, TSH, TSHEXT         Signed By: TIMA Matt     April 9, 2018

## 2018-04-10 ENCOUNTER — HOME HEALTH ADMISSION (OUTPATIENT)
Dept: HOME HEALTH SERVICES | Facility: HOME HEALTH | Age: 83
End: 2018-04-10

## 2018-04-10 LAB
ABO + RH BLD: NORMAL
BLD PROD TYP BPU: NORMAL
BLOOD GROUP ANTIBODIES SERPL: NORMAL
BPU ID: NORMAL
CALLED TO:,BCALL1: NORMAL
CROSSMATCH RESULT,%XM: NORMAL
SPECIMEN EXP DATE BLD: NORMAL
STATUS OF UNIT,%ST: NORMAL
UNIT DIVISION, %UDIV: 0

## 2018-04-11 ENCOUNTER — HOME CARE VISIT (OUTPATIENT)
Dept: SCHEDULING | Facility: HOME HEALTH | Age: 83
End: 2018-04-11

## 2018-04-24 NOTE — DISCHARGE SUMMARY
[address] [place] [address] Dr    DISCHARGE SUMMARY    Yamilka Caraballo  MR#: [id number]  : 1935  ACCOUNT #: [de-identified]   ADMIT DATE: 2018  DISCHARGE DATE: 2018    DISCHARGE DIAGNOSES:  1. Lower gastrointestinal bleed in the setting of recent colonoscopy and polypectomy and being on anticoagulation. 2.  Anemia. 3.  History of mitral valve replacement. 4.  Atrial fibrillation. 5.  Coronary artery disease. 6.  Hypertension. HOSPITAL COURSE:  This is an 63-year-old male who presented to the ER with complaints of rectal bleeding. The patient was evaluated and was admitted. Anticoagulation was held. Gastroenterology was consulted. Hemoglobin and hematocrit were monitored. Cardiology was also consulted. Bleeding subsided. Hemoglobin remained stable. Diet was advanced. The patient tolerated diet. Gastroenterology cleared the patient for discharge. I also called and discussed with the patient's outpatient cardiologist, Dr. Ozzy Younger, and the plan was to resume warfarin and without a bridge. This was discussed with the patient at length. The patient was discharged to home with home health care. Total time for the discharge was more than 35 minutes. The patient was hemodynamically stable at the time of discharge. The patient was advised to follow up with PCP in 1 week and with his cardiologist in 1 week and with gastroenterology in 2 weeks. DISCHARGE MEDICATIONS:  Include:   1. Coumadin 2 mg daily, except Tuesday and Thursday and take 3 mg on Tuesday and Thursday, and check PT/INR on 2018. Results to cardiologist, Dr. Ozzy Younger, immediately. Check a CBC, CMP, magnesium in 3 days. 2.  Caltrate 600 plus D 1 tablet p.o. daily. 3.  Levaquin 500 mg p.o. daily. 4.  Prilosec 20 mg p.o. daily. 5.  Flomax 0.4 mg p.o. daily. 6.  Allopurinol 300 mg p.o. daily. 7.  Lipitor 40 mg p.o. daily. 8.  Coreg 3.25 mg p.o. twice daily. 9.  Zoloft 50 mg p.o. daily.   10.  Vitamin D3 1000 units p.o. daily. 11.  Zetia 10 mg p.o. daily. 12.  Lovaza 2 tablets in the morning, 1 tab at night. 13.  Ferrous sulfate 325 mg p.o. daily. 14.  Aspirin 81 mg p.o. daily. 15.  Tylenol as needed. 16.  Captopril 6.25 mg p.o. twice daily. 17.  Flonase 1 spray each nostril twice daily. Total time for the discharge more than 35 minutes. Discharge plans were discussed with the patient.       Kwasi Lorenzana MD       VT/EN  D: 04/23/2018 20:36     T: 04/24/2018 11:28  JOB #: [id number]  CC: Maria Del Carmen Chávez MD  [address] [place] [address]

## 2018-05-08 RX ORDER — RIVAROXABAN 15 MG/1
TABLET, FILM COATED ORAL
Qty: 30 TABLET | Refills: 3 | Status: SHIPPED | OUTPATIENT
Start: 2018-05-08 | End: 2018-09-11 | Stop reason: SDUPTHER

## 2018-06-08 ENCOUNTER — OFFICE VISIT (OUTPATIENT)
Dept: CARDIOLOGY | Facility: CLINIC | Age: 83
End: 2018-06-08

## 2018-06-08 VITALS
BODY MASS INDEX: 27.35 KG/M2 | HEIGHT: 70 IN | DIASTOLIC BLOOD PRESSURE: 61 MMHG | WEIGHT: 191 LBS | SYSTOLIC BLOOD PRESSURE: 105 MMHG | HEART RATE: 81 BPM

## 2018-06-08 DIAGNOSIS — E11.8 TYPE 2 DIABETES MELLITUS WITH COMPLICATION, WITHOUT LONG-TERM CURRENT USE OF INSULIN (HCC): ICD-10-CM

## 2018-06-08 DIAGNOSIS — I48.20 CHRONIC ATRIAL FIBRILLATION (HCC): Primary | ICD-10-CM

## 2018-06-08 DIAGNOSIS — I49.5 SICK SINUS SYNDROME (HCC): ICD-10-CM

## 2018-06-08 DIAGNOSIS — R55 SYNCOPE, UNSPECIFIED SYNCOPE TYPE: ICD-10-CM

## 2018-06-08 DIAGNOSIS — E78.5 DYSLIPIDEMIA: ICD-10-CM

## 2018-06-08 PROCEDURE — 99214 OFFICE O/P EST MOD 30 MIN: CPT | Performed by: INTERNAL MEDICINE

## 2018-06-08 PROCEDURE — 93288 INTERROG EVL PM/LDLS PM IP: CPT | Performed by: INTERNAL MEDICINE

## 2018-06-08 RX ORDER — NEBIVOLOL 5 MG/1
5 TABLET ORAL DAILY
Qty: 30 TABLET | Refills: 6 | Status: SHIPPED | OUTPATIENT
Start: 2018-06-08 | End: 2018-12-10

## 2018-06-08 NOTE — PROGRESS NOTES
Subjective:     Encounter Date:06/08/2018    Patient ID: Micha Crockett is an 83 y.o.  white male, a , from [place], [place].       PHYSICIAN: Anthony Fried MD  PREVIOUS CARDIOLOGIST: UGO Le  ELECTROPHYSIOLOGIST: ROSIBEL  ORTHOPEDIST: Vitaly Omer MD    Chief Complaint:   Chief Complaint   Patient presents with   • Atrial Fibrillation     Problem List:  1. Remote paroxysmal atrial fibrillation with now chronic sustained atrial fibrillation and progressive sick sinus syndrome:  a. Diagnosed in [facility], in April 2014.   b. CHADS-VASc score of 4 with anticoagulation on Xarelto.  c. Successful cardioversion with restoration of sinus rhythm with advanced trifascicular block with ME interval of 292 msec in the setting of CRBB/LAHB, on 04/29/2014, by Dr. Slaughter.   d. Return of atrial fibrillation with Holter monitor.  e. Holter monitor for 48 hours with a minimum heart rate of 42 beats per minute and max of 138 beats per minute, with ventricular ectopy less than 1% of the time and supraventricular ectopy 2% of the time, 03/27/2015.  f. Echocardiogram showing estimated EF of 61% with mild-to-moderate LVH and left atrium slightly dilated, and right ventricle slightly dilated, and moderate aortic cuff sclerosis, and mild aortic regurgitation with mild mitral regurgitation, and mild-to-moderate tricuspid regurgitation.  g. Implantation of [employer] single-chamber permanent pacemaker on 07/13/2015 by Dr. Cano, with acceptable interrogation and no reprogramming, April 2017, with subsequent acceptable remote check, August 2017.  2. Syncopal episodes secondary to bradycardia.   3. Hypertension.   4. Dyslipidemia.   5. Diabetes mellitus type 2, hemoglobin  A1c 6.7%, July 2015.   6. Chronic right bundle branch block.   7. Degenerative joint disease.   8. Ventral hernia.   9. Chronic kidney disease.   10. Obstructive sleep apnea with the use of CPAP.  "  11. Surgical history:  a. Appendectomy.   b. Pilonidal cyst.  c. 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  d. Prostatectomy.       No Known Allergies      Current Outpatient Prescriptions:   •  amLODIPine (NORVASC) 10 MG tablet, Take 10 mg by mouth Daily., Disp: , Rfl: 0  •  aspirin 81 MG tablet, Take 81 mg by mouth Daily., Disp: , Rfl:   •  benazepril (LOTENSIN) 20 MG tablet, Take 20 mg by mouth Daily., Disp: , Rfl: 1  •  betamethasone valerate (VALISONE) 0.1 % cream, APPLY TO AFFECTED AREA 3 TIMES A DAY AND AS NEEDED FOR ITCHING, Disp: , Rfl: 0  •  erythromycin (ROMYCIN) 5 MG/GM ophthalmic ointment, Administer  to both eyes At Night As Needed., Disp: , Rfl:   •  fenofibrate 160 MG tablet, Take 160 mg by mouth Daily., Disp: , Rfl: 0  •  hydrochlorothiazide (HYDRODIURIL) 25 MG tablet, Take 12.5 mg by mouth Daily., Disp: , Rfl: 1  •  metFORMIN XR (GLUCOPHAGE-XR) 500 MG 24 hr tablet, Take 500 mg by mouth Daily., Disp: , Rfl: 1  •  pravastatin (PRAVACHOL) 20 MG tablet, Take 20 mg by mouth Daily., Disp: , Rfl: 1  •  XARELTO 15 MG tablet, TAKE 1 TABLET BY MOUTH EVERY DAY WITH DINNER, Disp: 30 tablet, Rfl: 3    HISTORY OF PRESENT ILLNESS: Patient returns for scheduled followup after a 6-1/2 month hiatus.  He says he is \"doing great.\"  He is accompanied to the office today by his wife, and he says that they did not go to [place] this year for the winter but regretted it because of how cold it was.  They lived in [place] for 26 years when he worked for [employer] and love it there.  He is up and about on a daily basis and has no symptoms from a cardiopulmonary perspective with his activities.  He has had no chest pain, tightness, or pressure.  They will plan to go to [place] this coming winter and will leave around the first of January 2019.  Patient otherwise denies chest pain, shortness of breath, PND, edema, palpitations, syncope or presyncope at this time.        Review of Systems   Hematologic/Lymphatic: " "Bruises/bleeds easily.      Obtained and otherwise negative except as outlined in problem list and HPI.    Procedures [employer] [place] K172 - VVIR set at 80/minute with ventricular paced 99%, R-wave 9.8 volts, threshold 0.4 volts at 0.4 ms with impedance of 616 ohms.  Battery voltage 7 years.  550 total episodes of VHR suggestive of rapid atrial fibrillation.  Actively transmitting with home monitor in place.  No reprogramming changes.       Objective:       Vitals:    06/08/18 1110 06/08/18 1111   BP: 124/76 105/61   BP Location: Left arm Left arm   Patient Position: Sitting Standing   Pulse: 86 81   Weight: 86.6 kg (191 lb) 86.6 kg (191 lb)   Height: 177.8 cm (70\") 177.8 cm (70\")     Body mass index is 27.41 kg/m².   Last weight:  193 lbs.    Physical Exam   Constitutional: He is oriented to person, place, and time. He appears well-developed and well-nourished.   Neck: No JVD present. Carotid bruit is not present. No thyromegaly present.   Cardiovascular: S1 normal and S2 normal.  An irregularly irregular rhythm present. Exam reveals no gallop, no S3 and no friction rub.    Murmur heard.   Medium-pitched early systolic murmur is present with a grade of 2/6  at the lower left sternal border  Pulses:       Carotid pulses are 1+ on the right side, and 1+ on the left side.       Radial pulses are 1+ on the right side, and 1+ on the left side.        Femoral pulses are 1+ on the right side, and 1+ on the left side.       Popliteal pulses are 1+ on the right side, and 1+ on the left side.        Dorsalis pedis pulses are 1+ on the right side, and 1+ on the left side.        Posterior tibial pulses are 1+ on the right side, and 1+ on the left side.   Pulmonary/Chest: Effort normal and breath sounds normal. He has no wheezes. He has no rhonchi. He has no rales.   Left precordial pacemaker site is nominal   Abdominal: Soft. He exhibits no mass. There is no hepatosplenomegaly. There is no tenderness. There is no " guarding.   Bowel sounds audible x4   Musculoskeletal: Normal range of motion. He exhibits no edema.   Lymphadenopathy:     He has no cervical adenopathy.   Neurological: He is alert and oriented to person, place, and time.   Skin: Skin is warm, dry and intact. No rash noted.   Vitals reviewed.        Lab Review:   Lab Results   Component Value Date    GLUCOSE 107 (H) 07/15/2015    BUN 28 (H) 07/15/2015    CREATININE 1.4 (H) 07/15/2015    CO2 29 07/15/2015    CALCIUM 8.8 07/15/2015    ALBUMIN 4.0 07/12/2015    AST 17 07/12/2015    ALT 16 07/12/2015       Lab Results   Component Value Date    WBC 10.47 07/14/2015    HGB 12.0 (L) 07/14/2015    HCT 37.3 (L) 07/14/2015    MCV 86.9 07/14/2015     07/14/2015       Lab Results   Component Value Date    HGBA1C 6.7 (H) 07/12/2015       Lab Results   Component Value Date    TSH 3.548 07/12/2015       Lab Results   Component Value Date     (H) 07/12/2015           Assessment:   Overall continued acceptable course with no interim cardiopulmonary complaints with good functional status. We will defer additional diagnostic or therapeutic intervention from a cardiac perspective at this time.  He is asked to share laboratory studies with us if at all possible.       Diagnosis Plan   1. Chronic atrial fibrillation  See below   2. Syncope, unspecified syncope type  Acceptable pacemaker interrogation; see below   3. Sick sinus syndrome  See below   4. Type 2 diabetes mellitus with complication, without long-term current use of insulin  No data to review   5. Dyslipidemia  No data to review          Plan:         1. Patient to continue current medications and close follow up with the above providers with the following changes:   A. Discontinue amlodipine   B. Initiate nebivolol 5 mg daily   C. The patient will call back in 2-3 weeks to update us on his symptoms and blood pressure readings  2. Tentative cardiology follow up in December 2018, or patient may return sooner  PRN.       Transcribed by Mirna Damon for Dr. Daniel Slaughter at 11:18 AM on 06/08/2018    I, Daniel Slaughter MD, [facility], personally performed the services described in this documentation as scribed by the above named individual in my presence, and it is both accurate and complete. At 12:10 PM on 06/08/2018

## 2018-08-14 ENCOUNTER — CLINICAL SUPPORT NO REQUIREMENTS (OUTPATIENT)
Dept: CARDIOLOGY | Facility: CLINIC | Age: 83
End: 2018-08-14

## 2018-08-14 DIAGNOSIS — I49.5 SICK SINUS SYNDROME (HCC): ICD-10-CM

## 2018-08-14 PROCEDURE — 93294 REM INTERROG EVL PM/LDLS PM: CPT | Performed by: INTERNAL MEDICINE

## 2018-08-14 PROCEDURE — 93296 REM INTERROG EVL PM/IDS: CPT | Performed by: INTERNAL MEDICINE

## 2018-09-11 RX ORDER — RIVAROXABAN 15 MG/1
TABLET, FILM COATED ORAL
Qty: 30 TABLET | Refills: 6 | Status: SHIPPED | OUTPATIENT
Start: 2018-09-11 | End: 2019-05-12 | Stop reason: SDUPTHER

## 2018-10-18 RX ORDER — RIVAROXABAN 15 MG/1
TABLET, FILM COATED ORAL
Qty: 30 TABLET | Refills: 3 | OUTPATIENT
Start: 2018-10-18

## 2018-11-28 ENCOUNTER — CLINICAL SUPPORT NO REQUIREMENTS (OUTPATIENT)
Dept: CARDIOLOGY | Facility: CLINIC | Age: 83
End: 2018-11-28

## 2018-11-28 DIAGNOSIS — I49.5 SICK SINUS SYNDROME (HCC): ICD-10-CM

## 2018-11-28 DIAGNOSIS — I48.20 CHRONIC ATRIAL FIBRILLATION (HCC): ICD-10-CM

## 2018-11-28 PROCEDURE — 93294 REM INTERROG EVL PM/LDLS PM: CPT | Performed by: INTERNAL MEDICINE

## 2018-11-28 PROCEDURE — 93296 REM INTERROG EVL PM/IDS: CPT | Performed by: INTERNAL MEDICINE

## 2018-12-10 RX ORDER — BISOPROLOL FUMARATE 5 MG/1
5 TABLET, FILM COATED ORAL DAILY
Qty: 30 TABLET | Refills: 5 | Status: SHIPPED | OUTPATIENT
Start: 2018-12-10 | End: 2019-01-02

## 2018-12-19 RX ORDER — NEBIVOLOL 5 MG/1
5 TABLET ORAL DAILY
Qty: 90 TABLET | Refills: 0 | Status: SHIPPED | OUTPATIENT
Start: 2018-12-19 | End: 2019-03-18 | Stop reason: SDUPTHER

## 2019-01-02 ENCOUNTER — OFFICE VISIT (OUTPATIENT)
Dept: CARDIOLOGY | Facility: CLINIC | Age: 84
End: 2019-01-02

## 2019-01-02 VITALS
SYSTOLIC BLOOD PRESSURE: 126 MMHG | HEART RATE: 80 BPM | BODY MASS INDEX: 26.8 KG/M2 | HEIGHT: 70 IN | DIASTOLIC BLOOD PRESSURE: 78 MMHG | WEIGHT: 187.2 LBS

## 2019-01-02 DIAGNOSIS — N18.30 STAGE 3 CHRONIC KIDNEY DISEASE (HCC): ICD-10-CM

## 2019-01-02 DIAGNOSIS — E11.8 TYPE 2 DIABETES MELLITUS WITH COMPLICATION, WITHOUT LONG-TERM CURRENT USE OF INSULIN (HCC): ICD-10-CM

## 2019-01-02 DIAGNOSIS — I48.20 CHRONIC ATRIAL FIBRILLATION (HCC): Primary | ICD-10-CM

## 2019-01-02 DIAGNOSIS — R55 SYNCOPE, UNSPECIFIED SYNCOPE TYPE: ICD-10-CM

## 2019-01-02 DIAGNOSIS — I49.5 SICK SINUS SYNDROME (HCC): ICD-10-CM

## 2019-01-02 DIAGNOSIS — E78.5 DYSLIPIDEMIA: ICD-10-CM

## 2019-01-02 DIAGNOSIS — I10 ESSENTIAL HYPERTENSION: ICD-10-CM

## 2019-01-02 PROCEDURE — 99214 OFFICE O/P EST MOD 30 MIN: CPT | Performed by: INTERNAL MEDICINE

## 2019-01-02 PROCEDURE — 93288 INTERROG EVL PM/LDLS PM IP: CPT | Performed by: INTERNAL MEDICINE

## 2019-01-02 NOTE — PROGRESS NOTES
Subjective:     Encounter Date:01/02/2019    Patient ID: Micha Crockett is an 83 y.o.  white male, a , from [place], [place].       PHYSICIAN: Anthony Fried MD  PREVIOUS CARDIOLOGIST: UGO Le  ELECTROPHYSIOLOGIST: ROSIBEL  ORTHOPEDIST: Vitaly Omer MD    Chief Complaint:   Chief Complaint   Patient presents with   • Atrial Fibrillation   • Sick sinus syndrome     Problem List:  1. Remote paroxysmal atrial fibrillation with now chronic sustained atrial fibrillation and progressive sick sinus syndrome:  a. Diagnosed in [facility], in April 2014.   b. CHADS-VASc score of 4 with anticoagulation on Xarelto.  c. Successful cardioversion with restoration of sinus rhythm with advanced trifascicular block with IL interval of 292 msec in the setting of CRBB/LAHB, on 04/29/2014, by Dr. Slaughter.   d. Return of atrial fibrillation with Holter monitor.  e. Holter monitor for 48 hours with a minimum heart rate of 42 beats per minute and max of 138 beats per minute, with ventricular ectopy less than 1% of the time and supraventricular ectopy 2% of the time, 03/27/2015.  f. Echocardiogram showing estimated EF of 61% with mild-to-moderate LVH and left atrium slightly dilated, and right ventricle slightly dilated, and moderate aortic cuff sclerosis, and mild aortic regurgitation with mild mitral regurgitation, and mild-to-moderate tricuspid regurgitation.  g. Implantation of [employer] single-chamber permanent pacemaker on 07/13/2015 by Dr. Cano, with acceptable interrogation and no reprogramming, April 2017, with subsequent acceptable remote check, August 2017, November 2018, with acceptable interrogation, January 2019.  2. Syncopal episodes secondary to bradycardia.   3. Hypertension.   4. Dyslipidemia.   5. Diabetes mellitus type 2, hemoglobin  A1c 6.7%, July 2015; 6.6%, December 2018  6. Chronic right bundle branch block.   7. Degenerative joint disease.  "  8. Ventral hernia.   9. Chronic kidney disease.   10. Obstructive sleep apnea with the use of CPAP.   11. Surgical history:  a. Appendectomy.   b. Pilonidal cyst.  c. 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  d. Prostatectomy.     No Known Allergies    Current Outpatient Medications:   •  aspirin 81 MG tablet, Take 81 mg by mouth Daily., Disp: , Rfl:   •  benazepril (LOTENSIN) 20 MG tablet, Take 20 mg by mouth Daily., Disp: , Rfl: 1  •  betamethasone valerate (VALISONE) 0.1 % cream, APPLY TO AFFECTED AREA 3 TIMES A DAY AND AS NEEDED FOR ITCHING, Disp: , Rfl: 0  •  erythromycin (ROMYCIN) 5 MG/GM ophthalmic ointment, Administer  to both eyes At Night As Needed., Disp: , Rfl:   •  fenofibrate 160 MG tablet, Take 160 mg by mouth Daily., Disp: , Rfl: 0  •  hydrochlorothiazide (HYDRODIURIL) 25 MG tablet, Take 12.5 mg by mouth Daily., Disp: , Rfl: 1  •  metFORMIN XR (GLUCOPHAGE-XR) 500 MG 24 hr tablet, Take 500 mg by mouth Daily., Disp: , Rfl: 1  •  nebivolol (BYSTOLIC) 5 MG tablet, Take 1 tablet by mouth Daily., Disp: 90 tablet, Rfl: 0  •  pravastatin (PRAVACHOL) 20 MG tablet, Take 20 mg by mouth Daily., Disp: , Rfl: 1  •  XARELTO 15 MG tablet, TAKE 1 TABLET BY MOUTH EVERY DAY WITH DINNER, Disp: 30 tablet, Rfl: 6    HISTORY OF PRESENT ILLNESS: Patient returns for scheduled followup after a 7-month hiatus.  His only complaint today is fatigue, and he states that a few weeks ago, he had a cold, and Dr. Fried \"gave me a shot\" and gave him antibiotics that cleared up his cough, but ever since then, he has just felt tired.  Other than that, he feels good and has no chest pain, tightness, or pressure.  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  He is encouraged to stay hydrated.  He had mentioned to Dr. Fried that he was not sleeping as well as he should, and Dr. Fried did not want " "to get him started on a prescription for sleep. Therefore, the patient asked at the health store, and they suggested hemp oil.  The use of melatonin is discussed with him, and it is suggested that he try this first.  He says that he does not have any trouble going to sleep but only sleeps for about 4 hours before he wakes up and cannot get back to sleep.  He is normally active on a daily basis and has no symptoms from a cardiopulmonary perspective with his activities.  He has had a flu shot and a hepatitis A vaccine.  Patient otherwise denies chest pain, shortness of breath, PND, edema, palpitations, syncope or presyncope at this time.  He is accompanied to the office today by his wife.        Review of Systems   Constitution: Positive for malaise/fatigue.      Obtained and otherwise negative except as outlined in problem list and HPI.    Procedures [employer] K172 VVI PM - 100% RV paced, pacemaker dependent, threshold 0.7 volts at 0.5 ms.  Impedance 659 ohms.  Battery voltage 7 years.  Five episodes of brief nonsustained ventricular tachycardia.  No reprogrammin.       Objective:       Vitals:    01/02/19 1409 01/02/19 1410   BP: 140/86 126/78   BP Location: Left arm Left arm   Patient Position: Sitting Standing   Pulse: 89 80   Weight: 84.9 kg (187 lb 3.2 oz)    Height: 177.8 cm (70\")      Body mass index is 26.86 kg/m².   Last weight:  191 lbs.    Physical Exam   Constitutional: He is oriented to person, place, and time. He appears well-developed and well-nourished.   Neck: No JVD present. Carotid bruit is not present. No thyromegaly present.   Cardiovascular: Regular rhythm, S1 normal and S2 normal. Exam reveals no gallop, no S3 and no friction rub.   Murmur heard.   Medium-pitched harsh early systolic murmur is present with a grade of 1/6 at the upper right sternal border.  Pulses:       Carotid pulses are 1+ on the right side, and 1+ on the left side.       Radial pulses are 1+ on the right side, " and 1+ on the left side.        Femoral pulses are 1+ on the right side, and 1+ on the left side.       Popliteal pulses are 1+ on the right side, and 1+ on the left side.        Dorsalis pedis pulses are 1+ on the right side, and 1+ on the left side.        Posterior tibial pulses are 1+ on the right side, and 1+ on the left side.   Pulmonary/Chest: Effort normal. He has decreased breath sounds. He has no wheezes. He has no rhonchi. He has no rales.   Left precordial pacemaker site is nominal   Abdominal: Soft. He exhibits no mass. There is no hepatosplenomegaly. There is no tenderness. There is no guarding.   Bowel sounds audible x4   Musculoskeletal: Normal range of motion. He exhibits no edema.   Lymphadenopathy:     He has no cervical adenopathy.   Neurological: He is alert and oriented to person, place, and time.   Skin: Skin is warm, dry and intact. No rash noted.   Vitals reviewed.        Lab Review:   12/05/2018 (reviewed with patient in office today):  · Hemoglobin A1c - 6.6%  · TSH - 2.730  · CBC - WBC 5.8, RBC 5.59, hemoglobin 15.9, hematocrit 46.4%, platelets 284, acceptable indices and differential  · PSA - <0.1  · FLP - total cholesterol 151, triglycerides 139, HDL-C 47, LDL-C 76  · CMP - glucose 118, BUN 18, creatinine 1.37, GFR 48, sodium 143, potassium 4.2, chloride 103, CO2 - 20, calcium 9.7, protein 7.2, albumin 4.6, globulin 2.6, bilirubin 0.5, alk phos 54, AST 19, ALT 10    Lab Results   Component Value Date    GLUCOSE 107 (H) 07/15/2015    BUN 28 (H) 07/15/2015    CREATININE 1.4 (H) 07/15/2015    CO2 29 07/15/2015    CALCIUM 8.8 07/15/2015    ALBUMIN 4.0 07/12/2015    AST 17 07/12/2015    ALT 16 07/12/2015       Lab Results   Component Value Date    WBC 10.47 07/14/2015    HGB 12.0 (L) 07/14/2015    HCT 37.3 (L) 07/14/2015    MCV 86.9 07/14/2015     07/14/2015       Lab Results   Component Value Date    HGBA1C 6.7 (H) 07/12/2015       Lab Results   Component Value Date    TSH 3.548  07/12/2015       Lab Results   Component Value Date     (H) 07/12/2015           Assessment:   Overall continued acceptable course with no interim cardiopulmonary complaints with good functional status. We will defer additional diagnostic or therapeutic intervention from a cardiac perspective at this time.       Diagnosis Plan   1. Chronic atrial fibrillation (CMS/HCC)  Continue current treatment   2. Syncope, unspecified syncope type  No recurrence   3. Sick sinus syndrome (CMS/HCC)  Nominal acceptable pacemaker interrogation   4. Type 2 diabetes mellitus with complication, without long-term current use of insulin (CMS/HCC)  Acceptable control; continue current treatment   5. Dyslipidemia  Continue current treatment   6. Essential hypertension  No recurrent angina pectoris or CHF on current activity schedule; continue current treatment     7. Stage 3 chronic kidney disease (CMS/HCC)  Continue current treatment and followup and monitoring with Dr. Fried          Plan:         1. Patient to continue current medications and close follow up with the above providers.  2. Acceptable device interrogation today.  3. Tentative cardiology follow up in July 2019, or patient may return sooner PRN.    Transcribed by Mirna Damon for Dr. Daniel Slaughter at 2:30 PM on 01/02/2019    IDaniel MD, [facility], personally performed the services described in this documentation as scribed by the above named individual in my presence, and it is both accurate and complete. At 2:40 PM on 01/02/2019

## 2019-03-06 ENCOUNTER — CLINICAL SUPPORT NO REQUIREMENTS (OUTPATIENT)
Dept: CARDIOLOGY | Facility: CLINIC | Age: 84
End: 2019-03-06

## 2019-03-06 DIAGNOSIS — I48.20 CHRONIC ATRIAL FIBRILLATION (HCC): ICD-10-CM

## 2019-03-06 PROCEDURE — 93296 REM INTERROG EVL PM/IDS: CPT | Performed by: INTERNAL MEDICINE

## 2019-03-06 PROCEDURE — 93294 REM INTERROG EVL PM/LDLS PM: CPT | Performed by: INTERNAL MEDICINE

## 2019-03-18 RX ORDER — NEBIVOLOL 5 MG/1
5 TABLET ORAL DAILY
Qty: 90 TABLET | Refills: 3 | Status: SHIPPED | OUTPATIENT
Start: 2019-03-18 | End: 2019-07-15

## 2019-05-13 RX ORDER — RIVAROXABAN 15 MG/1
TABLET, FILM COATED ORAL
Qty: 30 TABLET | Refills: 11 | Status: SHIPPED | OUTPATIENT
Start: 2019-05-13 | End: 2020-05-18

## 2019-06-06 ENCOUNTER — CLINICAL SUPPORT NO REQUIREMENTS (OUTPATIENT)
Dept: CARDIOLOGY | Facility: CLINIC | Age: 84
End: 2019-06-06

## 2019-06-06 DIAGNOSIS — I48.91 ATRIAL FIBRILLATION, UNSPECIFIED TYPE (HCC): ICD-10-CM

## 2019-06-06 PROCEDURE — 93296 REM INTERROG EVL PM/IDS: CPT | Performed by: INTERNAL MEDICINE

## 2019-06-06 PROCEDURE — 93294 REM INTERROG EVL PM/LDLS PM: CPT | Performed by: INTERNAL MEDICINE

## 2019-07-12 ENCOUNTER — OFFICE VISIT (OUTPATIENT)
Dept: CARDIOLOGY | Facility: CLINIC | Age: 84
End: 2019-07-12

## 2019-07-12 VITALS
HEIGHT: 70 IN | SYSTOLIC BLOOD PRESSURE: 138 MMHG | HEART RATE: 88 BPM | BODY MASS INDEX: 26.74 KG/M2 | DIASTOLIC BLOOD PRESSURE: 73 MMHG | WEIGHT: 186.8 LBS

## 2019-07-12 DIAGNOSIS — I45.10 RIGHT BUNDLE BRANCH BLOCK: Primary | ICD-10-CM

## 2019-07-12 DIAGNOSIS — I48.0 PAROXYSMAL ATRIAL FIBRILLATION (HCC): Primary | ICD-10-CM

## 2019-07-12 DIAGNOSIS — I49.5 SICK SINUS SYNDROME (HCC): ICD-10-CM

## 2019-07-12 DIAGNOSIS — G47.33 OSA ON CPAP: ICD-10-CM

## 2019-07-12 DIAGNOSIS — Z99.89 OSA ON CPAP: ICD-10-CM

## 2019-07-12 PROCEDURE — 99214 OFFICE O/P EST MOD 30 MIN: CPT | Performed by: INTERNAL MEDICINE

## 2019-07-12 NOTE — PROGRESS NOTES
Subjective:     Encounter Date:07/12/2019    Patient ID: Micha Crockett is an 84 y.o.  white male, a , from [place], [place].       PHYSICIAN: Anthony Fried MD  PREVIOUS CARDIOLOGIST: Gypsy Gibbs MD  ELECTROPHYSIOLOGIST: ROSIBEL  ORTHOPEDIST: Vitaly Omer MD  SLEEP MD:  Gypsy Gibbs MD    Chief Complaint:   Chief Complaint   Patient presents with   • Atrial Fibrillation     Problem List:  1. Remote paroxysmal atrial fibrillation with now chronic sustained atrial fibrillation and progressive sick sinus syndrome:  a. Diagnosed in [facility], in April 2014.   b. CHADS-VASc score of 4 with anticoagulation on Xarelto.  c. Successful cardioversion with restoration of sinus rhythm with advanced trifascicular block with AR interval of 292 msec in the setting of CRBB/LAHB, on 04/29/2014, by Dr. Slaughter.   d. Return of atrial fibrillation with Holter monitor.  e. Holter monitor for 48 hours with a minimum heart rate of 42 beats per minute and max of 138 beats per minute, with ventricular ectopy less than 1% of the time and supraventricular ectopy 2% of the time, 03/27/2015.  f. Echocardiogram showing estimated EF of 61% with mild-to-moderate LVH and left atrium slightly dilated, and right ventricle slightly dilated, and moderate aortic cuff sclerosis, and mild aortic regurgitation with mild mitral regurgitation, and mild-to-moderate tricuspid regurgitation.  g. Implantation of [employer] single-chamber permanent pacemaker on 07/13/2015 by Dr. Cano, with acceptable interrogation and no reprogramming, April 2017, with subsequent acceptable remote check, August 2017, November 2018, with acceptable interrogation, January 2019, July 2019.  2. Syncopal episodes secondary to bradycardia.   3. Hypertension.   4. Dyslipidemia.   5. Diabetes mellitus type 2, hemoglobin  A1c 6.7%, July 2015; 6.6%, December 2018  6. Chronic right bundle branch block.   7. Degenerative joint  disease.   8. Ventral hernia.   9. Chronic kidney disease.   10. Obstructive sleep apnea with the use of CPAP.   11. Surgical history:  a. Appendectomy.   b. Pilonidal cyst.  c. 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  d. Prostatectomy.   12. Recent progressive dysphoria, weakness, exercise intolerance, and fatigue, off CPAP therapy, with recently completed polysomnogram and laboratory studies - data deficit, July 2019       No Known Allergies      Current Outpatient Medications:   •  aspirin 81 MG tablet, Take 81 mg by mouth Daily., Disp: , Rfl:   •  benazepril (LOTENSIN) 20 MG tablet, Take 20 mg by mouth Daily., Disp: , Rfl: 1  •  betamethasone valerate (VALISONE) 0.1 % cream, APPLY TO AFFECTED AREA 3 TIMES A DAY AND AS NEEDED FOR ITCHING, Disp: , Rfl: 0  •  erythromycin (ROMYCIN) 5 MG/GM ophthalmic ointment, Administer  to both eyes At Night As Needed., Disp: , Rfl:   •  fenofibrate 160 MG tablet, Take 160 mg by mouth Daily., Disp: , Rfl: 0  •  hydrochlorothiazide (HYDRODIURIL) 25 MG tablet, Take 12.5 mg by mouth Daily., Disp: , Rfl: 1  •  metFORMIN XR (GLUCOPHAGE-XR) 500 MG 24 hr tablet, Take 500 mg by mouth Daily., Disp: , Rfl: 1  •  nebivolol (BYSTOLIC) 5 MG tablet, Take 1 tablet by mouth Daily., Disp: 90 tablet, Rfl: 3  •  pravastatin (PRAVACHOL) 20 MG tablet, Take 20 mg by mouth Daily., Disp: , Rfl: 1  •  XARELTO 15 MG tablet, TAKE 1 TABLET BY MOUTH EVERY DAY WITH DINNER, Disp: 30 tablet, Rfl: 11    HISTORY OF PRESENT ILLNESS: Patient returns for scheduled 6-month followup. He denies any chest pain, palpitations, dizziness, presyncope, syncope, edema, or shortness of breath.  He has complaints of extreme fatigue to the point where he does not want to do any activities during the day.  He states that this is unlike him, and he has experienced this over the past 3 months.  He feels that some of his fatigue may be related to his medications; he read about the side effects of his medications and  "found that Bystolic could cause some of his symptoms, so he stopped the Bystolic over the past 2 days.  He has noticed slightly more energy but still feels extreme fatigue.  He has been on Bystolic for 6-12 months.  Also of note, the patient has not been using his CPAP for the past 3 months.  He states that his machine broke, and he had a sleep study earlier this week.  He has severe insomnia and states that he goes several days being unable to sleep.  Then, after 3-4 days, he will have one night where he can actually sleep.  He is supposed to follow up with his sleep physician soon, but they told him it takes 2-3 weeks to obtain his sleep study results. He has had lab work and a carotid duplex with his physician; these were acceptable as far as he knows - data deficit. He denies melena or anemia.  His wife who accompanies him today is concerned because he is \"definitely not himself.\"  She feels that the patient is fixated on the medication being the problem, but she is unsure that this is the sole reason for his fatigue.  Patient otherwise denies chest pain, shortness of breath, PND, edema, palpitations, syncope or presyncope at this time on limited activity.      Review of Systems   Constitution: Positive for malaise/fatigue.      All other systems reviewed and otherwise negative.    Procedures       Objective:       Vitals:    07/12/19 1037 07/12/19 1045   BP: 136/85 138/73   BP Location: Left arm Left arm   Patient Position: Sitting Standing   Pulse: 101 88   Weight: 84.7 kg (186 lb 12.8 oz)    Height: 177.8 cm (70\")    Recheck blood pressure right arm sitting was 124/68, heart rate 100  Body mass index is 26.8 kg/m².   Last weight:  187 lbs.    Physical Exam   Constitutional: He is oriented to person, place, and time. He appears well-developed and well-nourished.   Neck: No JVD present. Carotid bruit is not present. No thyromegaly present.   Cardiovascular: S1 normal and S2 normal. An irregularly irregular " rhythm present. Tachycardia present. Exam reveals no gallop, no S3 and no friction rub.   Murmur heard.   Medium-pitched early systolic murmur is present with a grade of 2/6 at the lower left sternal border.  Pulses:       Carotid pulses are 1+ on the right side, and 1+ on the left side.       Radial pulses are 1+ on the right side, and 1+ on the left side.        Femoral pulses are 1+ on the right side, and 1+ on the left side.       Popliteal pulses are 1+ on the right side, and 1+ on the left side.        Dorsalis pedis pulses are 1+ on the right side, and 1+ on the left side.        Posterior tibial pulses are 1+ on the right side, and 1+ on the left side.   Pulmonary/Chest: Effort normal. He has decreased breath sounds. He has no wheezes. He has no rhonchi. He has no rales.   Left precordial pacemaker site is nominal   Abdominal: Soft. He exhibits no mass. There is no hepatosplenomegaly. There is no tenderness. There is no guarding.   Bowel sounds audible x4   Musculoskeletal: Normal range of motion. He exhibits no edema.   Lymphadenopathy:     He has no cervical adenopathy.   Neurological: He is alert and oriented to person, place, and time.   Skin: Skin is warm, dry and intact. No rash noted.   Vitals reviewed.        Lab Review: No recent laboratory studies available for review today    Lab Results   Component Value Date    GLUCOSE 107 (H) 07/15/2015    BUN 28 (H) 07/15/2015    CREATININE 1.4 (H) 07/15/2015    CO2 29 07/15/2015    CALCIUM 8.8 07/15/2015    ALBUMIN 4.0 07/12/2015    AST 17 07/12/2015    ALT 16 07/12/2015       Lab Results   Component Value Date    WBC 10.47 07/14/2015    HGB 12.0 (L) 07/14/2015    HCT 37.3 (L) 07/14/2015    MCV 86.9 07/14/2015     07/14/2015       Lab Results   Component Value Date    HGBA1C 6.7 (H) 07/12/2015       Lab Results   Component Value Date    TSH 3.548 07/12/2015       Lab Results   Component Value Date     (H) 07/12/2015     [employer]  single-chamber pacemaker interrogation 7/12/2019: 100% ventricular paced, 17 BHR events (4-6 beat runs) with no reprogramming      Assessment:   Patient with extreme fatigue with possible intolerance to beta-blockers.  Patient had a recent carotid duplex and laboratory testing with his physician; we encouraged the patient to send us those results when available.  We will refer him to electrophysiology for possible AV node ablation and obtain a new echocardiogram.  We will have him stop his Bystolic for now and encouraged the patient to follow-up with his sleep physician and get a new CPAP machine for his CANDY.  Patient's pacemaker interrogation was acceptable in office today.  We feel that his fatigue is multifactorial.  We are very perplexed by his symptoms and hopefully will be allowed to review his laboratory studies.  I do not feel he is having angina pectoris or CHF.  If his echocardiogram shows significant left ventricular thickening, he may need to consider workup for cardiac amyloidosis.     Diagnosis Plan   1. Paroxysmal atrial fibrillation (CMS/HCC)   Stop Bystolic, OP EP consultation for possible AV node ablation, echocardiogram   2. Sick sinus syndrome (CMS/HCC)  Stop Bystolic, OP EP consultation for possible AV node ablation, echocardiogram   3. CANDY on CPAP   Encouraged patient to get new CPAP machine for his CANDY          Plan:         1. Patient to continue current medications and close follow up with the above providers other than to discontinue his  Bystolic.  2. Tentative cardiology follow up in 6 weeks, or patient may return sooner PRN.  3. Refer for outpatient EP consultation for possible AV node ablation  4. Echocardiogram ordered  5. Encouraged patient to get a new CPAP machine  6. Will write to patient if and when we receive results of recent polysomnogram, carotid duplex, and laboratory studies.    Scribed for Daniel Slaughter MD by Kathleen Mccray, UGO. 7/12/2019  11:33 AM    Daniel PINEDA  MD Sukhjinder, FAC, personally performed the services described in this documentation as scribed by the above named individual in my presence, and it is both accurate and complete. At 11:37 AM on 07/12/2019

## 2019-07-15 ENCOUNTER — TELEPHONE (OUTPATIENT)
Dept: CARDIOLOGY | Facility: CLINIC | Age: 84
End: 2019-07-15

## 2019-07-18 ENCOUNTER — TELEPHONE (OUTPATIENT)
Dept: CARDIOLOGY | Facility: CLINIC | Age: 84
End: 2019-07-18

## 2019-07-18 DIAGNOSIS — I48.0 PAROXYSMAL ATRIAL FIBRILLATION (HCC): Primary | ICD-10-CM

## 2019-07-18 RX ORDER — CARVEDILOL 6.25 MG/1
6.25 TABLET ORAL 2 TIMES DAILY
Qty: 180 TABLET | Refills: 3 | Status: SHIPPED | OUTPATIENT
Start: 2019-07-18 | End: 2019-08-30

## 2019-07-18 NOTE — TELEPHONE ENCOUNTER
Pt's wife called regarding letter about ECHO results. She stated that they are interested in trying to carvedilol to replace his Bystolic.    Pt currently is fatigued. Not wanting to get up and do anything. Pt denies chest pain, SOB, swelling.     What would you like me to order.

## 2019-07-18 NOTE — TELEPHONE ENCOUNTER
Called pt's wife and told her that I would order carvedilol 6.25mg twice a day and pt needs CBC drawn.    Faxing the CBC order to Dr Fried's office for the pt to get his labs in their office per pt request.     Pt's wife agreeable to plan and verbalizes understanding.

## 2019-07-30 ENCOUNTER — TELEPHONE (OUTPATIENT)
Dept: CARDIOLOGY | Facility: CLINIC | Age: 84
End: 2019-07-30

## 2019-07-30 NOTE — TELEPHONE ENCOUNTER
Pt dropped off sleep study and picked up Xarelto RX. Pt signed for RX and copy placed in scan basket.

## 2019-07-30 NOTE — TELEPHONE ENCOUNTER
Pt's wife called and stated that pt was dropping off sleep study results today. Pt's wife also requested a paper RX of Xarelto 15mg daily due to switching to [facility] who requested a paper RX and coupon to try and fill Xarelto at a cheaper price that pt currently paying.

## 2019-07-31 ENCOUNTER — HOSPITAL ENCOUNTER (OUTPATIENT)
Dept: PHYSICAL THERAPY | Age: 84
Discharge: HOME | End: 2019-07-31
Payer: MEDICARE

## 2019-07-31 PROCEDURE — 97162 PT EVAL MOD COMPLEX 30 MIN: CPT

## 2019-07-31 PROCEDURE — 97110 THERAPEUTIC EXERCISES: CPT

## 2019-07-31 NOTE — PROGRESS NOTES
PT DAILY TREATMENT NOTE 10-18    Patient Name: Darius Rowe  Date:2019  : 1935  [x]  Patient  Verified  Payor: Candie Smith / Plan: Isabel Sweetix / Product Type: Managed Care Medicare /    In time:200  Out time:240  Total Treatment Time (min): 40  Visit #: 1 of 16    Medicare/BCBS Only   Total Timed Codes (min):  23 1:1 Treatment Time:  40       Treatment Area: Unspecified abnormalities of gait and mobility [R26.9]  History of falling [Z91.81]    Physical Therapy Evaluation  Neurologic    SUBJECTIVE      Any medication changes, allergies to medications, adverse drug reactions, diagnosis change, or new procedure performed?: [x] No    [] Yes (see summary sheet for update)    Subjective functional status/changes:     PLOF: (I) Functional ADLs, (I) Self-Care ADLs, Work Full-Time, Falls History, (I) Driving  Current Functional Status: Difficulty with Community ADLs, Difficulty with Household ADLs  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 Patient denies dizziness but with history of heart valve replacement and quadruple coronary bypass with patient reporting noting onset of balance dysfunction afterwards. Patient reports use of walking stick with community ADLs with patient independent with self-care and household ADLs and currently working full-time in a sedentary occupation. Pain Intensity (0-10, VAS): Current 0, Worst 0, Best 0    Patient Goals: \"Better balance\"    OBJECTIVE EXAMINATION    BP: 122/74 mmHg    Gait: Decreased gait speed with decreased foot clearance bilaterally    Functional Tests:  1. 5x-sit-to-stand (low table, no UE assist): 17 sec  2. Rhomberg: EO 30 / EC (immediate loss of balance)  3. Rhomberg (Airex): EO (immediate loss of balance)  4.  TUG (10 feet, no AD): 18 sec    Strength (MMT):    Hip L (1-5) R (1-5)   Hip Flexion 5 5   Hip Ext NT NT   Hip ABD NT NT   Hip ER 5 5   Hip IR 5 5     Knee L (1-5) R (1-5)   Knee Flexion 5 5   Knee Extension 5 5   Ankle DF 4 3+     Sensation: Intact and symmetrical sensation to light touch bilaterally L2-S2    Reflexes: [] Not Tested   Left Right   Knee Jerk (L4) 0+ 0+   Ankle Jerk (SI) 0+ 0+     Neurological Screen: (-) Left/Right Ankle Clonus    Balance/ Equilibrium:              Left            Right  Tracks Across Midline Normal  Normal   Reaches Across Midline Normal Normal         Sitting Balance: Static:  [x] Good    [] Fair    [] Poor     Dynamic:   [x] Good    [] Fair    [] Poor        Standing Balance: Static:   [] Good    [] Fair    [] Poor     Dynamic:   [] Good    [] Fair    [] Poor      Behavior: [x] Cooperative    [] Impulsive    [] Agitated    [] Perseverative    [] Confused   Oriented x:3    Cognition:    [x] Multiple Commands       Other:       Impaired Judgement: [] Y    [x] N      Impaired Vision:  [] Y    [x] N      Safety Awareness Deficits  [] Y    [x] N      Impaired Hearing  [x] Y    [] N      Able to Express Needs [x] Y    [] N    OBJECTIVE    17 min [x]Eval                  []Re-Eval     23 min Therapeutic Exercise:  [x] See flow sheet : Patient educated regarding completion of prescribed HEP and provided with written HEP instructions, Patient educated regarding diagnosis and PT POC   Rationale: increase ROM and increase strength to improve the patients ability to improve ease with functional ADLs     With   [] TE   [] TA   [] neuro   [] other: Patient Education: [x] Review HEP    [] Progressed/Changed HEP based on:   [] positioning   [] body mechanics   [] transfers   [] heat/ice application    [] other:      Other Objective/Functional Measures: See above objective. Pain Level (0-10 scale) post treatment: 0    ASSESSMENT/Changes in Function: Patient presents with balance dysfunction with imbalance noted most prominently with alterations of visual input and surface (I.e. Stable v. Unstable). Patient as well with cardiac history with inability to definitively rule out contribution to subjective report of fall history. Objectively patient noted with weakness of bilateral ankle dorsiflexors with patient ambulating with a reduced foot clearance and shortened stride length. To emphasize promotion of LE strength and stability with improvement in static and dynamic stability on even and uneven grounds to reduce falls risk and improve safety and independence with community ADLs. Patient will continue to benefit from skilled PT services to modify and progress therapeutic interventions, address functional mobility deficits, address ROM deficits, address strength deficits, analyze and address soft tissue restrictions, analyze and cue movement patterns, analyze and modify body mechanics/ergonomics, assess and modify postural abnormalities and address imbalance/dizziness to attain remaining goals. [x]  See Plan of Care  []  See progress note/recertification  []  See Discharge Summary         Progress towards goals / Updated goals:    Short Term Goals: To be accomplished in 3-4 weeks:  1. Patient will subjectively report full compliance with prescribed HEP. Eval: HEP provided  2. Patient will demonstrate left/right ankle DF MMT >/= 4+/5 to improve safety with community ambulation. Eval: Left Ankle DF MMT = 4/5, Right Ankle DF MMT = 3+/5  3. Patient will demonstrate rhomberg (EC) >/= 20 seconds to improve safety with ambulation at nighttime. Eval: Rhomberg (EC) = (immediate loss of balance)    Long Term Goals: To be accomplished in 7-8 weeks:  1.  Patient will demonstrate a significant functional improvement as demonstrated by a score of >/= 69 on FOTO. Eval: FOTO = 61  2. Patient will demonstrate TUG (10 feet, no AD) </= 13 seconds to demonstrate a reduced falls risk. Eval: TUG (10 feet, no AD) = 18 sec  3. Patient will demonstrate rhomberg (EO, airex) >/= 20 seconds to demonstrate improved safety with ambulation on uneven surfaces.   Eval: Rhomberg (EO, Airex) = Immediate loss of balance    PLAN  [x]  Upgrade activities as tolerated     []  Continue plan of care  []  Update interventions per flow sheet       []  Discharge due to:_  []  Other:_      Andrea Connolly, PT 7/31/2019  12:18 PM    Future Appointments   Date Time Provider Valerio Banerjee   7/31/2019  2:00 PM Kalyani Summers, PT [facility]

## 2019-07-31 NOTE — PROGRESS NOTES
[facility]              [address]        [place], [address] [place]   [phone] fax    Plan of Care/ Statement of Necessity for Physical Therapy Services    Patient name: Car Hernandez Start of Care: 2019   Referral source: Elena Espinal MD : 1935    Medical Diagnosis: Unspecified abnormalities of gait and mobility [R26.9]  History of falling [Z91.81]  Payor: Nicole Garner / Plan: Myriam Wilder / Product Type: [employer] Medicare /  Onset Date:Chronic, Worsening over last year    Treatment Diagnosis: Gait Instability   Prior Hospitalization: see medical history Provider#: [id number]   Medications: Verified on Patient summary List    Comorbidities: Heart Disease, Coronary Bypass (), Replacement of Heart Valve (), Pacemaker   Prior Level of Function: (I) Functional ADLs, (I) Self-Care ADLs, Work Full-Time, Falls History, (I) Driving      The Plan of Care and following information is based on the information from the initial evaluation. Assessment:    Patient presents with balance dysfunction with imbalance noted most prominently with alterations of visual input and surface (I.e. Stable v. Unstable). Patient as well with cardiac history with inability to definitively rule out contribution to subjective report of fall history. Objectively patient noted with weakness of bilateral ankle dorsiflexors with patient ambulating with a reduced foot clearance and shortened stride length. To emphasize promotion of LE strength and stability with improvement in static and dynamic stability on even and uneven grounds to reduce falls risk and improve safety and independence with community ADLs.      Patient will continue to benefit from skilled PT services to modify and progress therapeutic interventions, address functional mobility deficits, address ROM deficits, address strength deficits, analyze and address soft tissue restrictions, analyze and cue movement patterns, analyze and modify body mechanics/ergonomics, assess and modify postural abnormalities and address imbalance/dizziness to attain remaining goals. Key Information:    Gait: Decreased gait speed with decreased foot clearance bilaterally     Functional Tests:  1. 5x-sit-to-stand (low table, no UE assist): 17 sec  2. Rhomberg: EO 30 / EC (immediate loss of balance)  3. Rhomberg (Airex): EO (immediate loss of balance)  4. TUG (10 feet, no AD): 18 sec     Strength (MMT):  Hip L (1-5) R (1-5)   Hip Flexion 5 5   Hip Ext NT NT   Hip ABD NT NT   Hip ER 5 5   Hip IR 5 5      Knee L (1-5) R (1-5)   Knee Flexion 5 5   Knee Extension 5 5   Ankle DF 4 3+      Sensation: Intact and symmetrical sensation to light touch bilaterally L2-S2     Evaluation Complexity History MEDIUM  Complexity : 1-2 comorbidities / personal factors will impact the outcome/ POC ; Examination MEDIUM Complexity : 3 Standardized tests and measures addressing body structure, function, activity limitation and / or participation in recreation  ;Presentation MEDIUM Complexity : Evolving with changing characteristics  ; Clinical Decision Making MEDIUM Complexity : FOTO score of 26-74  Overall Complexity Rating: MEDIUM  Problem List: decrease ROM, decrease strength, impaired gait/ balance, decrease ADL/ functional abilitiies, decrease activity tolerance, decrease flexibility/ joint mobility and decrease transfer abilities   Treatment Plan may include any combination of the following: Therapeutic exercise, Therapeutic activities, Neuromuscular re-education, Physical agent/modality, Gait/balance training, Manual therapy, Patient education, Self Care training, Functional mobility training, Home safety training and Stair training  Patient / Family readiness to learn indicated by: asking questions, trying to perform skills and interest  Persons(s) to be included in education: patient (P)  Barriers to Learning/Limitations: None  Patient Goal (s): Better balance  Patient Self Reported Health Status: good  Rehabilitation Potential: good    Short Term Goals: To be accomplished in 3-4 weeks:  1. Patient will subjectively report full compliance with prescribed HEP. Eval: HEP provided  2. Patient will demonstrate left/right ankle DF MMT >/= 4+/5 to improve safety with community ambulation. Eval: Left Ankle DF MMT = 4/5, Right Ankle DF MMT = 3+/5  3. Patient will demonstrate rhomberg (EC) >/= 20 seconds to improve safety with ambulation at nighttime. Eval: Rhomberg (EC) = (immediate loss of balance)    Long Term Goals: To be accomplished in 7-8 weeks:  1. Patient will demonstrate a significant functional improvement as demonstrated by a score of >/= 69 on FOTO. Eval: FOTO = 61  2. Patient will demonstrate TUG (10 feet, no AD) </= 13 seconds to demonstrate a reduced falls risk. Eval: TUG (10 feet, no AD) = 18 sec  3. Patient will demonstrate rhomberg (EO, airex) >/= 20 seconds to demonstrate improved safety with ambulation on uneven surfaces. Eval: Rhomberg (EO, Airex) = Immediate loss of balance    Frequency / Duration: Patient to be seen 2 times per week for 8 weeks. Patient/ Caregiver education and instruction: Diagnosis, prognosis, self care, activity modification and exercises   [x]  Plan of care has been reviewed with PTA      Certification Period: 7/31/2019 - 9/29/2019  Suzanne Mosqueda, PT 7/31/2019 12:19 PM  ________________________________________________________________________    I certify that the above Therapy Services are being furnished while the patient is under my care. I agree with the treatment plan and certify that this therapy is necessary.     Physician's Signature:____________Date:_________TIME:________    [employer], Date and Time must be completed for valid certification **  Please sign and return to [place] [address] [facility] [address] [facility], [address] [place]   [phone] fax

## 2019-08-06 ENCOUNTER — HOSPITAL ENCOUNTER (OUTPATIENT)
Dept: PHYSICAL THERAPY | Age: 84
Discharge: HOME | End: 2019-08-06
Payer: MEDICARE

## 2019-08-06 PROCEDURE — 97110 THERAPEUTIC EXERCISES: CPT

## 2019-08-06 PROCEDURE — 97112 NEUROMUSCULAR REEDUCATION: CPT

## 2019-08-06 NOTE — PROGRESS NOTES
PT DAILY TREATMENT NOTE 10-18    Patient Name: Joseline Jaffe  Date:2019  : 1935  [x]  Patient  Verified  Payor: Levi Fisher / Plan: Anna Lewis / Product Type: Managed Care Medicare /    In time:5:30  Out time:6:15  Total Treatment Time (min): 45  Visit #: 2 of 16    Medicare/BCBS Only   Total Timed Codes (min):  45 1:1 Treatment Time:  40       Treatment Area: Unspecified abnormalities of gait and mobility [R26.9]  History of falling [Z91.81]    SUBJECTIVE  Feeling of off balance Level (0-10 scale): 5-6  Any medication changes, allergies to medications, adverse drug reactions, diagnosis change, or new procedure performed?: [x] No    [] Yes (see summary sheet for update)  Subjective functional status/changes:   [] No changes reported  Pt reports that he does not walk around his house with his walking stick.      OBJECTIVE        Min Type Additional Details    [] Estim:  []Unatt       []IFC  []Premod                        []Other:  []w/ice   []w/heat  Position:  Location:    [] Estim: []Att    []TENS instruct  []NMES                    []Other:  []w/US   []w/ice   []w/heat  Position:  Location:    []  Traction: [] Cervical       []Lumbar                       [] Prone          []Supine                       []Intermittent   []Continuous Lbs:  [] before manual  [] after manual    []  Ultrasound: []Continuous   [] Pulsed                           []1MHz   []3MHz W/cm2:  Location:    []  Iontophoresis with dexamethasone         Location: [] Take home patch   [] In clinic    []  Ice     []  heat  []  Ice massage  []  Laser   []  Anodyne Position:  Location:    []  Laser with stim  []  Other:  Position:  Location:    []  Vasopneumatic Device Pressure:       [] lo [] med [] hi   Temperature: [] lo [] med [] hi   [] Skin assessment post-treatment:  []intact []redness- no adverse reaction    []redness  adverse reaction:       20 min Therapeutic Exercise:  [x] See flow sheet : Rationale: increase ROM and increase strength to improve the patients ability to increase tolerance to activities. 25 min Neuromuscular Re-education:  [x]  See flow sheet :dynmaic gait and balance exercises. Rationale: improve coordination, improve balance and increase proprioception  to improve the patients ability to increase ease with ADLs. With   [] TE   [] TA   [] neuro   [] other: Patient Education: [x] Review HEP    [] Progressed/Changed HEP based on:   [] positioning   [] body mechanics   [] transfers   [] heat/ice application    [] other:      Other Objective/Functional Measures: Pt reports performing HEP. Pain Level (0-10 scale) post treatment: 5-6    ASSESSMENT/Changes in Function: Initiated exercises per POC. Performed standing and balance exercises with CGA. Patient will continue to benefit from skilled PT services to modify and progress therapeutic interventions, address functional mobility deficits, address ROM deficits, address strength deficits and analyze and address soft tissue restrictions to attain remaining goals. []  See Plan of Care  []  See progress note/recertification  []  See Discharge Summary         Progress towards goals / Updated goals:  Short Term Goals: To be accomplished in 3-4 weeks:  1. Patient will subjectively report full compliance with prescribed HEP. Eval: HEP provided  Current: Goal met: Pt reports performing HEP. 8/6/19  2. Patient will demonstrate left/right ankle DF MMT >/= 4+/5 to improve safety with community ambulation. Eval: Left Ankle DF MMT = 4/5, Right Ankle DF MMT = 3+/5  3. Patient will demonstrate rhomberg (EC) >/= 20 seconds to improve safety with ambulation at nighttime. Eval: Rhomberg (EC) = (immediate loss of balance)     Long Term Goals: To be accomplished in 7-8 weeks:  1. Patient will demonstrate a significant functional improvement as demonstrated by a score of >/= 69 on FOTO. Eval: FOTO = 61  2.  Patient will demonstrate TUG (10 feet, no AD) </= 13 seconds to demonstrate a reduced falls risk. Eval: TUG (10 feet, no AD) = 18 sec  3. Patient will demonstrate rhomberg (EO, airex) >/= 20 seconds to demonstrate improved safety with ambulation on uneven surfaces.   Eval: Rhomberg (EO, Airex) = Immediate loss of balance    PLAN  []  Upgrade activities as tolerated     [x]  Continue plan of care  []  Update interventions per flow sheet       []  Discharge due to:_  []  Other:_      Amada Ovalle PTA 8/6/2019  5:33 PM    Future Appointments   Date Time Provider Valerio Lavonne   8/8/2019  5:00 PM Dena Reis, PT [facility] [address]   8/13/2019  5:30 PM Nilsa Andrade, PTA [facility] [address]   8/15/2019  5:30 PM Dena Reis, PT [facility] [address]   8/20/2019  5:30 PM Nilsa Andrade, PTA [facility] [address]   8/22/2019  5:30 PM Dena Reis, PT [facility] [address]   8/27/2019  5:30 PM David Clifford [facility] [address]   8/29/2019  5:30 PM Dena Reis, PT [facility] [address]   9/3/2019  5:30 PM Nilsa Andrade, PTA [facility] [address]   9/5/2019  5:30 PM Dena Reis PT [facility] [address]   9/10/2019  5:30 PM Nilsa Andrade, PTA [facility] [address]   9/12/2019  5:30 PM Dena Reis, PT [facility] [address]

## 2019-08-08 ENCOUNTER — HOSPITAL ENCOUNTER (OUTPATIENT)
Dept: PHYSICAL THERAPY | Age: 84
Discharge: HOME | End: 2019-08-08
Payer: MEDICARE

## 2019-08-08 PROCEDURE — 97112 NEUROMUSCULAR REEDUCATION: CPT

## 2019-08-08 PROCEDURE — 97110 THERAPEUTIC EXERCISES: CPT

## 2019-08-08 NOTE — PROGRESS NOTES
PT DAILY TREATMENT NOTE 10-18    Patient Name: Shan To  Date:2019  : 1935  [x]  Patient  Verified  Payor: Shyam Ochoa / Plan: Dee Perkins / Product Type: Managed Care Medicare /    In time:502  Out time:537  Total Treatment Time (min): 35  Visit #: 3 of 16    Medicare/BCBS Only   Total Timed Codes (min):  35 1:1 Treatment Time:  30       Treatment Area: Unspecified abnormalities of gait and mobility [R26.9]  History of falling [Z91.81]    SUBJECTIVE  Pain Level (0-10 scale): 5-6  Any medication changes, allergies to medications, adverse drug reactions, diagnosis change, or new procedure performed?: [x] No    [] Yes (see summary sheet for update)  Subjective functional status/changes:   [] No changes reported  Patient reports no adverse response after last treatment session. OBJECTIVE    15 min Therapeutic Exercise:  [x] See flow sheet : Emphasis placed on improving LE strength and AROM   Rationale: increase ROM and increase strength to improve the patients ability to improve ease with functional ADLs     20 min Neuromuscular Re-education:  [x]  See flow sheet : Emphasis placed on improving LE proprioceptive and kinesthetic awareness and static and dynamic balance   Rationale: increase ROM, increase strength, improve balance and increase proprioception  to improve the patients ability to improve safety with community ADLs          With   [] TE   [] TA   [] neuro   [] other: Patient Education: [x] Review HEP    [] Progressed/Changed HEP based on:   [] positioning   [] body mechanics   [] transfers   [] heat/ice application    [] other:      Other Objective/Functional Measures:   Rhomberg (EC) = (immediate loss of balance)     Pain Level (0-10 scale) post treatment: 6    ASSESSMENT/Changes in Function: Patient noted with uncompensated posterior weightshift with manual assistance secondarily required to correct.  With completion of exercises emphasis placed on maintenance of an erect posture. Patient will continue to benefit from skilled PT services to modify and progress therapeutic interventions, address functional mobility deficits, address ROM deficits, address strength deficits, analyze and address soft tissue restrictions, analyze and cue movement patterns and analyze and modify body mechanics/ergonomics to attain remaining goals. []  See Plan of Care  []  See progress note/recertification  []  See Discharge Summary         Progress towards goals / Updated goals:    Short Term Goals: To be accomplished in 3-4 weeks:  1. Patient will subjectively report full compliance with prescribed HEP. Eval: HEP provided  Current: Goal met: Pt reports performing HEP. 8/6/19  2. Patient will demonstrate left/right ankle DF MMT >/= 4+/5 to improve safety with community ambulation. Eval: Left Ankle DF MMT = 4/5, Right Ankle DF MMT = 3+/5  3. Patient will demonstrate rhomberg (EC) >/= 20 seconds to improve safety with ambulation at nighttime. Eval: Rhomberg (EC) = (immediate loss of balance)  Current: Remains, Rhomberg (EC) = (immediate loss of balance), 8/8/2019     Long Term Goals: To be accomplished in 7-8 weeks:  1. Patient will demonstrate a significant functional improvement as demonstrated by a score of >/= 69 on FOTO. Eval: FOTO = 61  2. Patient will demonstrate TUG (10 feet, no AD) </= 13 seconds to demonstrate a reduced falls risk. Eval: TUG (10 feet, no AD) = 18 sec  3. Patient will demonstrate rhomberg (EO, airex) >/= 20 seconds to demonstrate improved safety with ambulation on uneven surfaces.   Eval: Rhomberg (EO, Airex) = Immediate loss of balance    PLAN  []  Upgrade activities as tolerated     []  Continue plan of care  []  Update interventions per flow sheet       []  Discharge due to:_  []  Other:_      Jazz Montenegro, PT 8/8/2019  8:29 AM    Future Appointments   Date Time Provider Valerio Banerjee   8/8/2019  5:00 PM Mary Ordaz, PT [facility] 8/13/2019  5:30 PM Normand Felty, PTA [facility]   8/15/2019  5:30 PM Mishel Luu, PT [facility]   8/20/2019  5:30 PM Normand Felty, PTA [facility]   8/22/2019  5:30 PM Alverna Fariba, [address] [facility]   8/27/2019  5:30 PM Jadiel Oliveira [facility]   8/29/2019  5:30 PM Mishel Luu, PT [facility]   9/3/2019  5:30 PM Normand Felty, PTA [facility]   9/5/2019  5:30 PM Mishel Luu, PT [facility]   9/10/2019  5:30 PM Normand Felty, PTA [facility]   9/12/2019  5:30 PM Mishel Luu, PT [facility]

## 2019-08-13 ENCOUNTER — CONSULT (OUTPATIENT)
Dept: CARDIOLOGY | Facility: CLINIC | Age: 84
End: 2019-08-13

## 2019-08-13 ENCOUNTER — HOSPITAL ENCOUNTER (OUTPATIENT)
Dept: PHYSICAL THERAPY | Age: 84
Discharge: HOME | End: 2019-08-13
Payer: MEDICARE

## 2019-08-13 VITALS
OXYGEN SATURATION: 95 % | HEIGHT: 70 IN | WEIGHT: 188.2 LBS | HEART RATE: 80 BPM | DIASTOLIC BLOOD PRESSURE: 74 MMHG | BODY MASS INDEX: 26.94 KG/M2 | SYSTOLIC BLOOD PRESSURE: 136 MMHG

## 2019-08-13 DIAGNOSIS — I49.5 SICK SINUS SYNDROME (HCC): ICD-10-CM

## 2019-08-13 DIAGNOSIS — I48.19 PERSISTENT ATRIAL FIBRILLATION (HCC): Primary | ICD-10-CM

## 2019-08-13 PROCEDURE — 97110 THERAPEUTIC EXERCISES: CPT

## 2019-08-13 PROCEDURE — 93279 PRGRMG DEV EVAL PM/LDLS PM: CPT | Performed by: INTERNAL MEDICINE

## 2019-08-13 PROCEDURE — 97112 NEUROMUSCULAR REEDUCATION: CPT

## 2019-08-13 PROCEDURE — 99204 OFFICE O/P NEW MOD 45 MIN: CPT | Performed by: INTERNAL MEDICINE

## 2019-08-13 NOTE — PROGRESS NOTES
PT DAILY TREATMENT NOTE 10-18    Patient Name: Miles Purcell  Date:2019  : 1935  [x]  Patient  Verified  Payor: Shante Guzmán / Plan: Angelique Reardon / Product Type: Managed Care Medicare /    In time:5:24  Out time:6:08  Total Treatment Time (min): 44  Visit #: 4 of 16    Medicare/BCBS Only   Total Timed Codes (min):  44 1:1 Treatment Time:  44       Treatment Area: Unspecified abnormalities of gait and mobility [R26.9]  History of falling [Z91.81]    SUBJECTIVE  Pain Level (0-10 scale): 5  Any medication changes, allergies to medications, adverse drug reactions, diagnosis change, or new procedure performed?: [x] No    [] Yes (see summary sheet for update)  Subjective functional status/changes:   [] No changes reported  Pt reports that when he is walking, at times he feels he starts to walk backward and has to stop walking.      OBJECTIVE        Min Type Additional Details    [] Estim:  []Unatt       []IFC  []Premod                        []Other:  []w/ice   []w/heat  Position:  Location:    [] Estim: []Att    []TENS instruct  []NMES                    []Other:  []w/US   []w/ice   []w/heat  Position:  Location:    []  Traction: [] Cervical       []Lumbar                       [] Prone          []Supine                       []Intermittent   []Continuous Lbs:  [] before manual  [] after manual    []  Ultrasound: []Continuous   [] Pulsed                           []1MHz   []3MHz W/cm2:  Location:    []  Iontophoresis with dexamethasone         Location: [] Take home patch   [] In clinic    []  Ice     []  heat  []  Ice massage  []  Laser   []  Anodyne Position:  Location:    []  Laser with stim  []  Other:  Position:  Location:    []  Vasopneumatic Device Pressure:       [] lo [] med [] hi   Temperature: [] lo [] med [] hi   [] Skin assessment post-treatment:  []intact []redness- no adverse reaction    []redness  adverse reaction:           19 min Therapeutic Exercise:  [x] See flow sheet :   Rationale: increase ROM and increase strength to improve the patients ability to increase tolerance to activities.       25 min Neuromuscular Re-education:  [x]  See flow sheet :dynmaic gait and balance exercises. Rationale: improve coordination, improve balance and increase proprioception  to improve the patients ability to increase ease with ADLs. With   [] TE   [] TA   [] neuro   [] other: Patient Education: [x] Review HEP    [] Progressed/Changed HEP based on:   [] positioning   [] body mechanics   [] transfers   [] heat/ice application    [] other:      Other Objective/Functional Measures: Rhomberg (EC) = (immediate loss of balance),     Pain Level (0-10 scale) post treatment: 5    ASSESSMENT/Changes in Function: With rhomberg EC pt immediately has a posterior lean when he closes his eyes. Pt requires 1 HHA when performing dynamic gait. Pt has minimal movement of right ankle DF in seated. Patient will continue to benefit from skilled PT services to modify and progress therapeutic interventions, address functional mobility deficits, address ROM deficits, address strength deficits and analyze and address soft tissue restrictions to attain remaining goals. []  See Plan of Care  []  See progress note/recertification  []  See Discharge Summary         Progress towards goals / Updated goals:     Short Term Goals: To be accomplished in 3-4 weeks:  1. Patient will subjectively report full compliance with prescribed HEP. Eval: HEP provided  Current: Goal met: Pt reports performing HEP. 8/6/19  2. Patient will demonstrate left/right ankle DF MMT >/= 4+/5 to improve safety with community ambulation. Eval: Left Ankle DF MMT = 4/5, Right Ankle DF MMT = 3+/5  3. Patient will demonstrate rhomberg (EC) >/= 20 seconds to improve safety with ambulation at nighttime.   Eval: Rhomberg (EC) = (immediate loss of balance)  Current: Remains, Rhomberg (EC) = (immediate loss of balance), 8/13/2019     Long Term Goals: To be accomplished in 7-8 weeks:  1. Patient will demonstrate a significant functional improvement as demonstrated by a score of >/= 69 on FOTO. Eval: FOTO = 61  2. Patient will demonstrate TUG (10 feet, no AD) </= 13 seconds to demonstrate a reduced falls risk. Eval: TUG (10 feet, no AD) = 18 sec  3. Patient will demonstrate rhomberg (EO, airex) >/= 20 seconds to demonstrate improved safety with ambulation on uneven surfaces.   Eval: Rhomberg (EO, Airex) = Immediate loss of balance       PLAN  []  Upgrade activities as tolerated     [x]  Continue plan of care  []  Update interventions per flow sheet       []  Discharge due to:_  []  Other:_      Susan Benz PTA 8/13/2019  5:27 PM    Future Appointments   Date Time Provider Valerio Banerjee   8/13/2019  5:30 PM Neela Ferrell PTA [facility]   8/15/2019  5:30 PM Lordonna Scripture, PT [facility]   8/20/2019  5:30 PM Neelacarla Ferrell, PTA [facility]   8/22/2019  5:30 PM Lorette Scripture, PT [facility]   8/27/2019  5:30 PM Lois Has [facility]   8/29/2019  5:30 PM Lorette Scripture, PT [facility]   9/3/2019  5:30 PM Neela Mariaelena, PTA [facility]   9/5/2019  5:30 PM Lorette Scripture, PT [facility]   9/10/2019  5:30 PM Neela Mariaelena, PTA [facility]   9/12/2019  5:30 PM Lorette Scripture, PT [facility]

## 2019-08-13 NOTE — PROGRESS NOTES
Micha Crockett  1935  PCP: Anthony Fried MD    SUBJECTIVE:   Micha Crockett is a 84 y.o. male seen for a consultation visit regarding the following:     Chief Complaint:   Chief Complaint   Patient presents with   • Atrial Fibrillation          Consultation is requested by Daniel Slaughter MD for evaluation of Atrial Fibrillation        History:  This is an 84-year-old patient referred by Dr. Slaughter for further evaluation and management of atrial fibrillation complete heart block.  Patient had a single-chamber pacemaker placed in 2015.  He has been 100% dependent on his pacemaker.  With no underlying heart rhythms.  He was having increased symptoms of fatigue and weakness secondary to beta-blocker use.  Since stopping his beta-blocker his cardiac symptoms have greatly improved.  He still remains pacemaker dependent with complete heart block underneath.      Cardiac PMH: (Old records have been reviewed and summarized below)  1. Remote paroxysmal atrial fibrillation with now chronic sustained atrial fibrillation and progressive sick sinus syndrome:  a. Diagnosed in [facility], in April 2014.   b. CHADS-VASc score of 4 with anticoagulation on Xarelto.  c. Successful cardioversion with restoration of sinus rhythm with advanced trifascicular block with NM interval of 292 msec in the setting of CRBB/LAHB, on 04/29/2014, by Dr. Slaughter.   d. Return of atrial fibrillation with Holter monitor.  e. Holter monitor for 48 hours with a minimum heart rate of 42 beats per minute and max of 138 beats per minute, with ventricular ectopy less than 1% of the time and supraventricular ectopy 2% of the time, 03/27/2015.  f. Echocardiogram showing estimated EF of 61% with mild-to-moderate LVH and left atrium slightly dilated, and right ventricle slightly dilated, and moderate aortic cuff sclerosis, and mild aortic regurgitation with mild mitral regurgitation, and mild-to-moderate tricuspid  regurgitation.  g. Implantation of [employer] single-chamber permanent pacemaker on 07/13/2015 by Dr. Cano, with acceptable interrogation and no reprogramming, April 2017, with subsequent acceptable remote check, August 2017, November 2018, with acceptable interrogation, January 2019, July 2019.  2. Syncopal episodes secondary to bradycardia.   3. Hypertension.   4. Dyslipidemia.   5. Diabetes mellitus type 2, hemoglobin  A1c 6.7%, July 2015; 6.6%, December 2018  6. Chronic right bundle branch block.   7. Degenerative joint disease.   8. Ventral hernia.   9. Chronic kidney disease.   10. Obstructive sleep apnea with the use of CPAP.   11. Surgical history:  a. Appendectomy.   b. Pilonidal cyst.  c. 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  d. Prostatectomy.   12. Recent progressive dysphoria, weakness, exercise intolerance, and fatigue, off CPAP therapy, with recently completed polysomnogram and laboratory studies - data deficit, July 2019          Past Medical History, Past Surgical History, Family history, Social History, and Medications were all reviewed with the patient today and updated as necessary.       Current Outpatient Medications:   •  aspirin 81 MG tablet, Take 81 mg by mouth Daily., Disp: , Rfl:   •  benazepril (LOTENSIN) 20 MG tablet, Take 20 mg by mouth Daily., Disp: , Rfl: 1  •  carvedilol (COREG) 6.25 MG tablet, Take 1 tablet by mouth 2 (Two) Times a Day., Disp: 180 tablet, Rfl: 3  •  fenofibrate 160 MG tablet, Take 160 mg by mouth Daily., Disp: , Rfl: 0  •  hydrochlorothiazide (HYDRODIURIL) 25 MG tablet, Take 12.5 mg by mouth Daily., Disp: , Rfl: 1  •  metFORMIN XR (GLUCOPHAGE-XR) 500 MG 24 hr tablet, Take 500 mg by mouth Daily., Disp: , Rfl: 1  •  pravastatin (PRAVACHOL) 20 MG tablet, Take 20 mg by mouth Daily., Disp: , Rfl: 1  •  XARELTO 15 MG tablet, TAKE 1 TABLET BY MOUTH EVERY DAY WITH DINNER, Disp: 30 tablet, Rfl: 11    No Known Allergies      Past Medical History:    Diagnosis Date   • Atrial fibrillation (CMS/HCC) 10/11/2016    Recently diagnosed in [facility], in 2014.  CHADS-VASc score of 4 with anticoagulation on Xarelto. Successful cardioversion with restoration of sinus rhythm with advanced trifascicular block with DE interval of 292 msec in the setting of CRBB/LAHB, on 2014, by Dr. Slaughter.  Return of atrial fibrillation with Holter monitor. Holter monitor for 48 hours with a minimum heart rate of 42 beats per minute and max of 138 beats per minute, with ventricular ectopy less than 1% of the time and supraventricular ectopy 2% of the time, 2015. Echocardiogram showing estimated EF of 61% with mild-to-moderate LVH and left atrium slightly dilated, and right ventricle slightly dilated, and moderate aortic cuff sclerosis, and mild aortic regurgitation with mild mitral regurgitation, and mild-to-moderate tricuspid regurgitation. Implantation of  a [employer] single-chamber permanent pacemaker on 2015 by Dr. Cano.   • CKD (chronic kidney disease) 10/11/2016   • Diabetes mellitus, type 2 (CMS/HCC) 10/11/2016   • DJD (degenerative joint disease) 10/11/2016   • Dyslipidemia 10/11/2016   • Hypertension 10/11/2016   • CANDY on CPAP 10/11/2016   • Right bundle branch block 10/11/2016   • Syncopal episodes 10/11/2016    episodes secondary to bradycardia.    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   • APPENDECTOMY     • OTHER SURGICAL HISTORY      Pilonidal cyst   • PROSTATECTOMY       Family History   Problem Relation Age of Onset   • Stroke Mother    • No Known Problems Father      Social History     Tobacco Use   • Smoking status: Former Smoker     Packs/day: 1.00     Types: Cigarettes     Last attempt to quit: 1955     Years since quittin.6   • Smokeless tobacco: Never Used   Substance Use Topics   •  "Alcohol use: No       ROS:  General: no recent weight loss/gain, weakness or fatigue  Skin: no rashes, lumps, or other skin changes  HEENT: no dizziness, lightheadedness, or vision changes  Respiratory: no cough or hemoptysis  Cardiovascular: No palpitations, or tachycardia  Gastrointestinal: no black/tarry stools or diarrhea  Urinary: no change in frequency or urgency  Peripheral Vascular: no claudication or leg cramps  Musculoskeletal: no muscle or joint pain/stiffness  Psychiatric: no depression or excessive stress  Neurological: no sensory or motor loss, no syncope  Hematologic: no anemia, easy bruising or bleeding  Endocrine: no thyroid problems, nor heat or cold intolerance       PHYSICAL EXAM:   /74 (BP Location: Left arm, Patient Position: Sitting)   Pulse 80   Ht 177.8 cm (70\")   Wt 85.4 kg (188 lb 3.2 oz)   SpO2 95%   BMI 27.00 kg/m²      Wt Readings from Last 5 Encounters:   08/13/19 85.4 kg (188 lb 3.2 oz)   07/12/19 84.7 kg (186 lb 12.8 oz)   01/02/19 84.9 kg (187 lb 3.2 oz)   06/08/18 86.6 kg (191 lb)   11/29/17 87.9 kg (193 lb 12.8 oz)     BP Readings from Last 5 Encounters:   08/13/19 136/74   07/12/19 138/73   01/02/19 126/78   06/08/18 105/61   11/29/17 118/74       General-Well Nourished, Well developed  Eyes - PERRLA  Neck- supple, No mass  CV- regular rate and rhythm, no MRG  Lung- clear bilaterally  Abd- soft, +BS  Musc/skel - Norm strength and range of motion  Skin- warm and dry  Neuro - Alert & Oriented x 3, appropriate mood.    Medical problems and test results were reviewed with the patient today.     Results for orders placed or performed during the hospital encounter of 07/12/15   CBC and Differential   Result Value Ref Range    WBC 9.58 3.50 - 10.80 K/mcL    RBC 5.05 4.20 - 5.76 M/mcL    Hemoglobin 14.4 13.1 - 17.5 g/dL    Hematocrit 43.6 38.9 - 50.9 %    MCV 86.3 80.0 - 99.0 fL    MCH 28.5 27.0 - 31.0 pg    MCHC 33.0 32.0 - 36.0 g/dL    RDW-CV 14.7 (H) 11.3 - 14.5 %    " Platelets 269 150 - 450 K/mcL    Neutrophils Absolute 7.11 1.50 - 8.30 K/mcL    Lymphocytes Absolute 1.54 0.60 - 4.80 K/mcL    Monocytes Absolute 0.81 0.00 - 1.00 K/mcL    Eosinophils Absolute 0.07 (L) 0.10 - 0.30 K/mcL    Basophils Absolute 0.02 0.00 - 0.20 K/mcL    Neutrophil Rel % 74.2 (H) 41.0 - 71.0 %    Lymphocyte Rel % 16.1 (L) 24.0 - 44.0 %    Monocyte Rel % 8.5 0.0 - 12.0 %    Eosinophil Rel % 0.7 0.0 - 3.0 %    Basophil Rel % 0.2 0.0 - 1.0 %    Immature Granulocyte Rel % 0.3 0.0 - 0.6 %   Comprehensive metabolic panel   Result Value Ref Range    Glucose 133 (H) 70 - 100 mg/dL    BUN 21 9 - 23 mg/dL    Creatinine 1.6 (H) 0.6 - 1.3 mg/dL    Sodium 141 132 - 146 mmol/L    Potassium 4.1 3.5 - 5.5 mmol/L    Chloride 107 99 - 109 mmol/L    CO2 25 20 - 31 mmol/L    Calcium 9.5 8.7 - 10.4 mg/dL    Alkaline Phosphatase 56 25 - 100 Units/L    AST (SGOT) 17 0 - 33 Units/L    ALT (SGPT) 16 7 - 40 Units/L    Total Bilirubin 0.5 0.3 - 1.2 mg/dL    Total Protein 6.8 5.7 - 8.2 g/dL    Albumin 4.0 3.2 - 4.8 g/dL    eGFR 46 ml/min/1.732    Anion Gap 9 3 - 11 mmol/L   TSH   Result Value Ref Range    TSH 3.548 0.350 - 5.350 UIU/mL   Magnesium   Result Value Ref Range    Magnesium 2.2 1.3 - 2.7 mg/dL   Protime-INR   Result Value Ref Range    Protime 12.7 (H) 9.6 - 11.5 Seconds    INR 1.20    APTT   Result Value Ref Range    PTT 37 (H) 24 - 31 Seconds   BNP   Result Value Ref Range     (H) 0 - 100 pg/mL   Troponin   Result Value Ref Range    Troponin I 0.03 0.00 - 0.60 ng/mL   Basic metabolic panel   Result Value Ref Range    Glucose 124 (H) 70 - 100 mg/dL    BUN 21 9 - 23 mg/dL    Creatinine 1.5 (H) 0.6 - 1.3 mg/dL    Sodium 142 132 - 146 mmol/L    Potassium 4.5 3.5 - 5.5 mmol/L    Chloride 108 99 - 109 mmol/L    CO2 21 20 - 31 mmol/L    Calcium 9.2 8.7 - 10.4 mg/dL    eGFR Unable to Calculate ml/min/1.732    Anion Gap 13 (H) 3 - 11 mmol/L   Troponin   Result Value Ref Range    Troponin I 0.03 0.00 - 0.60 ng/mL    Hemoglobin A1c   Result Value Ref Range    Hemoglobin A1C 6.7 (H) 4.00 - 6.00 %    Mean Bld Glu Estim. 141 mg/dL   Basic metabolic panel   Result Value Ref Range    Glucose 321 (H) 70 - 100 mg/dL    BUN 38 (H) 9 - 23 mg/dL    Creatinine 2.6 (H) 0.6 - 1.3 mg/dL    Sodium 138 132 - 146 mmol/L    Potassium 3.4 (L) 3.5 - 5.5 mmol/L    Chloride 104 99 - 109 mmol/L    CO2 24 20 - 31 mmol/L    Calcium 9.0 8.7 - 10.4 mg/dL    eGFR Unable to Calculate ml/min/1.732    Anion Gap 10 3 - 11 mmol/L   Basic metabolic panel   Result Value Ref Range    Glucose 73 70 - 100 mg/dL    BUN 37 (H) 9 - 23 mg/dL    Creatinine 2.1 (H) 0.6 - 1.3 mg/dL    Sodium 138 132 - 146 mmol/L    Potassium 4.0 3.5 - 5.5 mmol/L    Chloride 107 99 - 109 mmol/L    CO2 24 20 - 31 mmol/L    Calcium 8.4 (L) 8.7 - 10.4 mg/dL    eGFR Unable to Calculate ml/min/1.732    Anion Gap 8 3 - 11 mmol/L   CBC (No diff)   Result Value Ref Range    WBC 10.47 3.50 - 10.80 K/mcL    RBC 4.29 4.20 - 5.76 M/mcL    Hemoglobin 12.0 (L) 13.1 - 17.5 g/dL    Hematocrit 37.3 (L) 38.9 - 50.9 %    MCV 86.9 80.0 - 99.0 fL    MCH 28.0 27.0 - 31.0 pg    MCHC 32.2 32.0 - 36.0 g/dL    RDW-CV 15.0 (H) 11.3 - 14.5 %    Platelets 213 150 - 450 K/mcL   Basic metabolic panel   Result Value Ref Range    Glucose 107 (H) 70 - 100 mg/dL    BUN 28 (H) 9 - 23 mg/dL    Creatinine 1.4 (H) 0.6 - 1.3 mg/dL    Sodium 141 132 - 146 mmol/L    Potassium 3.7 3.5 - 5.5 mmol/L    Chloride 104 99 - 109 mmol/L    CO2 29 20 - 31 mmol/L    Calcium 8.8 8.7 - 10.4 mg/dL    eGFR Unable to Calculate ml/min/1.732    Anion Gap 8 3 - 11 mmol/L   POCT Troponin, Rapid   Result Value Ref Range    Troponin I 0.01 0.00 - 0.60 ng/mL   POCT Glucose Fingerstick   Result Value Ref Range    Glucose 185 (H) 70 - 100 mg/dL   POCT Glucose Fingerstick   Result Value Ref Range    Glucose 215 (H) 70 - 100 mg/dL   POCT Glucose Fingerstick   Result Value Ref Range    Glucose 410 (C) 70 - 100 mg/dL   POCT Glucose Fingerstick   Result Value  Ref Range    Glucose 357 (H) 70 - 100 mg/dL   POCT Glucose Fingerstick   Result Value Ref Range    Glucose 131 (H) 70 - 100 mg/dL   POCT Glucose Fingerstick   Result Value Ref Range    Glucose 88 70 - 100 mg/dL   POCT Glucose Fingerstick   Result Value Ref Range    Glucose 86 70 - 100 mg/dL   POCT Glucose Fingerstick   Result Value Ref Range    Glucose 120 (H) 70 - 100 mg/dL   POCT Glucose Fingerstick   Result Value Ref Range    Glucose 77 70 - 100 mg/dL   POCT Glucose Fingerstick   Result Value Ref Range    Glucose 67 (L) 70 - 100 mg/dL   POCT Glucose Fingerstick   Result Value Ref Range    Glucose 86 70 - 100 mg/dL   POCT Glucose Fingerstick   Result Value Ref Range    Glucose 409 (C) 70 - 100 mg/dL   POCT Glucose Fingerstick   Result Value Ref Range    Glucose 80 70 - 100 mg/dL   POCT Glucose Fingerstick   Result Value Ref Range    Glucose 74 70 - 100 mg/dL   POCT Glucose Fingerstick   Result Value Ref Range    Glucose 106 (H) 70 - 100 mg/dL   POCT Glucose Fingerstick   Result Value Ref Range    Glucose 79 70 - 100 mg/dL   POCT Glucose Fingerstick   Result Value Ref Range    Glucose 102 (H) 70 - 100 mg/dL   POCT Glucose Fingerstick   Result Value Ref Range    Glucose 79 70 - 100 mg/dL         No results found for: CHOL, HDL, HDLC, LDL, LDLC, VLDL    EKG:  (EKG/Tracing has been independently visualized by me and summarized below)      ECG 12 Lead  Date/Time: 8/13/2019 9:22 AM  Performed by: Luc Lange MD  Authorized by: Luc Lange MD   Previous ECG: no previous ECG available  Rhythm: atrial fibrillation  Rate: normal  BPM: 80  Conduction: conduction normal  Pacing: ventricular pacing  Clinical impression: abnormal EKG            ASSESSMENT and PLAN  1.  Weakness and fatigue-much improved off his Bystolic therapy.  Made minor adjustments to his pacemaker to help with rate response.  2.  Permanent atrial fibrillation-rates are controlled and the patient is 100% paced.  No evidence of tachycardia.  3.   Pacemaker-stable VVIR function-change lower rate limit to 70 bpm and adjusted rate response.    Return if symptoms worsen or fail to improve.            Luc Lange M.D., [email or web address], F.H.R.S.  Cardiology/Electrophysiology  08/13/19  9:26 AM

## 2019-08-15 ENCOUNTER — HOSPITAL ENCOUNTER (OUTPATIENT)
Dept: PHYSICAL THERAPY | Age: 84
Discharge: HOME | End: 2019-08-15
Payer: MEDICARE

## 2019-08-15 PROCEDURE — 97112 NEUROMUSCULAR REEDUCATION: CPT

## 2019-08-15 PROCEDURE — 97110 THERAPEUTIC EXERCISES: CPT

## 2019-08-15 NOTE — PROGRESS NOTES
PT DAILY TREATMENT NOTE 10-18    Patient Name: Marilia Hernandez  Date:8/15/2019  : 1935  [x]  Patient  Verified  Payor: Evon Sierra / Plan: Kam Moran / Product Type: Managed Care Medicare /    In time:528  Out time:609  Total Treatment Time (min): 41  Visit #: 5 of 16    Medicare/BCBS Only   Total Timed Codes (min):  41 1:1 Treatment Time:  41       Treatment Area: Unspecified abnormalities of gait and mobility [R26.9]  History of falling [Z91.81]    SUBJECTIVE  Pain Level (0-10 scale): 2-3  Any medication changes, allergies to medications, adverse drug reactions, diagnosis change, or new procedure performed?: [x] No    [] Yes (see summary sheet for update)  Subjective functional status/changes:   [] No changes reported  Patient denies falls since SoC. OBJECTIVE    16 min Therapeutic Exercise:  [x] See flow sheet : Emphasis placed on improving LE strength and AROM   Rationale: increase ROM and increase strength to improve the patients ability to improve ease with functional ADLs     25 min Neuromuscular Re-education:  [x]  See flow sheet : Emphasis placed on improving LE proprioceptive and kinesthetic awareness and static and dynamic balance   Rationale: increase ROM, increase strength, improve balance and increase proprioception  to improve the patients ability to improve safety with community ADLs        With   [] TE   [] TA   [] neuro   [] other: Patient Education: [x] Review HEP    [] Progressed/Changed HEP based on:   [] positioning   [] body mechanics   [] transfers   [] heat/ice application    [] other:      Other Objective/Functional Measures:   Rhomberg (EC) = 3 sec     Pain Level (0-10 scale) post treatment: 5    ASSESSMENT/Changes in Function: Improved dynamic gait stability but continued poor safety awareness with poor foot clearance demonstrated with exercise completion thus resulting in fall risk.     Patient will continue to benefit from skilled PT services to address functional mobility deficits, address ROM deficits, address strength deficits, analyze and address soft tissue restrictions, analyze and cue movement patterns, analyze and modify body mechanics/ergonomics, assess and modify postural abnormalities, address imbalance/dizziness and instruct in home and community integration to attain remaining goals. []  See Plan of Care  []  See progress note/recertification  []  See Discharge Summary         Progress towards goals / Updated goals:    Short Term Goals: To be accomplished in 3-4 weeks:  1. Patient will subjectively report full compliance with prescribed HEP. Eval: HEP provided  Current: Goal met: Pt reports performing HEP. 8/6/19  2. Patient will demonstrate left/right ankle DF MMT >/= 4+/5 to improve safety with community ambulation. Eval: Left Ankle DF MMT = 4/5, Right Ankle DF MMT = 3+/5  3. Patient will demonstrate rhomberg (EC) >/= 20 seconds to improve safety with ambulation at nighttime. Eval: Rhomberg (EC) = (immediate loss of balance)  Current: Progressing, Rhomberg (EC) = 3 sec, 8/15/2019     Long Term Goals: To be accomplished in 7-8 weeks:  1. Patient will demonstrate a significant functional improvement as demonstrated by a score of >/= 69 on FOTO. Eval: FOTO = 61  2. Patient will demonstrate TUG (10 feet, no AD) </= 13 seconds to demonstrate a reduced falls risk. Eval: TUG (10 feet, no AD) = 18 sec  3. Patient will demonstrate rhomberg (EO, airex) >/= 20 seconds to demonstrate improved safety with ambulation on uneven surfaces.   Eval: Rhomberg (EO, Airex) = Immediate loss of balance  Current: Remains, Rhomberg (EO, Airex) = Immediate loss of balance, 8/15/2019    PLAN  [x]  Upgrade activities as tolerated     [x]  Continue plan of care  []  Update interventions per flow sheet       []  Discharge due to:_  []  Other:_      Arslan Trujillo, PT 8/15/2019  8:27 AM    Future Appointments   Date Time Provider Valerio Banerjee   8/15/2019 5:30 PM Roderick Ty, PT [facility]   8/20/2019  5:30 PM Blake Norman [facility]   8/22/2019  5:30 PM Roderick Ty, PT [facility]   8/27/2019  5:30 PM Иван Ronquillo [facility]   8/29/2019  5:30 PM Roderick Ty, PT [facility]   9/3/2019  5:30 PM Jesus Stock, PTA [facility]   9/5/2019  5:30 PM Roderick Ty, PT [facility]   9/10/2019  5:30 PM Jesus Stock, PTA [facility]   9/12/2019  5:30 PM Roderick Ty, PT [facility]

## 2019-08-20 ENCOUNTER — HOSPITAL ENCOUNTER (OUTPATIENT)
Dept: PHYSICAL THERAPY | Age: 84
Discharge: HOME | End: 2019-08-20
Payer: MEDICARE

## 2019-08-20 PROCEDURE — 97112 NEUROMUSCULAR REEDUCATION: CPT

## 2019-08-20 PROCEDURE — 97110 THERAPEUTIC EXERCISES: CPT

## 2019-08-20 NOTE — PROGRESS NOTES
PT DAILY TREATMENT NOTE 10-18    Patient Name: Darius Rowe  Date:2019  : 1935  [x]  Patient  Verified  Payor: Candie Smith / Plan: Isabel Sweetix / Product Type: Managed Care Medicare /    In time:5:23 Out time:54  Total Treatment Time (min): 47  Visit #: 6 of 16    Medicare/BCBS Only   Total Timed Codes (min):  54 1:1 Treatment Time:  54       Treatment Area: Unspecified abnormalities of gait and mobility [R26.9]  History of falling [Z91.81]    SUBJECTIVE  Pain Level (0-10 scale): 5  Any medication changes, allergies to medications, adverse drug reactions, diagnosis change, or new procedure performed?: [x] No    [] Yes (see summary sheet for update)  Subjective functional status/changes:   [] No changes reported  Pt reports that he has noticed that it is easier for him to get out of a chair and he feels his feet are stronger.      OBJECTIVE        Min Type Additional Details    [] Estim:  []Unatt       []IFC  []Premod                        []Other:  []w/ice   []w/heat  Position:  Location:    [] Estim: []Att    []TENS instruct  []NMES                    []Other:  []w/US   []w/ice   []w/heat  Position:  Location:    []  Traction: [] Cervical       []Lumbar                       [] Prone          []Supine                       []Intermittent   []Continuous Lbs:  [] before manual  [] after manual    []  Ultrasound: []Continuous   [] Pulsed                           []1MHz   []3MHz W/cm2:  Location:    []  Iontophoresis with dexamethasone         Location: [] Take home patch   [] In clinic    []  Ice     []  heat  []  Ice massage  []  Laser   []  Anodyne Position:  Location:    []  Laser with stim  []  Other:  Position:  Location:    []  Vasopneumatic Device Pressure:       [] lo [] med [] hi   Temperature: [] lo [] med [] hi   [] Skin assessment post-treatment:  []intact []redness- no adverse reaction    []redness  adverse reaction:              25 min Therapeutic Exercise:  [x] See flow sheet :   Rationale: increase ROM and increase strength to improve the patients ability to increase tolerance to activities.         29 min Neuromuscular Re-education:  [x]  See flow sheet :dynmaic gait and balance exercises.    Rationale: improve coordination, improve balance and increase proprioception  to improve the patients ability to increase ease with ADLs.             With   [] TE   [] TA   [] neuro   [] other: Patient Education: [x] Review HEP    [] Progressed/Changed HEP based on:   [] positioning   [] body mechanics   [] transfers   [] heat/ice application    [] other:      Other Objective/Functional Measures: TUG (10 feet, no AD) = 18 sec. Pain Level (0-10 scale) post treatment: 5    ASSESSMENT/Changes in Function: Pt continues to require 1 HHA from therapist to perform dynamic gait without an AD. Pt requires B UE to perform sit to stand. Pt required more rest breaks this session and seemed a little more tired this session. Patient will continue to benefit from skilled PT services to modify and progress therapeutic interventions, address functional mobility deficits, address ROM deficits, address strength deficits and address imbalance/dizziness to attain remaining goals. []  See Plan of Care  []  See progress note/recertification  []  See Discharge Summary         Progress towards goals / Updated goals:  Short Term Goals: To be accomplished in 3-4 weeks:  1. Patient will subjectively report full compliance with prescribed HEP. Eval: HEP provided  Current: Goal met: Pt reports performing HEP. 8/6/19  2. Patient will demonstrate left/right ankle DF MMT >/= 4+/5 to improve safety with community ambulation. Eval: Left Ankle DF MMT = 4/5, Right Ankle DF MMT = 3+/5  3. Patient will demonstrate rhomberg (EC) >/= 20 seconds to improve safety with ambulation at nighttime.   Eval: Rhomberg (EC) = (immediate loss of balance)  Current: Progressing, Rhomberg (EC) = 3 sec, 8/15/2019     Long Term Goals: To be accomplished in 7-8 weeks:  1. Patient will demonstrate a significant functional improvement as demonstrated by a score of >/= 69 on FOTO. Eval: FOTO = 61  2. Patient will demonstrate TUG (10 feet, no AD) </= 13 seconds to demonstrate a reduced falls risk. Eval: TUG (10 feet, no AD) = 18 sec  Current: Remains: TUG (10 feet, no AD) = 18 sec. 8/20/19  3. Patient will demonstrate rhomberg (EO, airex) >/= 20 seconds to demonstrate improved safety with ambulation on uneven surfaces.   Eval: Rhomberg (EO, Airex) = Immediate loss of balance  Current: Remains, Rhomberg (EO, Airex) = Immediate loss of balance, 8/15/2019    PLAN  []  Upgrade activities as tolerated     [x]  Continue plan of care  []  Update interventions per flow sheet       []  Discharge due to:_  []  Other:_      Camilla Hummel PTA 8/20/2019  5:15 PM    Future Appointments   Date Time Provider Valerio Banerjee   8/20/2019  5:30 PM Blake Root [facility]   8/22/2019  5:30 PM Jenny Sack, PT [facility]   8/27/2019  5:30 PM Tarlinnette Willoughbyk [facility]   8/29/2019  5:30 PM Jenny Sack, PT [facility]   9/3/2019  5:30 PM Skip Pleva, PTA [facility]   9/5/2019  5:30 PM Jenny Sack, PT [facility]   9/10/2019  5:30 PM Skip Pleva, PTA [facility]   9/12/2019  5:30 PM Jenny Sack, PT [facility]

## 2019-08-22 ENCOUNTER — HOSPITAL ENCOUNTER (OUTPATIENT)
Dept: PHYSICAL THERAPY | Age: 84
Discharge: HOME | End: 2019-08-22
Payer: MEDICARE

## 2019-08-22 PROCEDURE — 97110 THERAPEUTIC EXERCISES: CPT

## 2019-08-22 PROCEDURE — 97112 NEUROMUSCULAR REEDUCATION: CPT

## 2019-08-22 NOTE — PROGRESS NOTES
PT DAILY TREATMENT NOTE 10-18    Patient Name: Janee Oleary  Date:2019  : 1935  [x]  Patient  Verified  Payor: Markus Schulz / Plan: Karla Mckeon / Product Type: Managed Care Medicare /    In time:530  Out time:619  Total Treatment Time (min): 49  Visit #: 6 of 16    Medicare/BCBS Only   Total Timed Codes (min):  49 1:1 Treatment Time:  44       Treatment Area: Unspecified abnormalities of gait and mobility [R26.9]  History of falling [Z91.81]    SUBJECTIVE  Pain Level (0-10 scale): 5  Any medication changes, allergies to medications, adverse drug reactions, diagnosis change, or new procedure performed?: [x] No    [] Yes (see summary sheet for update)  Subjective functional status/changes:   [] No changes reported  Patient reports perception of improved foot clearance. OBJECTIVE    20 min Therapeutic Exercise:  [x] See flow sheet : Emphasis placed on improving LE strength and AROM   Rationale: increase ROM and increase strength to improve the patients ability to improve ease with functional ADLs     29 min Neuromuscular Re-education:  [x]  See flow sheet : Emphasis placed on improving LE proprioceptive and kinesthetic awareness and static and dynamic balance   Rationale: increase ROM, increase strength, improve balance and increase proprioception  to improve the patients ability to improve safety with community ADLs        With   [] TE   [] TA   [] neuro   [] other: Patient Education: [x] Review HEP    [] Progressed/Changed HEP based on:   [] positioning   [] body mechanics   [] transfers   [] heat/ice application    [] other:      Other Objective/Functional Measures:   Rhomerg (EO, Airex) = 5 sec (posterior LoB - verbal cuing required to anteriorly weightshift)     Pain Level (0-10 scale) post treatment: 5    ASSESSMENT/Changes in Function: Improved dynamic gait stability with ability to perform leisa management with appropriate foot clearance.  Continued uncompensated posterior weightshift with patient benefiting from verbal cuing to increase weightbearing through forefoot. Patient will continue to benefit from skilled PT services to address ROM deficits, address strength deficits, analyze and address soft tissue restrictions, analyze and cue movement patterns, analyze and modify body mechanics/ergonomics, assess and modify postural abnormalities, address imbalance/dizziness and instruct in home and community integration to attain remaining goals. []  See Plan of Care  []  See progress note/recertification  []  See Discharge Summary         Progress towards goals / Updated goals:    Short Term Goals: To be accomplished in 3-4 weeks:  1. Patient will subjectively report full compliance with prescribed HEP. Eval: HEP provided  Current: Goal met: Pt reports performing HEP. 8/6/19  2. Patient will demonstrate left/right ankle DF MMT >/= 4+/5 to improve safety with community ambulation. Eval: Left Ankle DF MMT = 4/5, Right Ankle DF MMT = 3+/5  3. Patient will demonstrate rhomberg (EC) >/= 20 seconds to improve safety with ambulation at nighttime. Eval: Rhomberg (EC) = (immediate loss of balance)  Current: Progressing, Rhomberg (EC) = 3 sec, 8/15/2019     Long Term Goals: To be accomplished in 7-8 weeks:  1. Patient will demonstrate a significant functional improvement as demonstrated by a score of >/= 69 on FOTO. Eval: FOTO = 61  2. Patient will demonstrate TUG (10 feet, no AD) </= 13 seconds to demonstrate a reduced falls risk. Eval: TUG (10 feet, no AD) = 18 sec  3. Patient will demonstrate rhomberg (EO, airex) >/= 20 seconds to demonstrate improved safety with ambulation on uneven surfaces.   Eval: Rhomberg (EO, Airex) = Immediate loss of balance  Current: Remains, Rhomberg (EO, Airex) = 5 sec (posterior LoB - verbal cuing required to anteriorly weightshift), 8/22/2019    PLAN  [x]  Upgrade activities as tolerated     [x]  Continue plan of care  []  Update interventions per flow sheet       []  Discharge due to:_  []  Other:_      Doraine Skiff, PT 8/22/2019  8:53 AM    Future Appointments   Date Time Provider Valerio Banerjee   8/22/2019  5:30 [place] [facility] [address] [facility]   8/27/2019  5:30 PM Chapincito Wadsworth [facility]   8/29/2019  5:30 [place] [facility] [address] [facility]   9/3/2019  5:30 PM Deepak Wheatley PTA [facility]   9/5/2019  5:30 PM Edmond Archuleta, PT [facility]   9/10/2019  5:30 PM Deepak Wheatley PTA [facility]   9/12/2019  5:30 PM Edmond Archuleta, PT [facility]

## 2019-08-27 ENCOUNTER — HOSPITAL ENCOUNTER (OUTPATIENT)
Dept: PHYSICAL THERAPY | Age: 84
Discharge: HOME | End: 2019-08-27
Payer: MEDICARE

## 2019-08-27 PROCEDURE — 97112 NEUROMUSCULAR REEDUCATION: CPT

## 2019-08-27 PROCEDURE — 97110 THERAPEUTIC EXERCISES: CPT

## 2019-08-27 NOTE — PROGRESS NOTES
PT DAILY TREATMENT NOTE 10-18    Patient Name: Jose Koenig  Date:2019  : 1935  [x]  Patient  Verified  Payor: Tanna Claire / Plan: Denise Hill / Product Type: Managed Care Medicare /    In time:527  Out time:627  Total Treatment Time (min): 51  Visit #: 7  of 16    Medicare/BCBS Only   Total Timed Codes (min):  51 1:1 Treatment Time:  51       Treatment Area: Unspecified abnormalities of gait and mobility [R26.9]  History of falling [Z91.81]    SUBJECTIVE  Pain Level (0-10 scale): 0  Any medication changes, allergies to medications, adverse drug reactions, diagnosis change, or new procedure performed?: [x] No    [] Yes (see summary sheet for update)  Subjective functional status/changes:   [] No changes reported  Pt notes he has seen some improvement in balance since starting therapy. OBJECTIVE      21 min Therapeutic Exercise:  [x] See flow sheet :   Rationale: increase ROM and increase strength to improve the patients ability to improve ease with ADls    30 min Neuromuscular Re-education:  [x]  See flow sheet :dynamic gait and balance exercises    Rationale: improve coordination, improve balance and increase proprioception  to improve the patients ability to improve safety with community mobility             With   [] TE   [] TA   [] neuro   [] other: Patient Education: [x] Review HEP    [] Progressed/Changed HEP based on:   [] positioning   [] body mechanics   [] transfers   [] heat/ice application    [] other:           Pain Level (0-10 scale) post treatment: 0    ASSESSMENT/Changes in Function: Pt requires continues hand held assistance for dynamic gait exercises.  Pt demonstrates with decreased foot clearance during ambulation     Patient will continue to benefit from skilled PT services to modify and progress therapeutic interventions, address functional mobility deficits, address ROM deficits, address strength deficits, analyze and address soft tissue restrictions, analyze and cue movement patterns, analyze and modify body mechanics/ergonomics, assess and modify postural abnormalities, address imbalance/dizziness and instruct in home and community integration to attain remaining goals. [x]  See Plan of Care  []  See progress note/recertification  []  See Discharge Summary         Progress towards goals / Updated goals:  Short Term Goals: To be accomplished in 3-4 weeks:  1. Patient will subjectively report full compliance with prescribed HEP. Eval: HEP provided  Current: Goal met: Pt reports performing HEP. 8/6/19  2. Patient will demonstrate left/right ankle DF MMT >/= 4+/5 to improve safety with community ambulation. Eval: Left Ankle DF MMT = 4/5, Right Ankle DF MMT = 3+/5  3. Patient will demonstrate rhomberg (EC) >/= 20 seconds to improve safety with ambulation at nighttime. Eval: Rhomberg (EC) = (immediate loss of balance)  Current: Progressing, Rhomberg (EC) = 3 sec, 8/15/2019  Current: Regressing EC: immediate loss of balance 8/27/19     Long Term Goals: To be accomplished in 7-8 weeks:  1. Patient will demonstrate a significant functional improvement as demonstrated by a score of >/= 69 on FOTO. Eval: FOTO = 61  2. Patient will demonstrate TUG (10 feet, no AD) </= 13 seconds to demonstrate a reduced falls risk. Eval: TUG (10 feet, no AD) = 18 sec  3. Patient will demonstrate rhomberg (EO, airex) >/= 20 seconds to demonstrate improved safety with ambulation on uneven surfaces.   Eval: Rhomberg (EO, Airex) = Immediate loss of balance  Current: Remains, Rhomberg (EO, Airex) = 5 sec (posterior LoB - verbal cuing required to anteriorly weightshift), 8/22/2019    PLAN  [x]  Upgrade activities as tolerated     [x]  Continue plan of care  []  Update interventions per flow sheet       []  Discharge due to:_  []  Other:_      Ganesh Lee 8/27/2019  5:25 PM    Future Appointments   Date Time Provider Valerio Banerjee   8/27/2019  5:30 PM Toribio Hernández [facility] 8/29/2019  5:30 PM Esteban Monteiro, PT [facility]   9/3/2019  5:30 PM Loraine Herrera, PTA [facility]   9/5/2019  5:30 PM Esteban Monteiro, PT [facility]   9/10/2019  5:30 PM Loraine Herrera, PTA [facility]   9/12/2019  5:30 PM Esteabn Monteiro, PT [facility]

## 2019-08-29 ENCOUNTER — HOSPITAL ENCOUNTER (OUTPATIENT)
Dept: PHYSICAL THERAPY | Age: 84
Discharge: HOME | End: 2019-08-29
Payer: MEDICARE

## 2019-08-29 PROCEDURE — 97110 THERAPEUTIC EXERCISES: CPT

## 2019-08-29 PROCEDURE — 97112 NEUROMUSCULAR REEDUCATION: CPT

## 2019-08-29 NOTE — PROGRESS NOTES
PT DAILY TREATMENT NOTE 10-18    Patient Name: Miranda Virk  Date:2019  : 1935  [x]  Patient  Verified  Payor: Marley Moser / Plan: Mellissa Closs / Product Type: Managed Care Medicare /    In time:530  Out time:625  Total Treatment Time (min): 55  Visit #: 8 of 16    Medicare/BCBS Only   Total Timed Codes (min):  55 1:1 Treatment Time:  40       Treatment Area: Unspecified abnormalities of gait and mobility [R26.9]  History of falling [Z91.81]    SUBJECTIVE  Pain Level (0-10 scale): 0  Any medication changes, allergies to medications, adverse drug reactions, diagnosis change, or new procedure performed?: [x] No    [] Yes (see summary sheet for update)  Subjective functional status/changes:   [] No changes reported  Patient reports self perceived improvement in balance    OBJECTIVE     25 min Therapeutic Exercise:  [x] See flow sheet : Emphasis placed on improving LE strength and AROM   Rationale: increase ROM and increase strength to improve the patients ability to improve ease with functional ADLs     30 min Neuromuscular Re-education:  [x]  See flow sheet : Emphasis placed on improving LE proprioceptive and kinesthetic awareness and static and dynamic balance   Rationale: increase ROM, increase strength, improve balance and increase proprioception  to improve the patients ability to improve safety with community ADLs          With   [] TE   [] TA   [] neuro   [] other: Patient Education: [x] Review HEP    [] Progressed/Changed HEP based on:   [] positioning   [] body mechanics   [] transfers   [] heat/ice application    [] other:      Other Objective/Functional Measures: See goals below.      Pain Level (0-10 scale) post treatment: 0    ASSESSMENT/Changes in Function: Patient subjectively with significant improvement in gait stability but noted observationally within clinic to continue to demonstrate uncompensated posterior weight shift with subjective report of fear of falling forward. Continued poor toe clearance secondarily resulting in gait instability. Reduction in TUG thus placing patient at a reduced falls risk. Patient will continue to benefit from skilled PT services to address strength deficits, analyze and address soft tissue restrictions, analyze and cue movement patterns, analyze and modify body mechanics/ergonomics, assess and modify postural abnormalities, address imbalance/dizziness and instruct in home and community integration to attain remaining goals. []  See Plan of Care  [x]  See progress note/recertification  []  See Discharge Summary         Progress towards goals / Updated goals:    Short Term Goals: To be accomplished in 3-4 weeks:  1. Patient will subjectively report full compliance with prescribed HEP. Eval: HEP provided  Current: Goal met: Pt reports performing HEP. 8/6/19  2. Patient will demonstrate left/right ankle DF MMT >/= 4+/5 to improve safety with community ambulation. Eval: Left Ankle DF MMT = 4/5, Right Ankle DF MMT = 3+/5  Current: Progressing, Left Ankle DF MMT = 4/5, Right Ankle DF MMT = 4/5, 8/29/2019  3. Patient will demonstrate rhomberg (EC) >/= 20 seconds to improve safety with ambulation at nighttime. Eval: Rhomberg (EC) = (immediate loss of balance)  Current: Progressing, Rhomberg (EC) = 3 sec, 8/15/2019     Long Term Goals: To be accomplished in 7-8 weeks:  1. Patient will demonstrate a significant functional improvement as demonstrated by a score of >/= 69 on FOTO. Eval: FOTO = 61   Current: Regressing, FOTO = 45, 8/29/2019  2. Patient will demonstrate TUG (10 feet, no AD) </= 13 seconds to demonstrate a reduced falls risk. Eval: TUG (10 feet, no AD) = 18 sec  Current: Progressing, TUG (10 feet, no AD) = 14 sec, 8/29/2019  3. Patient will demonstrate rhomberg (EO, airex) >/= 20 seconds to demonstrate improved safety with ambulation on uneven surfaces.   Eval: Rhomberg (EO, Airex) = Immediate loss of balance  Current: Remains, Rhomberg (EO, Airex) = 5 sec (posterior LoB - verbal cuing required to anteriorly weightshift), 8/22/2019    PLAN  [x]  Upgrade activities as tolerated     [x]  Continue plan of care  []  Update interventions per flow sheet       []  Discharge due to:_  []  Other:_      Capri Almaraz, PT 8/29/2019  8:23 AM    Future Appointments   Date Time Provider Valerio Banerjee   8/29/2019  5:30 PM Josias Azar, PT [facility]   9/3/2019  5:30 PM Logan Grewal, ALEXYS [facility]   9/5/2019  5:30 PM Josias Azar, PT [facility]   9/10/2019  5:30 PM Logan Grewal PTA [facility]   9/12/2019  5:30 PM Josias Azar, PT [facility]

## 2019-08-29 NOTE — PROGRESS NOTES
[facility]              [address]        [place], [address] [place]   [phone] fax    Medicare Progress Report    Patient name: Miranda Virk Start of Care: 2019   Referral source: Edd Pearson MD : 1935   Medical/Treatment Diagnosis: Unspecified abnormalities of gait and mobility [R26.9]  History of falling [Z91.81]  Payor: Marley Moser / Plan: Mellissa Closs / Product Type: [employer] Medicare /  Onset Date:Chronic, Worsening over last year     Prior Hospitalization: see medical history Provider#: [id number]   Medications: Verified on Patient Summary List    Comorbidities: Heart Disease, Coronary Bypass (), Replacement of Heart Valve (), Pacemaker   Prior Level of Function: (I) Functional ADLs, (I) Self-Care ADLs, Work Full-Time, Falls History, (I) Driving  Visits from [place] of Care: 9    Missed Visits: 0    Reporting Period: 2019 to 2019    Subjective Reports:     Short Term Goals: To be accomplished in 3-4 weeks:  1. Patient will subjectively report full compliance with prescribed HEP. Eval: HEP provided  At PN: Goal met: Pt reports performing HEP  2. Patient will demonstrate left/right ankle DF MMT >/= 4+/5 to improve safety with community ambulation. Eval: Left Ankle DF MMT = 4/5, Right Ankle DF MMT = 3+/5  At PN: Progressing, Left Ankle DF MMT = 4/5, Right Ankle DF MMT = 4/5  3. Patient will demonstrate rhomberg (EC) >/= 20 seconds to improve safety with ambulation at nighttime. Eval: Rhomberg (EC) = (immediate loss of balance)  At PN: Progressing, Rhomberg (EC) = 3 sec    Long Term Goals: To be accomplished in 7-8 weeks:  1. Patient will demonstrate a significant functional improvement as demonstrated by a score of >/= 69 on FOTO. Eval: FOTO = 61   At PN: Regressing, FOTO = 45  2. Patient will demonstrate TUG (10 feet, no AD) </= 13 seconds to demonstrate a reduced falls risk.   Eval: TUG (10 feet, no AD) = 18 sec  At PN: Progressing, TUG (10 feet, no AD) = 14 sec  3. Patient will demonstrate rhomberg (EO, airex) >/= 20 seconds to demonstrate improved safety with ambulation on uneven surfaces. Eval: Rhomberg (EO, Airex) = Immediate loss of balance  At PN: Remains, Rhomberg (EO, Airex) = 5 sec (posterior LoB - verbal cuing required to anteriorly weightshift)    Key functional changes: See goals above. Problems/ barriers to goal attainment: None. Assessment / Recommendations:    Patient subjectively with significant improvement in gait stability but noted observationally within clinic to continue to demonstrate uncompensated posterior weight shift with subjective report of fear of falling forward. Continued poor toe clearance secondarily resulting in gait instability. Reduction in TUG thus placing patient at a reduced falls risk.     Patient will continue to benefit from skilled PT services to address strength deficits, analyze and address soft tissue restrictions, analyze and cue movement patterns, analyze and modify body mechanics/ergonomics, assess and modify postural abnormalities, address imbalance/dizziness and instruct in home and community integration to attain remaining goals. Problem List: pain affecting function, decrease ROM, decrease strength, edema affecting function, impaired gait/ balance, decrease ADL/ functional abilitiies, decrease activity tolerance, decrease flexibility/ joint mobility and decrease transfer abilities   Treatment Plan: Therapeutic exercise, Therapeutic activities, Neuromuscular re-education, Physical agent/modality, Gait/balance training, Manual therapy, Patient education, Self Care training, Functional mobility training, Home safety training and Stair training    Updated Goals: Continue with unmet goals above.     Frequency / Duration: Patient to be seen 2 times per week for 4 weeks:      Poonam Oh, PT 8/29/2019 8:24 AM

## 2019-08-30 ENCOUNTER — OFFICE VISIT (OUTPATIENT)
Dept: CARDIOLOGY | Facility: CLINIC | Age: 84
End: 2019-08-30

## 2019-08-30 VITALS
SYSTOLIC BLOOD PRESSURE: 131 MMHG | DIASTOLIC BLOOD PRESSURE: 77 MMHG | BODY MASS INDEX: 27.06 KG/M2 | WEIGHT: 189 LBS | HEART RATE: 71 BPM | HEIGHT: 70 IN

## 2019-08-30 DIAGNOSIS — I48.19 PERSISTENT ATRIAL FIBRILLATION (HCC): Primary | ICD-10-CM

## 2019-08-30 DIAGNOSIS — G47.33 OSA ON CPAP: ICD-10-CM

## 2019-08-30 DIAGNOSIS — R55 SYNCOPE, UNSPECIFIED SYNCOPE TYPE: ICD-10-CM

## 2019-08-30 DIAGNOSIS — E11.8 TYPE 2 DIABETES MELLITUS WITH COMPLICATION, WITHOUT LONG-TERM CURRENT USE OF INSULIN (HCC): ICD-10-CM

## 2019-08-30 DIAGNOSIS — E78.5 DYSLIPIDEMIA: ICD-10-CM

## 2019-08-30 DIAGNOSIS — Z99.89 OSA ON CPAP: ICD-10-CM

## 2019-08-30 DIAGNOSIS — I10 ESSENTIAL HYPERTENSION: ICD-10-CM

## 2019-08-30 PROCEDURE — 99214 OFFICE O/P EST MOD 30 MIN: CPT | Performed by: INTERNAL MEDICINE

## 2019-08-30 RX ORDER — CARVEDILOL 6.25 MG/1
6.25 TABLET ORAL 2 TIMES DAILY WITH MEALS
COMMUNITY
End: 2020-07-16

## 2019-08-30 NOTE — PROGRESS NOTES
Subjective:     Encounter Date:08/30/2019    Patient ID: Micha Crockett is an 84 y.o.  white male, a , from [place], [place].       PHYSICIAN: Anthony Fried MD  PREVIOUS CARDIOLOGIST: Gypsy Gibbs MD  ELECTROPHYSIOLOGIST: Luc Lange MD, [facility], [facility]  ORTHOPEDIST: Vitaly Omer MD  SLEEP MD:  Gypsy Gibbs MD    Chief Complaint:   Chief Complaint   Patient presents with   • Atrial Fibrillation     Problem List:  1. Remote paroxysmal atrial fibrillation with now chronic sustained atrial fibrillation and progressive sick sinus syndrome:  a. Diagnosed in [facility], in April 2014.   b. CHADS-VASc score of 4 with anticoagulation on Xarelto.  c. Successful cardioversion with restoration of sinus rhythm with advanced trifascicular block with DE interval of 292 msec in the setting of CRBB/LAHB, on 04/29/2014, by Dr. Slaughter.   d. Return of atrial fibrillation with Holter monitor.  e. Holter monitor for 48 hours with a minimum heart rate of 42 beats per minute and max of 138 beats per minute, with ventricular ectopy less than 1% of the time and supraventricular ectopy 2% of the time, 03/27/2015.  f. Echocardiogram showing estimated EF of 61% with mild-to-moderate LVH and left atrium slightly dilated, and right ventricle slightly dilated, and moderate aortic cuff sclerosis, and mild aortic regurgitation with mild mitral regurgitation, and mild-to-moderate tricuspid regurgitation.  g. Implantation of [employer] single-chamber permanent pacemaker on 07/13/2015 by Dr. Cano, with acceptable interrogation and no reprogramming, April 2017, with subsequent acceptable remote check, August 2017, November 2018, with acceptable interrogation, January 2019, July 2019.  h. Recent NYHA class II-III exertional dyspnea and fatigue without angina pectoris with abnormal echocardiogram (May 2019) and subsequent EP assessment and discontinuation of Bystolic  i. Residual class  I symptoms, August 2019  2. Syncopal episodes secondary to bradycardia.   3. Hypertension.   4. Dyslipidemia.   5. Diabetes mellitus type 2, hemoglobin  A1c 6.7%, July 2015; 6.6%, December 2018  6. Chronic right bundle branch block.   7. Degenerative joint disease.   8. Ventral hernia.   9. Chronic kidney disease.   10. Obstructive sleep apnea with the use of CPAP.   11. Surgical history:  a. Appendectomy.   b. Pilonidal cyst.  c. 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  d. Prostatectomy.   12. Recent progressive dysphoria, weakness, exercise intolerance, and fatigue, off CPAP therapy, with recently completed polysomnogram and laboratory studies - data deficit, July 2019    Allergies   Allergen Reactions   • Bystolic [Nebivolol Hcl] Other (See Comments)     Fatigue, Couldn't sleep          Current Outpatient Medications:   •  aspirin 81 MG tablet, Take 81 mg by mouth Daily., Disp: , Rfl:   •  benazepril (LOTENSIN) 20 MG tablet, Take 20 mg by mouth Daily., Disp: , Rfl: 1  •  carvedilol (COREG) 6.25 MG tablet, Take 6.25 mg by mouth 2 (Two) Times a Day With Meals., Disp: , Rfl:   •  fenofibrate 160 MG tablet, Take 160 mg by mouth Daily., Disp: , Rfl: 0  •  hydrochlorothiazide (HYDRODIURIL) 25 MG tablet, Take 12.5 mg by mouth Daily., Disp: , Rfl: 1  •  metFORMIN XR (GLUCOPHAGE-XR) 500 MG 24 hr tablet, Take 500 mg by mouth Daily., Disp: , Rfl: 1  •  pravastatin (PRAVACHOL) 20 MG tablet, Take 20 mg by mouth Daily., Disp: , Rfl: 1  •  XARELTO 15 MG tablet, TAKE 1 TABLET BY MOUTH EVERY DAY WITH DINNER, Disp: 30 tablet, Rfl: 11    HISTORY OF PRESENT ILLNESS: Patient returns for scheduled 7-week followup.  He established care with Dr. Lange in mid-August 2019.  He says he feels much better since he stopped taking Bystolic and Dr. Lange made adjustments to his pacemaker.  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  They are living in a camper right now.  He has a son who lives here in [place] and another one that lives in [place].  They plan to spend part of the winter with their son in [place] and part of it here in [place] so they can be here for their great-grandchildren's birthdays.  Patient otherwise denies chest pain, shortness of breath, PND, edema, palpitations, syncope or presyncope at this time.        ROS   All other systems reviewed and otherwise negative.    Procedures       Objective:       Vitals:    08/30/19 1404 08/30/19 1409   BP: 140/75 131/77   BP Location: Left arm Left arm   Patient Position: Sitting Standing   Pulse: 71 71   Weight: 85.7 kg (189 lb)    Height: 177.8 cm (70\")      Body mass index is 27.12 kg/m².   Last weight:  186 lbs.    Physical Exam   Constitutional: He is oriented to person, place, and time. He appears well-developed and well-nourished.   Neck: No JVD present. Carotid bruit is not present. No thyromegaly present.   Cardiovascular: S1 normal and S2 normal. An irregularly irregular rhythm present. Exam reveals no gallop, no S3 and no friction rub.   Murmur heard.   Medium-pitched early systolic murmur is present with a grade of 2/6 at the lower left sternal border.  Pulses:       Carotid pulses are 1+ on the right side, and 1+ on the left side.       Radial pulses are 1+ on the right side, and 1+ on the left side.        Femoral pulses are 1+ on the right side, and 1+ on the left side.       Popliteal pulses are 1+ on the right side, and 1+ on the left side.        Dorsalis pedis pulses are 1+ on the right side, and 1+ on the left side.        Posterior tibial pulses are 1+ on the right side, and 1+ on the left side.   Pulmonary/Chest: Effort normal. He has decreased breath sounds. He has no wheezes. He has no rhonchi. He has no rales.   Left precordial pacemaker site is nominal   Abdominal: Soft. He exhibits no mass. There is no hepatosplenomegaly. There is no tenderness. " There is no guarding.   Bowel sounds audible x4   Musculoskeletal: Normal range of motion. He exhibits no edema.   Lymphadenopathy:     He has no cervical adenopathy.   Neurological: He is alert and oriented to person, place, and time.   Skin: Skin is warm, dry and intact. No rash noted.   Vitals reviewed.        Lab Review:   Lab Results   Component Value Date    GLUCOSE 107 (H) 07/15/2015    BUN 28 (H) 07/15/2015    CREATININE 1.4 (H) 07/15/2015    CO2 29 07/15/2015    CALCIUM 8.8 07/15/2015    ALBUMIN 4.0 07/12/2015    AST 17 07/12/2015    ALT 16 07/12/2015       Lab Results   Component Value Date    WBC 10.47 07/14/2015    HGB 12.0 (L) 07/14/2015    HCT 37.3 (L) 07/14/2015    MCV 86.9 07/14/2015     07/14/2015       Lab Results   Component Value Date    HGBA1C 6.7 (H) 07/12/2015       Lab Results   Component Value Date    TSH 3.548 07/12/2015       Lab Results   Component Value Date     (H) 07/12/2015     Echocardiogram, May 2019, Dr. Gibbs ([place], [place])  · Moderate reduction LVEF (0.40-0.45) with mild concentric LVH  · Moderate LAE  · Mild AI  · Moderate MR  · Mild ascending thoracic aortic root enlargement (41 mm)  · No PE      Assessment:   Overall continued acceptable course with no new interim cardiopulmonary complaints with good functional status. We will defer additional diagnostic or therapeutic intervention from a cardiac perspective at this time.       Diagnosis Plan   1. Persistent atrial fibrillation (CMS/HCC)  Adult Transthoracic Echo Complete W/ Cont if Necessary Per Protocol   2. Syncope, unspecified syncope type  No recurrence; Continue current treatment.    3. Essential hypertension  Acceptable control; Continue current treatment.    4. CANDY on CPAP  Compliance stressed   5. Type 2 diabetes mellitus with complication, without long-term current use of insulin (CMS/HCC)  No recent laboratory results to review; Continue current treatment.    6. Dyslipidemia  No data to review; continue  pravastatin          Plan:         1. Patient to continue current medications and close follow up with the above providers.  2. Tentative cardiology follow up in April or May 2020 with same-day echocardiogram, or patient may return sooner PRN.  3. Travel packet will be provided the patient for his time in [place] over the winter.    Transcribed by Mirna Damon for Dr. Daniel Slaughter at 2:19 PM on 08/30/2019    I, Daniel Slaughter MD, [facility], personally performed the services described in this documentation as scribed by the above named individual in my presence, and it is both accurate and complete. At 3:03 PM on 08/30/2019

## 2019-09-03 ENCOUNTER — HOSPITAL ENCOUNTER (OUTPATIENT)
Dept: PHYSICAL THERAPY | Age: 84
Discharge: HOME | End: 2019-09-03
Payer: MEDICARE

## 2019-09-03 PROCEDURE — 97110 THERAPEUTIC EXERCISES: CPT

## 2019-09-03 PROCEDURE — 97112 NEUROMUSCULAR REEDUCATION: CPT

## 2019-09-03 NOTE — PROGRESS NOTES
PT DAILY TREATMENT NOTE 10-18    Patient Name: Chelsey All  Date:9/3/2019  : 1935  [x]  Patient  Verified  Payor: Ida Chan / Plan: Via [employer] / Product Type: Managed Care Medicare /    In time:5:30  Out time:6:25  Total Treatment Time (min): 55  Visit #: 9 of 16    Medicare/BCBS Only   Total Timed Codes (min):  55 1:1 Treatment Time:  55       Treatment Area: Unspecified abnormalities of gait and mobility [R26.9]  History of falling [Z91.81]    SUBJECTIVE  Pain Level (0-10 scale): 0  Any medication changes, allergies to medications, adverse drug reactions, diagnosis change, or new procedure performed?: [x] No    [] Yes (see summary sheet for update)  Subjective functional status/changes:   [] No changes reported  Pt noticed that he was most off balance over the weekend when he transitioned to stand after sitting for long period of time.      OBJECTIVE        Min Type Additional Details    [] Estim:  []Unatt       []IFC  []Premod                        []Other:  []w/ice   []w/heat  Position:  Location:    [] Estim: []Att    []TENS instruct  []NMES                    []Other:  []w/US   []w/ice   []w/heat  Position:  Location:    []  Traction: [] Cervical       []Lumbar                       [] Prone          []Supine                       []Intermittent   []Continuous Lbs:  [] before manual  [] after manual    []  Ultrasound: []Continuous   [] Pulsed                           []1MHz   []3MHz W/cm2:  Location:    []  Iontophoresis with dexamethasone         Location: [] Take home patch   [] In clinic    []  Ice     []  heat  []  Ice massage  []  Laser   []  Anodyne Position:  Location:    []  Laser with stim  []  Other:  Position:  Location:    []  Vasopneumatic Device Pressure:       [] lo [] med [] hi   Temperature: [] lo [] med [] hi   [] Skin assessment post-treatment:  []intact []redness- no adverse reaction    []redness  adverse reaction:           25 min Therapeutic Exercise:  [x] See flow sheet :   Rationale: increase ROM and increase strength to improve the patients ability to increase tolerance to activities.         30 min Neuromuscular Re-education:  [x]  See flow sheet :dynmaic gait and balance exercises.    Rationale: improve coordination, improve balance and increase proprioception  to improve the patients ability to increase ease with ADLs.                                                        With   [] TE   [] TA   [] neuro   [] other: Patient Education: [x] Review HEP    [] Progressed/Changed HEP based on:   [] positioning   [] body mechanics   [] transfers   [] heat/ice application    [] other:      Other Objective/Functional Measures: Rhomberg (EC) = 5 sec. Pain Level (0-10 scale) post treatment: 0    ASSESSMENT/Changes in Function: when performing dynamic gait with stepping over small hurdles pt weight shifts posterior and requires assist from therapist to avoid falls. Pt required more rest breaks throughout session. Patient will continue to benefit from skilled PT services to modify and progress therapeutic interventions, address functional mobility deficits, address ROM deficits, address strength deficits, analyze and address soft tissue restrictions and address imbalance/dizziness to attain remaining goals. []  See Plan of Care  []  See progress note/recertification  []  See Discharge Summary         Progress towards goals / Updated goals:  Short Term Goals: To be accomplished in 3-4 weeks:  1. Patient will subjectively report full compliance with prescribed HEP. PN: Goal met: Pt reports performing HEP. 8/6/19  2. Patient will demonstrate left/right ankle DF MMT >/= 4+/5 to improve safety with community ambulation. PN: Left Ankle DF MMT = 4/5, Right Ankle DF MMT = 4/5, 8/29/2019  3. Patient will demonstrate rhomberg (EC) >/= 20 seconds to improve safety with ambulation at nighttime.   PN: Rhomberg (EC) = 3 sec, 8/15/2019  Current; Progressing: Rhomberg (EC) = 5 sec. 9/3/19     Long Term Goals: To be accomplished in 7-8 weeks:  1. Patient will demonstrate a significant functional improvement as demonstrated by a score of >/= 69 on FOTO. PN: FOTO = 45, 8/29/2019  2. Patient will demonstrate TUG (10 feet, no AD) </= 13 seconds to demonstrate a reduced falls risk. PN: TUG (10 feet, no AD) = 14 sec, 8/29/2019  3. Patient will demonstrate rhomberg (EO, airex) >/= 20 seconds to demonstrate improved safety with ambulation on uneven surfaces.   PN: Rhomberg (EO, Airex) = 5 sec (posterior LoB - verbal cuing required to anteriorly weightshift), 8/22/2019       PLAN  []  Upgrade activities as tolerated     [x]  Continue plan of care  []  Update interventions per flow sheet       []  Discharge due to:_  []  Other:_      Lizeth Ruiz PTA 9/3/2019  5:36 PM    Future Appointments   Date Time Provider Valerio Banerjee   9/5/2019  5:30 PM Franki Garnica, PT [facility]   9/10/2019  5:30 PM Yessenia Ribeiro PTA [facility]   9/12/2019  5:30 PM Franki Garnica PT [facility]

## 2019-09-05 ENCOUNTER — HOSPITAL ENCOUNTER (OUTPATIENT)
Dept: PHYSICAL THERAPY | Age: 84
Discharge: HOME | End: 2019-09-05
Payer: MEDICARE

## 2019-09-05 PROCEDURE — 97110 THERAPEUTIC EXERCISES: CPT

## 2019-09-05 PROCEDURE — 97112 NEUROMUSCULAR REEDUCATION: CPT

## 2019-09-05 NOTE — PROGRESS NOTES
PT DAILY TREATMENT NOTE 10-18    Patient Name: Lucie Andino  Date:2019  : 1935  [x]  Patient  Verified  Payor: Floyd Watson / Plan: Via [employer] / Product Type: Managed Care Medicare /    In time:525  Out time:619  Total Treatment Time (min): 54  Visit #: 10 of 16    Medicare/BCBS Only   Total Timed Codes (min):  54 1:1 Treatment Time:  54       Treatment Area: Unspecified abnormalities of gait and mobility [R26.9]  History of falling [Z91.81]    SUBJECTIVE  Pain Level (0-10 scale): 0  Any medication changes, allergies to medications, adverse drug reactions, diagnosis change, or new procedure performed?: [x] No    [] Yes (see summary sheet for update)  Subjective functional status/changes:   [] No changes reported  Patient reports feeling imbalanced today. OBJECTIVE    25 min Therapeutic Exercise:  [x] See flow sheet : Emphasis placed on improving LE strength and AROM   Rationale: increase ROM and increase strength to improve the patients ability to improve ease with functional ADLs     29 min Neuromuscular Re-education:  [x]  See flow sheet : Emphasis placed on improving LE proprioceptive and kinesthetic awareness and static and dynamic balance   Rationale: increase ROM, increase strength, improve balance and increase proprioception  to improve the patients ability to improve safety with community ADLs        With   [] TE   [] TA   [] neuro   [] other: Patient Education: [x] Review HEP    [] Progressed/Changed HEP based on:   [] positioning   [] body mechanics   [] transfers   [] heat/ice application    [] other:      Other Objective/Functional Measures:   Rhomberg (EC) = 5 sec     Pain Level (0-10 scale) post treatment: 0    ASSESSMENT/Changes in Function: Attempted to complete dynamic gait without handheld assist with patietn demonstrating reduction in balance self-confidence with inability to complete secondary to fear of falling.  Patient noted to demonstrate increased right foot drag with verbal cuing required to increase food clearance. Patient will continue to benefit from skilled PT services to address functional mobility deficits, address ROM deficits, address strength deficits, analyze and address soft tissue restrictions, analyze and cue movement patterns, analyze and modify body mechanics/ergonomics, assess and modify postural abnormalities and address imbalance/dizziness to attain remaining goals. []  See Plan of Care  []  See progress note/recertification  []  See Discharge Summary         Progress towards goals / Updated goals:    Short Term Goals: To be accomplished in 3-4 weeks:  1. Patient will subjectively report full compliance with prescribed HEP. PN: Goal met: Pt reports performing HEP. 8/6/19  2. Patient will demonstrate left/right ankle DF MMT >/= 4+/5 to improve safety with community ambulation. PN: Left Ankle DF MMT = 4/5, Right Ankle DF MMT = 4/5, 8/29/2019  3. Patient will demonstrate rhomberg (EC) >/= 20 seconds to improve safety with ambulation at nighttime. PN: Rhomberg (EC) = 3 sec, 8/15/2019  Current; Progressing: Rhomberg (EC) = 5 sec. 9/3/19     Long Term Goals: To be accomplished in 7-8 weeks:  1. Patient will demonstrate a significant functional improvement as demonstrated by a score of >/= 69 on FOTO. PN: FOTO = 45, 8/29/2019  2. Patient will demonstrate TUG (10 feet, no AD) </= 13 seconds to demonstrate a reduced falls risk. PN: TUG (10 feet, no AD) = 14 sec, 8/29/2019  3. Patient will demonstrate rhomberg (EO, airex) >/= 20 seconds to demonstrate improved safety with ambulation on uneven surfaces.   PN: Rhomberg (EO, Airex) = 5 sec (posterior LoB - verbal cuing required to anteriorly weightshift), 8/22/2019    PLAN  [x]  Upgrade activities as tolerated     [x]  Continue plan of care  []  Update interventions per flow sheet       []  Discharge due to:_  []  Other:_      Poonam Oh, PT 9/5/2019  8:07 AM    Future Appointments Date Time Provider Valerio Banerjee   9/5/2019  5:30 PM Marty, [address] [facility]   9/10/2019  5:30 PM Blake Pineda [facility]   9/12/2019  5:30 PM Dorys Thao PT [facility]

## 2019-09-10 ENCOUNTER — HOSPITAL ENCOUNTER (OUTPATIENT)
Dept: PHYSICAL THERAPY | Age: 84
Discharge: HOME | End: 2019-09-10
Payer: MEDICARE

## 2019-09-10 PROCEDURE — 97112 NEUROMUSCULAR REEDUCATION: CPT

## 2019-09-10 PROCEDURE — 97110 THERAPEUTIC EXERCISES: CPT

## 2019-09-10 NOTE — PROGRESS NOTES
PT DAILY TREATMENT NOTE 10-18    Patient Name: Tika Johnson  Date:9/10/2019  : 1935  [x]  Patient  Verified  Payor: Mishel Chin / Plan: [employer] / Product Type: Managed Care Medicare /    In time:5:30  Out time:6:17  Total Treatment Time (min): 47  Visit #: 11 of 16    Medicare/BCBS Only   Total Timed Codes (min):  47 1:1 Treatment Time:  47       Treatment Area: Unspecified abnormalities of gait and mobility [R26.9]  History of falling [Z91.81]    SUBJECTIVE  Pain Level (0-10 scale): 0  Any medication changes, allergies to medications, adverse drug reactions, diagnosis change, or new procedure performed?: [x] No    [] Yes (see summary sheet for update)  Subjective functional status/changes:   [] No changes reported  Pt reports yesterday he felt as if he knees were weak.      OBJECTIVE        Min Type Additional Details    [] Estim:  []Unatt       []IFC  []Premod                        []Other:  []w/ice   []w/heat  Position:  Location:    [] Estim: []Att    []TENS instruct  []NMES                    []Other:  []w/US   []w/ice   []w/heat  Position:  Location:    []  Traction: [] Cervical       []Lumbar                       [] Prone          []Supine                       []Intermittent   []Continuous Lbs:  [] before manual  [] after manual    []  Ultrasound: []Continuous   [] Pulsed                           []1MHz   []3MHz W/cm2:  Location:    []  Iontophoresis with dexamethasone         Location: [] Take home patch   [] In clinic    []  Ice     []  heat  []  Ice massage  []  Laser   []  Anodyne Position:  Location:    []  Laser with stim  []  Other:  Position:  Location:    []  Vasopneumatic Device Pressure:       [] lo [] med [] hi   Temperature: [] lo [] med [] hi   [] Skin assessment post-treatment:  []intact []redness- no adverse reaction    []redness  adverse reaction:           16 min Therapeutic Exercise:  [x] See flow sheet :   Rationale: increase ROM and increase strength to improve the patients ability to increase tolerance to activities.         30 min Neuromuscular Re-education:  [x]  See flow sheet :dynmaic gait and balance exercises.    Rationale: improve coordination, improve balance and increase proprioception  to improve the patients ability to increase ease with ADLs.                With   [] TE   [] TA   [] neuro   [] other: Patient Education: [x] Review HEP    [] Progressed/Changed HEP based on:   [] positioning   [] body mechanics   [] transfers   [] heat/ice application    [] other:      Other Objective/Functional Measures: TUG (10 feet, no AD) = 18 sec. Pain Level (0-10 scale) post treatment: 0    ASSESSMENT/Changes in Function: Informed pt to schedule 4 more appointments with possible DC after the next 4 if not having any set backs. Pt requires longer and more frequent rest breaks this session than normal. Pt stated he felt more tried this session, which may resulted in increased time for tug and requiring more rest breaks. Patient will continue to benefit from skilled PT services to modify and progress therapeutic interventions, address functional mobility deficits, address ROM deficits, address strength deficits, analyze and address soft tissue restrictions and address imbalance/dizziness to attain remaining goals. []  See Plan of Care  []  See progress note/recertification  []  See Discharge Summary         Progress towards goals / Updated goals:  Short Term Goals: To be accomplished in 3-4 weeks:  1. Patient will subjectively report full compliance with prescribed HEP. PN: Goal met: Pt reports performing HEP. 8/6/19  2. Patient will demonstrate left/right ankle DF MMT >/= 4+/5 to improve safety with community ambulation. PN: Left Ankle DF MMT = 4/5, Right Ankle DF MMT = 4/5, 8/29/2019  3. Patient will demonstrate rhomberg (EC) >/= 20 seconds to improve safety with ambulation at nighttime.   PN: Rhomberg (EC) = 3 sec, 8/15/2019  Current; Progressing: Rhomberg (EC) = 5 sec. 9/3/19     Long Term Goals: To be accomplished in 7-8 weeks:  1. Patient will demonstrate a significant functional improvement as demonstrated by a score of >/= 69 on FOTO. PN: FOTO = 45, 8/29/2019  2. Patient will demonstrate TUG (10 feet, no AD) </= 13 seconds to demonstrate a reduced falls risk. PN: TUG (10 feet, no AD) = 14 sec, 8/29/2019  Current: Regressed: TUG (10 feet, no AD) = 18 sec. 9/10/19  3. Patient will demonstrate rhomberg (EO, airex) >/= 20 seconds to demonstrate improved safety with ambulation on uneven surfaces.   PN: Rhomberg (EO, Airex) = 5 sec (posterior LoB - verbal cuing required to anteriorly weightshift), 8/22/2019       PLAN  []  Upgrade activities as tolerated     [x]  Continue plan of care  []  Update interventions per flow sheet       []  Discharge due to:_  []  Other:_      Rosalind Santillan PTA 9/10/2019  5:37 PM    Future Appointments   Date Time Provider Valerio Banerjee   9/12/2019  5:30 PM Josias Azar, [address] [facility]   10/1/2019  2:00 PM Scarlett Ochoa MD [id number]

## 2019-09-12 ENCOUNTER — HOSPITAL ENCOUNTER (OUTPATIENT)
Dept: PHYSICAL THERAPY | Age: 84
Discharge: HOME | End: 2019-09-12
Payer: MEDICARE

## 2019-09-12 PROCEDURE — 97110 THERAPEUTIC EXERCISES: CPT

## 2019-09-12 PROCEDURE — 97112 NEUROMUSCULAR REEDUCATION: CPT

## 2019-09-12 NOTE — PROGRESS NOTES
PT DAILY TREATMENT NOTE 10-18    Patient Name: Joseline Jaffe  Date:2019  : 1935  [x]  Patient  Verified  Payor: Levi Fisher / Plan: Anna Lewis / Product Type: Managed Care Medicare /    In time:527  Out time:620  Total Treatment Time (min): 53  Visit #: 12 of 16    Medicare/BCBS Only   Total Timed Codes (min):  53 1:1 Treatment Time:  48       Treatment Area: Unspecified abnormalities of gait and mobility [R26.9]  History of falling [Z91.81]    SUBJECTIVE  Pain Level (0-10 scale): 0  Any medication changes, allergies to medications, adverse drug reactions, diagnosis change, or new procedure performed?: [x] No    [] Yes (see summary sheet for update)  Subjective functional status/changes:   [] No changes reported  Patient reports overall feeling better today in comparison to last treatment session.     OBJECTIVE    20 min Therapeutic Exercise:  [x] See flow sheet : Emphasis placed on improving LE strength and AROM   Rationale: increase ROM and increase strength to improve the patients ability to improve ease with functional ADLs     33 min Neuromuscular Re-education:  [x]  See flow sheet : Emphasis placed on improving LE proprioceptive and kinesthetic awareness and static and dynamic balance   Rationale: increase ROM, increase strength, improve balance and increase proprioception  to improve the patients ability to improve safety with community ADLs        With   [] TE   [] TA   [] neuro   [] other: Patient Education: [x] Review HEP    [] Progressed/Changed HEP based on:   [] positioning   [] body mechanics   [] transfers   [] heat/ice application    [] other:      Other Objective/Functional Measures:   Rhomberg (EC) = 5 sec     Pain Level (0-10 scale) post treatment: 0    ASSESSMENT/Changes in Function: With patient continuing to demonstrate continued diminished confidence with leisa completion increased performance with progression of exercises to address self-balance confidence. Patient will continue to benefit from skilled PT services to modify and progress therapeutic interventions, address functional mobility deficits, address ROM deficits, address strength deficits, analyze and address soft tissue restrictions, analyze and cue movement patterns and analyze and modify body mechanics/ergonomics to attain remaining goals. []  See Plan of Care  []  See progress note/recertification  []  See Discharge Summary         Progress towards goals / Updated goals:    Short Term Goals: To be accomplished in 3-4 weeks:  1. Patient will subjectively report full compliance with prescribed HEP. PN: Goal met: Pt reports performing HEP. 8/6/19  2. Patient will demonstrate left/right ankle DF MMT >/= 4+/5 to improve safety with community ambulation. PN: Left Ankle DF MMT = 4/5, Right Ankle DF MMT = 4/5, 8/29/2019  3. Patient will demonstrate rhomberg (EC) >/= 20 seconds to improve safety with ambulation at nighttime. PN: Rhomberg (EC) = 3 sec, 8/15/2019  Current: Progressing, Rhomberg (EC) = 5 sec. 9/31219     Long Term Goals: To be accomplished in 7-8 weeks:  1. Patient will demonstrate a significant functional improvement as demonstrated by a score of >/= 69 on FOTO. PN: FOTO = 45, 8/29/2019  2. Patient will demonstrate TUG (10 feet, no AD) </= 13 seconds to demonstrate a reduced falls risk. PN: TUG (10 feet, no AD) = 14 sec, 8/29/2019  Current: Regressed: TUG (10 feet, no AD) = 18 sec. 9/10/19  3. Patient will demonstrate rhomberg (EO, airex) >/= 20 seconds to demonstrate improved safety with ambulation on uneven surfaces.   PN: Rhomberg (EO, Airex) = 5 sec (posterior LoB - verbal cuing required to anteriorly weightshift), 8/22/2019    PLAN  [x]  Upgrade activities as tolerated     [x]  Continue plan of care  []  Update interventions per flow sheet       []  Discharge due to:_  []  Other:_      Zayda Sharma, PT 9/12/2019  8:03 AM    Future Appointments   Date Time Provider Valerio Lavonne   9/12/2019  5:30 PM Ferguson, [address] [facility]   9/17/2019  5:30 PM Normand Felty, PTA [facility]   9/24/2019  5:30 PM Normand Felty, PTA [facility]   10/1/2019  2:00 PM Kim Bowen MD [address]   10/1/2019  5:30 PM Normand Felty, PTA [facility]   10/3/2019  5:30 PM Mishel Luu PT [facility]

## 2019-09-17 ENCOUNTER — HOSPITAL ENCOUNTER (OUTPATIENT)
Dept: PHYSICAL THERAPY | Age: 84
Discharge: HOME | End: 2019-09-17
Payer: MEDICARE

## 2019-09-17 PROCEDURE — 97110 THERAPEUTIC EXERCISES: CPT

## 2019-09-17 PROCEDURE — 97112 NEUROMUSCULAR REEDUCATION: CPT

## 2019-09-17 NOTE — PROGRESS NOTES
PT DAILY TREATMENT NOTE 10-18    Patient Name: Rahul Koenig  Date:2019  : 1935  [x]  Patient  Verified  Payor: Jer Davidson / Plan: Jerelene Ormond / Product Type: Managed Care Medicare /    In time:5:30  Out time:6:13  Total Treatment Time (min): 43  Visit #: 13 of 16    Medicare/BCBS Only   Total Timed Codes (min):  43 1:1 Treatment Time:  43       Treatment Area: Unspecified abnormalities of gait and mobility [R26.9]  History of falling [Z91.81]    SUBJECTIVE  Pain Level (0-10 scale): 0  Any medication changes, allergies to medications, adverse drug reactions, diagnosis change, or new procedure performed?: [x] No    [] Yes (see summary sheet for update)  Subjective functional status/changes:   [] No changes reported  Pt reports not feeling too tired today.      OBJECTIVE        Min Type Additional Details    [] Estim:  []Unatt       []IFC  []Premod                        []Other:  []w/ice   []w/heat  Position:  Location:    [] Estim: []Att    []TENS instruct  []NMES                    []Other:  []w/US   []w/ice   []w/heat  Position:  Location:    []  Traction: [] Cervical       []Lumbar                       [] Prone          []Supine                       []Intermittent   []Continuous Lbs:  [] before manual  [] after manual    []  Ultrasound: []Continuous   [] Pulsed                           []1MHz   []3MHz W/cm2:  Location:    []  Iontophoresis with dexamethasone         Location: [] Take home patch   [] In clinic    []  Ice     []  heat  []  Ice massage  []  Laser   []  Anodyne Position:  Location:    []  Laser with stim  []  Other:  Position:  Location:    []  Vasopneumatic Device Pressure:       [] lo [] med [] hi   Temperature: [] lo [] med [] hi   [] Skin assessment post-treatment:  []intact []redness- no adverse reaction    []redness  adverse reaction:           13 min Therapeutic Exercise:  [x] See flow sheet :   Rationale: increase ROM and increase strength to improve the patients ability to increase tolerance to activities.         30 min Neuromuscular Re-education:  [x]  See flow sheet :dynmaic gait and balance exercises.    Rationale: improve coordination, improve balance and increase proprioception  to improve the patients ability to increase ease with ADLs.                 With   [] TE   [] TA   [] neuro   [] other: Patient Education: [x] Review HEP    [] Progressed/Changed HEP based on:   [] positioning   [] body mechanics   [] transfers   [] heat/ice application    [] other:      Other Objective/Functional Measures: Rhomberg (EO, airex) 25 sec     Pain Level (0-10 scale) post treatment: 0    ASSESSMENT/Changes in Function: Pt performed balance exercises well today with confidence. Pt required less than normal rest breaks. Patient will continue to benefit from skilled PT services to modify and progress therapeutic interventions, address functional mobility deficits, address ROM deficits, address strength deficits and analyze and address soft tissue restrictions to attain remaining goals. []  See Plan of Care  []  See progress note/recertification  []  See Discharge Summary         Progress towards goals / Updated goals:  Short Term Goals: To be accomplished in 3-4 weeks:  1. Patient will subjectively report full compliance with prescribed HEP. PN: Goal met: Pt reports performing HEP. 8/6/19  2. Patient will demonstrate left/right ankle DF MMT >/= 4+/5 to improve safety with community ambulation. PN: Left Ankle DF MMT = 4/5, Right Ankle DF MMT = 4/5, 8/29/2019  3. Patient will demonstrate rhomberg (EC) >/= 20 seconds to improve safety with ambulation at nighttime. PN: Rhomberg (EC) = 3 sec, 8/15/2019  Current: Progressing, Rhomberg (EC) = 5 sec. 9/37334     Long Term Goals: To be accomplished in 7-8 weeks:  1. Patient will demonstrate a significant functional improvement as demonstrated by a score of >/= 69 on FOTO. PN: FOTO = 45, 8/29/2019  2.  Patient will demonstrate TUG (10 feet, no AD) </= 13 seconds to demonstrate a reduced falls risk. PN: TUG (10 feet, no AD) = 14 sec, 8/29/2019  Current: Regressed: TUG (10 feet, no AD) = 18 sec. 9/10/19  3. Patient will demonstrate rhomberg (EO, airex) >/= 20 seconds to demonstrate improved safety with ambulation on uneven surfaces. PN: Rhomberg (EO, Airex) = 5 sec (posterior LoB - verbal cuing required to anteriorly weightshift), 8/22/2019  Current: Goal met: Rhomberg (EO, airex) 25 sec.  9/17/19       PLAN  []  Upgrade activities as tolerated     [x]  Continue plan of care  []  Update interventions per flow sheet       []  Discharge due to:_  []  Other:_      Jonah Martins PTA 9/17/2019  5:26 PM    Future Appointments   Date Time Provider Valerio Banerjee   9/17/2019  5:30 PM Ashley Sierra PTA [facility]   9/24/2019  5:30 PM Ashley Sierra PTA [facility]   10/1/2019  2:00 PM Durwood Riedel, MD [address]   10/1/2019  5:30 PM Ashley Sierra PTA [facility]   10/3/2019  5:30 PM Sharon Mackay PT [facility]

## 2019-09-24 ENCOUNTER — HOSPITAL ENCOUNTER (OUTPATIENT)
Dept: PHYSICAL THERAPY | Age: 84
Discharge: HOME | End: 2019-09-24
Payer: MEDICARE

## 2019-09-24 PROCEDURE — 97110 THERAPEUTIC EXERCISES: CPT

## 2019-09-24 PROCEDURE — 97112 NEUROMUSCULAR REEDUCATION: CPT

## 2019-09-24 NOTE — PROGRESS NOTES
PT DAILY TREATMENT NOTE 10-18    Patient Name: Darius Rowe  Date:2019  : 1935  [x]  Patient  Verified  Payor: Candie Smith / Plan: Isabel Sweetix / Product Type: Managed Care Medicare /    In time:5:27  Out time:6:20  Total Treatment Time (min): 53  Visit #: 14 of 16    Medicare/BCBS Only   Total Timed Codes (min):  53 1:1 Treatment Time:  53       Treatment Area: Unspecified abnormalities of gait and mobility [R26.9]  History of falling [Z91.81]    SUBJECTIVE  Pain Level (0-10 scale): 0  Any medication changes, allergies to medications, adverse drug reactions, diagnosis change, or new procedure performed?: [x] No    [] Yes (see summary sheet for update)  Subjective functional status/changes:   [] No changes reported  Pt reports that he does not feel that his balance is where he wants it to be yet.      OBJECTIVE        Min Type Additional Details    [] Estim:  []Unatt       []IFC  []Premod                        []Other:  []w/ice   []w/heat  Position:  Location:    [] Estim: []Att    []TENS instruct  []NMES                    []Other:  []w/US   []w/ice   []w/heat  Position:  Location:    []  Traction: [] Cervical       []Lumbar                       [] Prone          []Supine                       []Intermittent   []Continuous Lbs:  [] before manual  [] after manual    []  Ultrasound: []Continuous   [] Pulsed                           []1MHz   []3MHz W/cm2:  Location:    []  Iontophoresis with dexamethasone         Location: [] Take home patch   [] In clinic    []  Ice     []  heat  []  Ice massage  []  Laser   []  Anodyne Position:  Location:    []  Laser with stim  []  Other:  Position:  Location:    []  Vasopneumatic Device Pressure:       [] lo [] med [] hi   Temperature: [] lo [] med [] hi   [] Skin assessment post-treatment:  []intact []redness- no adverse reaction    []redness  adverse reaction:         23 min Therapeutic Exercise:  [x] See flow sheet : Rationale: increase ROM and increase strength to improve the patients ability to increase tolerance to activities.         30 min Neuromuscular Re-education:  [x]  See flow sheet :dynmaic gait and balance exercises.    Rationale: improve coordination, improve balance and increase proprioception  to improve the patients ability to increase ease with ADLs.                 With   [] TE   [] TA   [] neuro   [] other: Patient Education: [x] Review HEP    [] Progressed/Changed HEP based on:   [] positioning   [] body mechanics   [] transfers   [] heat/ice application    [] other:      Other Objective/Functional Measures:see goals      Pain Level (0-10 scale) post treatment: 0    ASSESSMENT/Changes in Function: Pt reports 10-15% improvement since [facility]. Pt has objective improvement in balance. Pt reports subjective improvement with improvement in ambulation and feeling more secure. Patient will continue to benefit from skilled PT services to modify and progress therapeutic interventions, address functional mobility deficits, address ROM deficits, address strength deficits, analyze and address soft tissue restrictions and address imbalance/dizziness to attain remaining goals. []  See Plan of Care  [x]  See progress note/recertification  []  See Discharge Summary         Progress towards goals / Updated goals:  Short Term Goals: To be accomplished in 3-4 weeks:  1. Patient will subjectively report full compliance with prescribed HEP. PN: Goal met: Pt reports performing HEP. 8/6/19  2. Patient will demonstrate left/right ankle DF MMT >/= 4+/5 to improve safety with community ambulation. PN: Left Ankle DF MMT = 4/5, Right Ankle DF MMT = 4/5, 8/29/2019  Current: Regressed: B ankle DF 3/5. 9/24/19  3. Patient will demonstrate rhomberg (EC) >/= 20 seconds to improve safety with ambulation at nighttime. PN: Rhomberg (EC) = 3 sec, 8/15/2019  Current: Progressing, Rhomberg (EC) = 10 sec.  9/24/2019     Long Term Goals: To be accomplished in 7-8 weeks:  1. Patient will demonstrate a significant functional improvement as demonstrated by a score of >/= 69 on FOTO. PN: FOTO = 45, 8/29/2019  Current: Progressing: FOTO = 54. 9/24/19  2. Patient will demonstrate TUG (10 feet, no AD) </= 13 seconds to demonstrate a reduced falls risk. PN: TUG (10 feet, no AD) = 14 sec, 8/29/2019  Current: Regressed: TUG (10 feet, no AD) = 14 sec. 9/24/19  3. Patient will demonstrate rhomberg (EO, airex) >/= 20 seconds to demonstrate improved safety with ambulation on uneven surfaces. PN: Rhomberg (EO, Airex) = 5 sec (posterior LoB - verbal cuing required to anteriorly weightshift), 8/22/2019  Current: Goal met: Rhomberg (EO, airex) 25 sec.  9/17/19       PLAN  []  Upgrade activities as tolerated     [x]  Continue plan of care  []  Update interventions per flow sheet       []  Discharge due to:_  []  Other:_      Rao Jefferson PTA 9/24/2019  5:30 PM    Future Appointments   Date Time Provider Valerio Banerjee   10/1/2019  2:00 PM Stacy Michaels MD [address]   10/1/2019  5:30 PM Jessica Turner PTA [facility]   10/3/2019  5:30 PM Estevan Jones PT [facility]

## 2019-09-25 NOTE — PROGRESS NOTES
[facility]              [address]        [place], [address] [place]   [phone] fax    Continued Plan of Care/ Re-certification for Physical Therapy Services    Patient name: Esperanza Colvin Start of Care: 2019   Referral source: Chela Bowens MD : 1935   Medical/Treatment Diagnosis: Unspecified abnormalities of gait and mobility [R26.9]  History of falling [Z91.81]  Payor: Shama Mcneal / Plan: Nayeli Torres / Product Type: [employer] Care Medicare /  Onset Date:Chronic, Worsening over last year     Prior Hospitalization: see medical history Provider#: [id number]   Medications: Verified on Patient Summary List    Comorbidities: Heart Disease, Coronary Bypass (), Replacement of Heart Valve (), Pacemaker   Prior Level of Function: (I) Functional ADLs, (I) Self-Care ADLs, Work Full-Time, Falls History, (I) Driving  Visits from [facility] Care: 15    Missed Visits: 0    The Plan of Care and following information is based on the patient's current status:    Short Term Goals: To be accomplished in 3-4 weeks:  1. Patient will subjectively report full compliance with prescribed HEP. Las tPN: Goal met: Pt reports performing HEP  2. Patient will demonstrate left/right ankle DF MMT >/= 4+/5 to improve safety with community ambulation. Last PN: Left Ankle DF MMT = 4/5, Right Ankle DF MMT = 4/5  At PN: Regressed: B ankle DF 3/5  3. Patient will demonstrate rhomberg (EC) >/= 20 seconds to improve safety with ambulation at nighttime. Last PN: Rhomberg (EC) = 3 sec  At PN: Progressing, Rhomberg (EC) = 10 sec     Long Term Goals: To be accomplished in 7-8 weeks:  1. Patient will demonstrate a significant functional improvement as demonstrated by a score of >/= 69 on FOTO. Last PN: FOTO = 45  At PN: Progressing: FOTO = 54  2. Patient will demonstrate TUG (10 feet, no AD) </= 13 seconds to demonstrate a reduced falls risk.   Last PN: TUG (10 feet, no AD) = 14 sec  At PN: Regressed: TUG (10 feet, no AD) = 14 sec  3. Patient will demonstrate rhomberg (EO, airex) >/= 20 seconds to demonstrate improved safety with ambulation on uneven surfaces. Last PN: Rhomberg (EO, Airex) = 5 sec (posterior LoB - verbal cuing required to anteriorly weightshift),  At PN: Goal met: Rhomberg (EO, airex) 25 sec    Key functional changes: See above goals. Problems/ barriers to goal attainment: None     Problem List: pain affecting function, decrease ROM, decrease strength, impaired gait/ balance, decrease ADL/ functional abilitiies, decrease activity tolerance, decrease flexibility/ joint mobility and decrease transfer abilities    Treatment Plan: Therapeutic exercise, Therapeutic activities, Neuromuscular re-education, Physical agent/modality, Gait/balance training, Manual therapy, Patient education, Self Care training, Functional mobility training, Home safety training and Stair training     Goals for this certification period: Continue with unmet goals above. Frequency / Duration: Patient to be seen 2 times per week for 4 weeks:    Assessment / Recommendations:    Pt reports 10-15% improvement since [facility] with subjective report of self-perceived improvement with stability with ambulation and self-balance confidence. Observationally patient noted to continue to demonstrate severe lack of balance self-confidence with secondarily apprehension with therapy discharge with therapist with belief that patient would benefit from continuation to ensure patient success post-discharge and reduce falls risk. Pt has objectively demonstrated an improvement in balance, as shown through testing, thus placing patient at a reduced falls risk. Patient continues to demonstrate poor foot clearance with poor ability to weightshift outside of base of support resulting in posterior loss of balance without self-correction.  With therapist recommendation for use of walker/rollator secondary to continued prominent balance deficits with patient not in agreement to utilize device with walking stick currently utilized primarily. With discussion with patient regarding plan to discharge following completion of remaining appointments with patient in agreement.      Patient will continue to benefit from skilled PT services to modify and progress therapeutic interventions, address functional mobility deficits, address ROM deficits, address strength deficits, analyze and address soft tissue restrictions and address imbalance/dizziness to attain remaining goals. Certification Period: 9/25/2019 - 10/24/2019    Suzanne Mosqueda, PT 9/25/2019 3:14 PM    ________________________________________________________________________  I certify that the above Therapy Services are being furnished while the patient is under my care. I agree with the treatment plan and certify that this therapy is necessary. [] I have read the above and request that my patient continue as recommended.   [] I have read the above report and request that my patient continue therapy with the following changes/special instructions: _______________________________________  [] I have read the above report and request that my patient be discharged from therapy    Physician's Signature:____________Date:_________TIME:________    ** Signature, Date and Time must be completed for valid certification **  Please sign and return to [facility] [address] [place], [address] [place]   [phone] fax

## 2019-10-01 ENCOUNTER — HOSPITAL ENCOUNTER (OUTPATIENT)
Dept: PHYSICAL THERAPY | Age: 84
Discharge: HOME | End: 2019-10-01
Payer: MEDICARE

## 2019-10-01 ENCOUNTER — OFFICE VISIT (OUTPATIENT)
Dept: PULMONOLOGY | Age: 84
End: 2019-10-01

## 2019-10-01 VITALS
BODY MASS INDEX: 27.52 KG/M2 | DIASTOLIC BLOOD PRESSURE: 72 MMHG | HEART RATE: 69 BPM | OXYGEN SATURATION: 96 % | TEMPERATURE: 97.9 F | HEIGHT: 71 IN | WEIGHT: 196.6 LBS | SYSTOLIC BLOOD PRESSURE: 129 MMHG | RESPIRATION RATE: 16 BRPM

## 2019-10-01 DIAGNOSIS — R94.2 ABNORMAL PULMONARY FUNCTION TEST: Primary | ICD-10-CM

## 2019-10-01 DIAGNOSIS — R94.2 ABNORMAL PULMONARY FUNCTION TEST: ICD-10-CM

## 2019-10-01 PROCEDURE — 97112 NEUROMUSCULAR REEDUCATION: CPT

## 2019-10-01 PROCEDURE — 97110 THERAPEUTIC EXERCISES: CPT

## 2019-10-01 RX ORDER — AMIODARONE HYDROCHLORIDE 200 MG/1
200 TABLET ORAL
COMMUNITY
Start: 2018-12-06

## 2019-10-01 RX ORDER — MUPIROCIN 20 MG/G
OINTMENT TOPICAL
COMMUNITY
Start: 2019-07-05 | End: 2019-10-01

## 2019-10-01 RX ORDER — OMEGA-3/DHA/EPA/FISH OIL 300-1000MG
2 CAPSULE,DELAYED RELEASE (ENTERIC COATED) ORAL DAILY
COMMUNITY

## 2019-10-01 NOTE — PROGRESS NOTES
PT DAILY TREATMENT NOTE 10-18    Patient Name: Merary Harper  Date:10/1/2019  : 1935  [x]  Patient  Verified  Payor: Andrea Gula / Plan: Via [employer] / Product Type: Managed Care Medicare /    In time:5:30  Out time:6:27  Total Treatment Time (min): 62  Visit #: 15 of 16    Medicare/BCBS Only   Total Timed Codes (min):  57 1:1 Treatment Time:  55       Treatment Area: Unspecified abnormalities of gait and mobility [R26.9]  History of falling [Z91.81]    SUBJECTIVE  Pain Level (0-10 scale): 0  Any medication changes, allergies to medications, adverse drug reactions, diagnosis change, or new procedure performed?: [x] No    [] Yes (see summary sheet for update)  Subjective functional status/changes:   [] No changes reported  Pt reports no change in anything since last session.      OBJECTIVE        Min Type Additional Details    [] Estim:  []Unatt       []IFC  []Premod                        []Other:  []w/ice   []w/heat  Position:  Location:    [] Estim: []Att    []TENS instruct  []NMES                    []Other:  []w/US   []w/ice   []w/heat  Position:  Location:    []  Traction: [] Cervical       []Lumbar                       [] Prone          []Supine                       []Intermittent   []Continuous Lbs:  [] before manual  [] after manual    []  Ultrasound: []Continuous   [] Pulsed                           []1MHz   []3MHz W/cm2:  Location:    []  Iontophoresis with dexamethasone         Location: [] Take home patch   [] In clinic    []  Ice     []  heat  []  Ice massage  []  Laser   []  Anodyne Position:  Location:    []  Laser with stim  []  Other:  Position:  Location:    []  Vasopneumatic Device Pressure:       [] lo [] med [] hi   Temperature: [] lo [] med [] hi   [] Skin assessment post-treatment:  []intact []redness- no adverse reaction    []redness  adverse reaction:           27 min Therapeutic Exercise:  [x] See flow sheet :   Rationale: increase ROM and increase strength to improve the patients ability to increase tolerance to activities.         30 min Neuromuscular Re-education:  [x]  See flow sheet :dynmaic gait and balance exercises.    Rationale: improve coordination, improve balance and increase proprioception  to improve the patients ability to increase ease with ADLs.                 With   [] TE   [] TA   [] neuro   [] other: Patient Education: [x] Review HEP    [] Progressed/Changed HEP based on:   [] positioning   [] body mechanics   [] transfers   [] heat/ice application    [] other:      Other Objective/Functional Measures: pt able to perform dynamic gait without HHA. Pain Level (0-10 scale) post treatment: 0    ASSESSMENT/Changes in Function: Pt had posterior lean LOB throughout session and required assist for correction. Patient will continue to benefit from skilled PT services to modify and progress therapeutic interventions, address functional mobility deficits, address ROM deficits, address strength deficits and analyze and address soft tissue restrictions to attain remaining goals. []  See Plan of Care  []  See progress note/recertification  []  See Discharge Summary         Progress towards goals / Updated goals:  Short Term Goals: To be accomplished in 3-4 weeks:  1. Patient will subjectively report full compliance with prescribed HEP. PN: Goal met: Pt reports performing HEP. 8/6/19  2. Patient will demonstrate left/right ankle DF MMT >/= 4+/5 to improve safety with community ambulation. PN: : B ankle DF 3/5. 9/24/19  3. Patient will demonstrate rhomberg (EC) >/= 20 seconds to improve safety with ambulation at nighttime. PN: Rhomberg (EC) = 10 sec. 9/24/2019     Long Term Goals: To be accomplished in 7-8 weeks:  1. Patient will demonstrate a significant functional improvement as demonstrated by a score of >/= 69 on FOTO. PN:  FOTO = 54. 9/24/19  2.  Patient will demonstrate TUG (10 feet, no AD) </= 13 seconds to demonstrate a reduced falls risk. PN:TUG (10 feet, no AD) = 14 sec. 9/24/19  3. Patient will demonstrate rhomberg (EO, airex) >/= 20 seconds to demonstrate improved safety with ambulation on uneven surfaces. PN:Goal met: Rhomberg (EO, airex) 25 sec.  9/17/19       PLAN  []  Upgrade activities as tolerated     [x]  Continue plan of care  []  Update interventions per flow sheet       []  Discharge due to:_  []  Other:_      Trevon Luna PTA 10/1/2019  5:32 PM    Future Appointments   Date Time Provider Valerio Banerjee   10/3/2019  5:30 PM Rocio Urena, PT [facility]   10/29/2019 11:00 AM [facility] [address]   10/29/2019 11:45 AM Shmuel Cheung MD [address]

## 2019-10-01 NOTE — PROGRESS NOTES
[facility]  Pulmonary, Critical Care, and Sleep Medicine      Dear Bala Neely,    Chief complaint:  Abnormal pulmonary function tests    HPI:  Katlin Leigh is 82years old and comes to the office today at your request concerning abnormal pulmonary function tests which were obtained when the patient began taking amiodarone. The patient relates that he has no significant shortness of breath and is able to walk up and down stairs at least 3 or 4 times a day without difficulty. He also denies chest pain significant leg swelling PND but does report a mild chronic cough which he attributes to his sinuses. He says that the mucus is clear without blood. Allergies   Allergen Reactions    Latex Unknown (comments)     \"skin irritation\"    Adhesive Unknown (comments)    Penicillins Other (comments)     Intolerance documented in [place], pt denies. Current Outpatient Medications   Medication Sig    amiodarone (CORDARONE) 200 mg tablet Take 200 mg by mouth.  fish oil- omega-3 fatty acids 300-1,000 mg capsule Take 2 Caps by mouth daily.  tamsulosin (FLOMAX) 0.4 mg capsule take 1 capsule by mouth once daily AFTER DINNER    warfarin (COUMADIN) 2 mg tablet 2mg every day except Tuesday and Thursday. 3mg Tuesday and Thursday. RESUME FROM 4/10/18 if no bleeding noted  Indications: Cerebral Thromboembolism Prevention    OTHER Check PT, INR on 4/11/18, results to cardiologist Dr Anant Beach immediately    OTHER Check CBC, CMP, Mg in 3 days, results to PCP immediately, Diagnosis - GI bleed    calcium-cholecalciferol, D3, (CALTRATE 600+D) tablet Take 1 Tab by mouth daily.  omeprazole (PRILOSEC) 20 mg capsule Take 20 mg by mouth daily.  allopurinol (ZYLOPRIM) 300 mg tablet Take 300 mg by mouth daily (after dinner).  atorvastatin (LIPITOR) 40 mg tablet Take 40 mg by mouth daily (after dinner).  captopril (CAPOTEN) 12.5 mg tablet Take 6.25 mg by mouth two (2) times a day.  1/2 tab BID    carvedilol (COREG) 3.125 mg tablet Take 3.125 mg by mouth two (2) times daily (with meals).  sertraline (ZOLOFT) 50 mg tablet take 1 tablet by mouth once daily    cholecalciferol (VITAMIN D3) 1,000 unit cap Take 1,000 Units by mouth daily.  ezetimibe (ZETIA) 10 mg tablet Take 10 mg by mouth daily.  FERROUS SULFATE PO Take 1 Tab by mouth two (2) times a day.  aspirin 81 mg tablet Take 81 mg by mouth.  mupirocin (BACTROBAN) 2 % ointment Apply  to affected area.  fluticasone (FLONASE) 50 mcg/actuation nasal spray instill 1 spray into each nostril twice a day    OMEGA-3 ACID ETHYL ESTERS (LOVAZA PO) Take  by mouth. 2 tabs in am and 1 tab at night    NIACIN by Does Not Apply route nightly. No current facility-administered medications for this visit. Past Medical History:   Diagnosis Date    Cancer [facility])     left kidney (patient states over 20 years ago)    Cataract 2007    Frequency     H/O kidney removal 1982    H/O skin graft 2007    Heart attack [facility]) [id number]    Heart attack ([address]     Heart valve replaced 2008    Kidney stones     Nocturia     Pacemaker 2008    UTI (urinary tract infection)    He denies a history of hypertension diabetes liver disease ulcers tuberculosis asthma or emphysema  Past Surgical History:   Procedure Laterality Date    COLONOSCOPY N/A 3/29/2018    COLONOSCOPY with polypectomies, polyp bx and clip x4 performed by Bubba Quispe MD at [address] [place] [address]  2011    HX HEART VALVE SURGERY  2008    HX NEPHRECTOMY Left     HX PACEMAKER  2008    SKIN GRAFT, HARVEST CULTURED TISSUE  2007       Family history: Heart disease       Social History: Smokes cigarettes but stopped over 50 years ago.   Is employed in [employer] business    Review of systems:  He denies fever chills poor appetite weight loss cold intolerance syncope focal muscle weakness or numbness seizures trouble hearing trouble seeing chronic abdominal pain melena blood in his stools dysuria hematuria rashes or arthritis but does have significant balance problems easy bruising and a chronic mild swallowing problem. He also relates over a year ago he developed rectal bleeding related to polyps which has not recurred but which caused significant anemia    Physical Exam:  Visit Vitals  /72 (BP 1 Location: Left arm, BP Patient Position: Sitting)   Pulse 69   Temp 97.9 °F (36.6 °C) (Oral)   Resp 16   Ht 5' 11\" (1.803 m)   Wt 89.2 kg (196 lb 9.6 oz)   SpO2 96%   BMI 27.42 kg/m²     Well-developed well-nourished  HEENT: pupils equal, reactive, sclera, non-icteric   Oropharynx tongue: normal   Neck: Supple   Lymph Nodes: Supra clavicular and cervical nodes, negative   Chest: Equal symmetrical expansion, no dullness, no wheezes, rales or rubs   Heart: Regular, rhythm without louise or murmur no carotid bruits  Abdomen: soft, non-tender no masses or organomegaly   Extremities: no cyanosis, clubbing, no edema no calf tenderness  Musculoskeletal: No acute joint inflammation or muscle wasting  Skin: No rash   Neurological: alert, oriented, moves all extremities      LABS:  O2 sat room air at rest 96%  Pulmonary function tests 7/9/2019: FEV1 percent 80% a diffusion capacity was not specified however in the interpretation it was noted that the diffusion capacity was significantly low.   Also there was a TLC which was noted to be 79% of predicted  Chest x-ray 5/21/2019: Some possible basilar atelectasis no other abnormalities    Impression:   Low diffusion capacity and slightly low TLC could suggest a restrictive lung disease due to amiodarone however the patient was anemic related to a previous GI bleed which could affect his diffusion capacity also he has no findings for shortness of breath on history or findings for restrictive lung defect caused by interstitial lung disease by physical exam or chest x-ray making the diagnosis of amiodarone toxicity unlikely however this should be confirmed. Plan:  Repeat lung volumes and diffusion capacity with hemoglobin  Repeat chest x-ray    Thanks for allowing me to share in this patient's evaluation    Sincerely,    Anthony Crawford MD , [facility]    CC: Marlene Goss MD    [address] [facility], [address] [address]     P: [phone]     F: [phone]

## 2019-10-01 NOTE — PROGRESS NOTES
Dami Bales presents today for   Chief Complaint   Patient presents with   [address] [place] [address] Referral / Consult     referred by Dr. Augustus Dupont for abnormal PFT done at [facility] 7/8/2019    Results     CXR 5/21/2019       Is someone accompanying this pt? No    Is the patient using any DME equipment during OV? Yes. cane    -DME Company N/A    Depression Screening:  3 most recent PHQ Screens 10/1/2019   Little interest or pleasure in doing things Not at all   Feeling down, depressed, irritable, or hopeless Not at all   Total Score PHQ 2 0       Learning Assessment:  No flowsheet data found. Abuse Screening:  Abuse Screening Questionnaire 10/1/2019   Do you ever feel afraid of your partner? N   Are you in a relationship with someone who physically or mentally threatens you? N   Is it safe for you to go home? Y       Fall Risk  Fall Risk Assessment, last 12 mths 10/1/2019   Able to walk? Yes   Fall in past 12 months? Yes   Fall with injury? Yes   Number of falls in past 12 months 2   Fall Risk Score 3         Coordination of Care:  1. Have you been to the ER, urgent care clinic since your last visit? Hospitalized since your last visit? No    2. Have you seen or consulted any other health care providers outside of the [address] [place] [address] since your last visit? Include any pap smears or colon screening. Yes.  Dr. Augustus Dupont, PCP

## 2019-10-03 ENCOUNTER — CLINICAL SUPPORT NO REQUIREMENTS (OUTPATIENT)
Dept: CARDIOLOGY | Facility: CLINIC | Age: 84
End: 2019-10-03

## 2019-10-03 ENCOUNTER — HOSPITAL ENCOUNTER (OUTPATIENT)
Dept: PHYSICAL THERAPY | Age: 84
Discharge: HOME | End: 2019-10-03
Payer: MEDICARE

## 2019-10-03 DIAGNOSIS — I48.91 ATRIAL FIBRILLATION, UNSPECIFIED TYPE (HCC): ICD-10-CM

## 2019-10-03 PROCEDURE — 93294 REM INTERROG EVL PM/LDLS PM: CPT | Performed by: INTERNAL MEDICINE

## 2019-10-03 PROCEDURE — 93296 REM INTERROG EVL PM/IDS: CPT | Performed by: INTERNAL MEDICINE

## 2019-10-03 PROCEDURE — 97110 THERAPEUTIC EXERCISES: CPT

## 2019-10-03 PROCEDURE — 97112 NEUROMUSCULAR REEDUCATION: CPT

## 2019-10-03 NOTE — PROGRESS NOTES
PT DAILY TREATMENT NOTE 10-18    Patient Name: Calin Suarez  Date:10/3/2019  : 1935  [x]  Patient  Verified  Payor: Grabiel Durbin / Plan: Stephen Ramsay / Product Type: Managed Care Medicare /    In time:525  Out time:623  Total Treatment Time (min): 58  Visit #: 17 of 16    Medicare/BCBS Only   Total Timed Codes (min):  58 1:1 Treatment Time:  53       Treatment Area: Unspecified abnormalities of gait and mobility [R26.9]  History of falling [Z91.81]    SUBJECTIVE  Pain Level (0-10 scale): 0  Any medication changes, allergies to medications, adverse drug reactions, diagnosis change, or new procedure performed?: [x] No    [] Yes (see summary sheet for update)  Subjective functional status/changes:   [] No changes reported  Patient denies falls. OBJECTIVE    28 min Therapeutic Exercise:  [x] See flow sheet : Emphasis placed on improving LE strength and AROM   Rationale: increase ROM and increase strength to improve the patients ability to improve ease with functional ADLs     30 min Neuromuscular Re-education:  [x]  See flow sheet : Emphasis placed on improving LE proprioceptive and kinesthetic awareness and static and dynamic balance   Rationale: increase ROM, increase strength, improve balance and increase proprioception  to improve the patients ability to improve safety with community ADLs        With   [] TE   [] TA   [] neuro   [] other: Patient Education: [x] Review HEP    [] Progressed/Changed HEP based on:   [] positioning   [] body mechanics   [] transfers   [] heat/ice application    [] other:      Other Objective/Functional Measures:   Rhomberg (EC) = 10 sec     Pain Level (0-10 scale) post treatment: 0    ASSESSMENT/Changes in Function: Improved foot clearance with dynamic gait with hurdles but continued requirement for CGA/min-assist secondary to imbalance without ability to self-correct.     Patient will continue to benefit from skilled PT services to address functional mobility deficits, address ROM deficits, address strength deficits, analyze and address soft tissue restrictions, analyze and cue movement patterns, analyze and modify body mechanics/ergonomics, assess and modify postural abnormalities, address imbalance/dizziness and instruct in home and community integration to attain remaining goals. []  See Plan of Care  []  See progress note/recertification  []  See Discharge Summary         Progress towards goals / Updated goals:    Short Term Goals: To be accomplished in 3-4 weeks:  1. Patient will subjectively report full compliance with prescribed HEP. PN: Goal met: Pt reports performing HEP. 8/6/19  2. Patient will demonstrate left/right ankle DF MMT >/= 4+/5 to improve safety with community ambulation. PN: : B ankle DF 3/5. 9/24/19  3. Patient will demonstrate rhomberg (EC) >/= 20 seconds to improve safety with ambulation at nighttime. PN: Rhomberg (EC) = 10 sec. 9/24/2019  Current: Remains, Rhomberg (EC) = 10 sec, 10/3/2019     Long Term Goals: To be accomplished in 7-8 weeks:  1. Patient will demonstrate a significant functional improvement as demonstrated by a score of >/= 69 on FOTO. PN:  FOTO = 54. 9/24/19  2. Patient will demonstrate TUG (10 feet, no AD) </= 13 seconds to demonstrate a reduced falls risk. PN:TUG (10 feet, no AD) = 14 sec. 9/24/19  3. Patient will demonstrate rhomberg (EO, airex) >/= 20 seconds to demonstrate improved safety with ambulation on uneven surfaces. PN:Goal met: Rhomberg (EO, airex) 25 sec.  9/17/19       PLAN  [x]  Upgrade activities as tolerated     [x]  Continue plan of care  []  Update interventions per flow sheet       []  Discharge due to:_  []  Other:_      Pawan Gallegos, PT 10/3/2019  8:05 AM    Future Appointments   Date Time Provider Valerio Banerjee   10/3/2019  5:30 PM Nancy Moody, PT [facility]   10/29/2019 11:00 AM [facility] [address]   10/29/2019 11:45 AM Jim Marr MD [address]

## 2019-10-21 ENCOUNTER — TELEPHONE (OUTPATIENT)
Dept: PULMONOLOGY | Age: 84
End: 2019-10-21

## 2019-10-21 NOTE — TELEPHONE ENCOUNTER
Left voicemail to remind patient to complete blood work and cxr prior to upcoming appt 10/29 that was ordered at last office visit 10/1.

## 2019-10-29 ENCOUNTER — OFFICE VISIT (OUTPATIENT)
Dept: PULMONOLOGY | Age: 84
End: 2019-10-29

## 2019-10-29 VITALS
HEIGHT: 71 IN | OXYGEN SATURATION: 96 % | RESPIRATION RATE: 18 BRPM | HEART RATE: 80 BPM | SYSTOLIC BLOOD PRESSURE: 116 MMHG | BODY MASS INDEX: 27.61 KG/M2 | WEIGHT: 197.2 LBS | DIASTOLIC BLOOD PRESSURE: 68 MMHG | TEMPERATURE: 97.4 F

## 2019-10-29 DIAGNOSIS — R94.2 ABNORMAL PULMONARY FUNCTION TEST: Primary | ICD-10-CM

## 2019-10-29 NOTE — PROGRESS NOTES
Sara Joyner presents today for   Chief Complaint   Patient presents with    Follow-up     labs done [place], CXR done        Is someone accompanying this pt? No     Is the patient using any DME equipment during OV? No     -DME Company n/a    Depression Screening:  3 most recent PHQ Screens 10/29/2019   Little interest or pleasure in doing things Not at all   Feeling down, depressed, irritable, or hopeless Not at all   Total Score PHQ 2 0       Learning Assessment:  Learning Assessment 10/1/2019   PRIMARY LEARNER Patient   PRIMARY LANGUAGE ENGLISH   LEARNER PREFERENCE PRIMARY DEMONSTRATION     READING   ANSWERED BY Patient   RELATIONSHIP SELF       Abuse Screening:  Abuse Screening Questionnaire 10/1/2019   Do you ever feel afraid of your partner? N   Are you in a relationship with someone who physically or mentally threatens you? N   Is it safe for you to go home? Y       Fall Risk  Fall Risk Assessment, last 12 mths 10/29/2019   Able to walk? Yes   Fall in past 12 months? Yes   Fall with injury? Yes   Number of falls in past 12 months 2   Fall Risk Score 3         Coordination of Care:  1. Have you been to the ER, urgent care clinic since your last visit? Hospitalized since your last visit? No    2. Have you seen or consulted any other health care providers outside of the [address] [place] [address] since your last visit? Include any pap smears or colon screening.  No

## 2019-10-29 NOTE — PROGRESS NOTES
[facility] SPECIAL[facility]  Pulmonary, Critical Care, and Sleep Medicine      Chief complaint:  Abnormal pulmonary function tests    HPI:    Edinson Plascencia    is 82years old and comes to the office today for follow-up concerning his abnormal pulmonary function test and relates that he still has no symptoms of shortness of breath including walking upstairs at home. He denies chest pain leg swelling chronic cough except for that related to a chronic postnasal drip. Allergies   Allergen Reactions    Latex Unknown (comments)     \"skin irritation\"    Adhesive Unknown (comments)    Penicillins Other (comments)     Intolerance documented in [place], pt denies. Current Outpatient Medications   Medication Sig    tamsulosin (FLOMAX) 0.4 mg capsule Take 1 Cap by mouth daily (after dinner).  amiodarone (CORDARONE) 200 mg tablet Take 200 mg by mouth.  fish oil- omega-3 fatty acids 300-1,000 mg capsule Take 2 Caps by mouth daily.  tamsulosin (FLOMAX) 0.4 mg capsule take 1 capsule by mouth once daily AFTER DINNER    warfarin (COUMADIN) 2 mg tablet 2mg every day except Tuesday and Thursday. 3mg Tuesday and Thursday. RESUME FROM 4/10/18 if no bleeding noted  Indications: Cerebral Thromboembolism Prevention    calcium-cholecalciferol, D3, (CALTRATE 600+D) tablet Take 1 Tab by mouth daily.  omeprazole (PRILOSEC) 20 mg capsule Take 20 mg by mouth daily.  allopurinol (ZYLOPRIM) 300 mg tablet Take 300 mg by mouth daily (after dinner).  atorvastatin (LIPITOR) 40 mg tablet Take 40 mg by mouth daily (after dinner).  captopril (CAPOTEN) 12.5 mg tablet Take 6.25 mg by mouth two (2) times a day. 1/2 tab BID    carvedilol (COREG) 3.125 mg tablet Take 3.125 mg by mouth two (2) times daily (with meals).  sertraline (ZOLOFT) 50 mg tablet take 1 tablet by mouth once daily    cholecalciferol (VITAMIN D3) 1,000 unit cap Take 1,000 Units by mouth daily.     ezetimibe (ZETIA) 10 mg tablet Take 10 mg by mouth daily.  FERROUS SULFATE PO Take 1 Tab by mouth two (2) times a day.  aspirin 81 mg tablet Take 81 mg by mouth.  OTHER Check PT, INR on 4/11/18, results to cardiologist Dr Luz Marina Lewis immediately    OTHER Check CBC, CMP, Mg in 3 days, results to PCP immediately, Diagnosis - GI bleed    NIACIN by Does Not Apply route nightly. No current facility-administered medications for this visit.       Past Medical History:   Diagnosis Date    Cancer [facility])     left kidney (patient states over 20 years ago)    Cataract 2007    Frequency     H/O kidney removal 1982    H/O skin graft 2007    Heart attack [facility]) [id number]    Heart attack ([address]     Heart valve replaced 2008    Kidney stones     Nocturia     Pacemaker 2008    UTI (urinary tract infection)      Past Surgical History:   Procedure Laterality Date    COLONOSCOPY N/A 3/29/2018    COLONOSCOPY with polypectomies, polyp bx and clip x4 performed by Cortes Arambula MD at [address]  2011    HX HEART VALVE SURGERY  2008    HX NEPHRECTOMY Left     HX PACEMAKER  2008    SKIN GRAFT, HARVEST CULTURED TISSUE  2007     Social History     Socioeconomic History    Marital status:      Spouse name: Not on file    Number of children: Not on file    Years of education: Not on file    Highest education level: Not on file   Occupational History    Not on file   Social Needs    Financial resource strain: Not on file    Food insecurity:     Worry: Not on file     Inability: Not on file    Transportation needs:     Medical: Not on file     Non-medical: Not on file   Tobacco Use    Smoking status: Former Smoker    Smokeless tobacco: Never Used    Tobacco comment: quit in 1975   Substance and Sexual Activity    Alcohol use: No    Drug use: No    Sexual activity: Not on file   Lifestyle    Physical activity:     Days per week: Not on file     Minutes per session: Not on file    Stress: Not on file Relationships    Social connections:     Talks on phone: Not on file     Gets together: Not on file     Attends [employer] service: Not on file     Active member of club or organization: Not on file     Attends meetings of clubs or organizations: Not on file     Relationship status: Not on file    Intimate partner violence:     Fear of current or ex partner: Not on file     Emotionally abused: Not on file     Physically abused: Not on file     Forced sexual activity: Not on file   Other Topics Concern    Not on file   Social History Narrative    Not on file     Family History   Problem Relation Age of Onset    Hypertension Mother     Cancer Brother        Review of systems:  He denies fever chills poor appetite weight loss    Physical Exam:  Visit Vitals  /68 (BP 1 Location: Right arm, BP Patient Position: Sitting)   Pulse 80   Temp 97.4 °F (36.3 °C) (Oral)   Resp 18   Ht 5' 11\" (1.803 m)   Wt 89.4 kg (197 lb 3.2 oz)   SpO2 96%   BMI 27.50 kg/m²       Well-developed well-nourished  HEENT: WNL  Lymph node exam: Supraclavicular cervical lymph nodes negative  Chest: Equal symmetrical expansion no dullness and absence of wheezes rales rubs  Heart: Regular rhythm no gallop no murmur no JVD no peripheral edema  Extremities: No cyanosis clubbing or calf tenderness  Neurological: Alert and oriented    Labs:    O2 sat room air at rest 96%  Chest x-ray 10/22/2019, personally reviewed with what appears to be bilateral lower lung zone interstitial infiltrate  Impression:     Patient on amiodarone with abnormal pulmonary function tests as well as an abnormal x-ray needs further evaluation    Plan:     Repeat lung volumes and diffusion  High-res CT of the chest to fully evaluate for interstitial lung disease and uses a baseline if required  Follow-up in approximately 4 weeks    Rebecca Flores MD , [facility]    CC: Roberto Robbins MD     [address] [facility] [address], [address] [address]     P: [phone]     F: [phone]

## 2019-11-06 ENCOUNTER — HOSPITAL ENCOUNTER (OUTPATIENT)
Dept: CT IMAGING | Age: 84
Discharge: HOME | End: 2019-11-06
Attending: INTERNAL MEDICINE
Payer: MEDICARE

## 2019-11-06 DIAGNOSIS — R94.2 ABNORMAL PULMONARY FUNCTION TEST: ICD-10-CM

## 2019-11-06 PROCEDURE — 71250 CT THORAX DX C-: CPT

## 2019-11-08 DIAGNOSIS — R94.2 ABNORMAL PULMONARY FUNCTION TEST: ICD-10-CM

## 2019-11-12 ENCOUNTER — TELEPHONE (OUTPATIENT)
Dept: PULMONOLOGY | Age: 84
End: 2019-11-12

## 2019-11-12 NOTE — PROGRESS NOTES
[facility]              [address]        [place], [address]   [phone] fax      Discharge Summary  Patient name: Radha Alonso Start of Care: 2019   Referral source: Pepper Hunt MD : [id number]   Medical/Treatment Diagnosis: Unsteadiness on feet [R26.81]  History of falling [Z91.81]  Payor: Kirit Bah / Plan: Caroline Page / Product Type: [employer] Medicare /  Onset Date:Chronic, Worsening over last year     Prior Hospitalization: see medical history Provider#: [id number]   Medications: Verified on Patient Summary List    Comorbidities: Heart Disease, Coronary Bypass (), Replacement of Heart Valve (), Pacemaker   Prior Level of Function: (I) Functional ADLs, (I) Self-Care ADLs, Work Full-Time, Falls History, (I) Driving  Visits from [place] of Care: 17    Missed Visits: 0  Reporting Period : 2019 to 10/3/2019      Summary of Care:    Short Term Goals: To be accomplished in 3-4 weeks:  1. Patient will subjectively report full compliance with prescribed HEP. PN: Goal met: Pt reports performing HEP  2. Patient will demonstrate left/right ankle DF MMT >/= 4+/5 to improve safety with community ambulation. PN: B Ankle DF 3/5  At DC: Inability to assess secondary to non-attendance  3. Patient will demonstrate rhomberg (EC) >/= 20 seconds to improve safety with ambulation at nighttime. PN: Rhomberg (EC) = 10 sec  At DC: Remains, Rhomberg (EC) = 10 sec     Long Term Goals: To be accomplished in 7-8 weeks:  1. Patient will demonstrate a significant functional improvement as demonstrated by a score of >/= 69 on FOTO. PN:  FOTO = 47  At DC: Inability to assess secondary to non-attendance  2. Patient will demonstrate TUG (10 feet, no AD) </= 13 seconds to demonstrate a reduced falls risk. PN:TUG (10 feet, no AD) = 14 sec  At DC: Inability to assess secondary to non-attendance  3.  Patient will demonstrate rhomberg (EO, airex) >/= 20 seconds to demonstrate improved safety with ambulation on uneven surfaces. PN:Goal met: Rhomberg (EO, airex) 25 sec    ASSESSMENT/RECOMMENDATIONS:    At this time patient to be discharged in accordance with clinic 30 day policy with patient having last been seen within clinic 10/3/2019. With temporary cessation of therapeutic services secondary to awaiting physician signature to allow for continuation of PT services with signature not received within 30 day period thus in accordance with MD discretion therapy services not continued.     [x]Discontinue therapy: []Patient has reached or is progressing toward set goals      []Patient is non-compliant or has abdicated      [x]Due to lack of appreciable progress towards set 55 Ambrose Mcmahon, PT 11/12/2019 12:56 PM

## 2019-11-13 NOTE — TELEPHONE ENCOUNTER
Per verbal order from Dr. Gold Cam. Labs about the same. Mild anemia. Pt followed by PCP Dr. Jae Bello and states he has appt next month with him.

## 2019-11-25 ENCOUNTER — OFFICE VISIT (OUTPATIENT)
Dept: PULMONOLOGY | Age: 84
End: 2019-11-25

## 2019-11-25 VITALS
HEART RATE: 78 BPM | DIASTOLIC BLOOD PRESSURE: 68 MMHG | HEIGHT: 71 IN | OXYGEN SATURATION: 96 % | TEMPERATURE: 97.9 F | WEIGHT: 198 LBS | RESPIRATION RATE: 19 BRPM | BODY MASS INDEX: 27.72 KG/M2 | SYSTOLIC BLOOD PRESSURE: 120 MMHG

## 2019-11-25 DIAGNOSIS — R94.2 ABNORMAL PULMONARY FUNCTION TEST: Primary | ICD-10-CM

## 2019-11-25 NOTE — PROGRESS NOTES
[facility]  Pulmonary, Critical Care, and Sleep Medicine      Chief complaint:  Abnormal pulmonary function test    HPI:    Farhad Romero    is 82years old and comes to the office today for follow-up concerning an abnormal diffusion capacity. The patient relates that he continues to have a sedentary lifestyle but is active in going to work every day and walks around his house for instance in his office without any dyspnea. Poor balance seems to limit his activity more than anything else. He denies chest pain chronic cough significant leg swelling      Allergies   Allergen Reactions    Latex Unknown (comments)     \"skin irritation\"    Adhesive Unknown (comments)    Penicillins Other (comments)     Intolerance documented in [place], pt denies. Current Outpatient Medications   Medication Sig    tamsulosin (FLOMAX) 0.4 mg capsule Take 1 Cap by mouth daily (after dinner).  amiodarone (CORDARONE) 200 mg tablet Take 200 mg by mouth.  fish oil- omega-3 fatty acids 300-1,000 mg capsule Take 2 Caps by mouth daily.  tamsulosin (FLOMAX) 0.4 mg capsule take 1 capsule by mouth once daily AFTER DINNER    warfarin (COUMADIN) 2 mg tablet 2mg every day except Tuesday and Thursday. 3mg Tuesday and Thursday. RESUME FROM 4/10/18 if no bleeding noted  Indications: Cerebral Thromboembolism Prevention    calcium-cholecalciferol, D3, (CALTRATE 600+D) tablet Take 1 Tab by mouth daily.  omeprazole (PRILOSEC) 20 mg capsule Take 20 mg by mouth daily.  allopurinol (ZYLOPRIM) 300 mg tablet Take 300 mg by mouth daily (after dinner).  atorvastatin (LIPITOR) 40 mg tablet Take 40 mg by mouth daily (after dinner).  captopril (CAPOTEN) 12.5 mg tablet Take 6.25 mg by mouth two (2) times a day. 1/2 tab BID    carvedilol (COREG) 3.125 mg tablet Take 3.125 mg by mouth two (2) times daily (with meals).     sertraline (ZOLOFT) 50 mg tablet take 1 tablet by mouth once daily    cholecalciferol (VITAMIN D3) 1,000 unit cap Take 1,000 Units by mouth daily.  ezetimibe (ZETIA) 10 mg tablet Take 10 mg by mouth daily.  FERROUS SULFATE PO Take 1 Tab by mouth two (2) times a day.  aspirin 81 mg tablet Take 81 mg by mouth.  OTHER Check PT, INR on 4/11/18, results to cardiologist Dr Stephanie Aguila immediately    OTHER Check CBC, CMP, Mg in 3 days, results to PCP immediately, Diagnosis - GI bleed    NIACIN by Does Not Apply route nightly. No current facility-administered medications for this visit.       Past Medical History:   Diagnosis Date    Cancer [facility])     left kidney (patient states over 20 years ago)    Cataract 2007    Frequency     H/O kidney removal 1982    H/O skin graft 2007    Heart attack [facility]) [id number]    Heart attack ([facility] [address]     Heart valve replaced 2008    Kidney stones     Nocturia     Pacemaker 2008    UTI (urinary tract infection)      Past Surgical History:   Procedure Laterality Date    COLONOSCOPY N/A 3/29/2018    COLONOSCOPY with polypectomies, polyp bx and clip x4 performed by Yared Andrews MD at [address] [place] [address]  2011    HX HEART VALVE SURGERY  2008    HX NEPHRECTOMY Left     HX PACEMAKER  2008    SKIN GRAFT, HARVEST CULTURED TISSUE  2007     Social History     Socioeconomic History    Marital status:      Spouse name: Not on file    Number of children: Not on file    Years of education: Not on file    Highest education level: Not on file   Occupational History    Not on file   Social Needs    Financial resource strain: Not on file    Food insecurity:     Worry: Not on file     Inability: Not on file    Transportation needs:     Medical: Not on file     Non-medical: Not on file   Tobacco Use    Smoking status: Former Smoker    Smokeless tobacco: Never Used    Tobacco comment: quit in 1975   Substance and Sexual Activity    Alcohol use: No    Drug use: No    Sexual activity: Not on file   Lifestyle    Physical activity:     Days per week: Not on file     Minutes per session: Not on file    Stress: Not on file   Relationships    Social connections:     Talks on phone: Not on file     Gets together: Not on file     Attends Shinto service: Not on file     Active member of club or organization: Not on file     Attends meetings of clubs or organizations: Not on file     Relationship status: Not on file    Intimate partner violence:     Fear of current or ex partner: Not on file     Emotionally abused: Not on file     Physically abused: Not on file     Forced sexual activity: Not on file   Other Topics Concern    Not on file   Social History Narrative    Not on file     Family History   Problem Relation Age of Onset    Hypertension Mother     Cancer Brother        Review of systems:  He denies fever chills poor appetite or weight loss    Physical Exam:  Visit Vitals  /68 (BP 1 Location: Left arm, BP Patient Position: At rest)   Pulse 78   Temp 97.9 °F (36.6 °C) (Oral)   Resp 19   Ht 5' 11\" (1.803 m)   Wt 89.8 kg (198 lb)   SpO2 96%   BMI 27.62 kg/m²       Well-developed well-nourished  HEENT: WNL  Lymph node exam: Supraclavicular cervical lymph nodes negative  Chest: Equal symmetrical expansion no dullness no wheezes rales rubs  Heart: Regular rhythm no gallop no murmur no JVD no peripheral edema  Extremities: No cyanosis clubbing or calf tenderness  Neurological: Alert and oriented    Labs:    O2 sat room air at rest 96%  Diffusion capacity 71% with a hemoglobin reported as 11.2 on 10/8/2019  Reduced vital capacity of 57 which indicates a loss of 170 cc since previous pulmonary function test 7/8/2019 with a FVC  CT of the chest high-res 11/6/2019 personally reviewed and small areas of groundglass infiltrate reported by radiology which I did not feel were significant and certainly were not an indication of a generalized reaction to amiodarone.   No other evidence of significant interstitial lung disease  Impression:     CT imaging with patchy groundglass infiltrate recorded by radiology but did not feel this was significant when I reviewed this personally and not compatible with drug reaction from amiodarone. Also persistent reduction in diffusion capacity the patient does have a mild anemia which could affect the results of the diffusion capacity. There is no suggestion of a widespread or aggressive interstitial pneumonitis due to a drug reaction or any other cause and from history and physical exam the patient appears stable    Plan:     Continue amiodarone if indicated with follow-up in 6 months unless symptoms change at that time we will do a diffusion capacity    Ronny Chavez MD , [facility]    CC: MD Angel Smith MD    [address] [facility] [address]  [facility], [address],[place]     P: [phone]     F: [phone]

## 2019-11-25 NOTE — PROGRESS NOTES
The pt. Denies resp. Symptoms. He states he is here to check if Amiodarone is causing resp. Function problems. Ramone Watson presents today for   Chief Complaint   Patient presents with    Breathing Problem     B/U to 10/29 CT 11/6 PFT today       Is someone accompanying this pt? No    Is the patient using any DME equipment during OV? No   -DME Company N/A    Depression Screening:  3 most recent PHQ Screens 10/29/2019   Little interest or pleasure in doing things Not at all   Feeling down, depressed, irritable, or hopeless Not at all   Total Score PHQ 2 0       Learning Assessment:  Learning Assessment 10/1/2019   PRIMARY LEARNER Patient   PRIMARY LANGUAGE ENGLISH   LEARNER PREFERENCE PRIMARY DEMONSTRATION     READING   ANSWERED BY Patient   RELATIONSHIP SELF       Abuse Screening:  Abuse Screening Questionnaire 10/1/2019   Do you ever feel afraid of your partner? N   Are you in a relationship with someone who physically or mentally threatens you? N   Is it safe for you to go home? Y       Fall Risk  Fall Risk Assessment, last 12 mths 11/25/2019   Able to walk? Yes   Fall in past 12 months? Yes   Fall with injury? Yes   Number of falls in past 12 months 2   Fall Risk Score 3         Coordination of Care:  1. Have you been to the ER, urgent care clinic since your last visit? Hospitalized since your last visit? No    2. Have you seen or consulted any other health care providers outside of the [address] [place] [address] since your last visit? Percy Betancourt, PCP, Dr. Michelle Rhodes (C)  Dr. Keysha Hollis (C)    Medication variance in dosage/sig per patient as follows: Per med. rec.

## 2020-01-02 ENCOUNTER — CLINICAL SUPPORT NO REQUIREMENTS (OUTPATIENT)
Dept: CARDIOLOGY | Facility: CLINIC | Age: 85
End: 2020-01-02

## 2020-01-02 DIAGNOSIS — I49.5 SICK SINUS SYNDROME (HCC): ICD-10-CM

## 2020-01-02 PROCEDURE — 93294 REM INTERROG EVL PM/LDLS PM: CPT | Performed by: INTERNAL MEDICINE

## 2020-01-02 PROCEDURE — 93296 REM INTERROG EVL PM/IDS: CPT | Performed by: INTERNAL MEDICINE

## 2020-04-02 ENCOUNTER — CLINICAL SUPPORT NO REQUIREMENTS (OUTPATIENT)
Dept: CARDIOLOGY | Facility: CLINIC | Age: 85
End: 2020-04-02

## 2020-04-02 DIAGNOSIS — I48.91 ATRIAL FIBRILLATION, UNSPECIFIED TYPE (HCC): ICD-10-CM

## 2020-04-02 PROCEDURE — 93296 REM INTERROG EVL PM/IDS: CPT | Performed by: INTERNAL MEDICINE

## 2020-04-02 PROCEDURE — 93294 REM INTERROG EVL PM/LDLS PM: CPT | Performed by: INTERNAL MEDICINE

## 2020-04-22 NOTE — PROGRESS NOTES
Subjective:     Encounter Date:04/27/2020      Patient ID: Micha Crockett is a 85 y.o.  white male, a , from [place], [place].       PHYSICIAN: Anthony Fried MD  PREVIOUS CARDIOLOGIST: Gypsy Gibbs MD  ELECTROPHYSIOLOGIST: Luc Lange MD, [facility], [facility]  ORTHOPEDIST: Vitaly Omer MD  SLEEP MD:  Gypsy Gibbs MD .    Chief Complaint:   Chief Complaint   Patient presents with   • Atrial Fibrillation     f/u     Problem List:  1. Remote paroxysmal atrial fibrillation with now chronic sustained atrial fibrillation and progressive sick sinus syndrome:  a. Diagnosed in [facility], in April 2014.   b. CHADS-VASc score of 4 with anticoagulation on Xarelto.  c. Successful cardioversion with restoration of sinus rhythm with advanced trifascicular block with CA interval of 292 msec in the setting of CRBB/LAHB, on 04/29/2014, by Dr. Slaughter.   d. Return of atrial fibrillation with Holter monitor.  e. Holter monitor for 48 hours with a minimum heart rate of 42 beats per minute and max of 138 beats per minute, with ventricular ectopy less than 1% of the time and supraventricular ectopy 2% of the time, 03/27/2015.  f. Echocardiogram showing estimated EF of 61% with mild-to-moderate LVH and left atrium slightly dilated, and right ventricle slightly dilated, and moderate aortic cuff sclerosis, and mild aortic regurgitation with mild mitral regurgitation, and mild-to-moderate tricuspid regurgitation.  g. Implantation of [employer] single-chamber permanent pacemaker on 07/13/2015 by Dr. Cano, with acceptable interrogation and no reprogramming, April 2017, with subsequent acceptable remote check, August 2017, November 2018, with acceptable interrogation, January 2019, July 2019.  h. Recent NYHA class II-III exertional dyspnea and fatigue without angina pectoris with abnormal echocardiogram (May 2019) and subsequent EP assessment and discontinuation of  Bystolic  i. Residual class I symptoms, August 2019, April 2020  2. Syncopal episodes secondary to bradycardia.   3. Hypertension.   4. Dyslipidemia.   5. Diabetes mellitus type 2, hemoglobin  A1c 6.7%, July 2015; 6.6%, December 2018  6. Chronic right bundle branch block.   7. Chronic diastolic heart failure.  8. Degenerative joint disease.   9. Ventral hernia.   10. Chronic kidney disease.   11. Obstructive sleep apnea with the use of CPAP.   12. Surgical history:  a. Appendectomy.   b. Pilonidal cyst.  c. 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  d. Prostatectomy.   13. Recent progressive dysphoria, weakness, exercise intolerance, and fatigue, off CPAP therapy, with recently completed polysomnogram and laboratory studies - data deficit, July 2019    Allergies   Allergen Reactions   • Bystolic [Nebivolol Hcl] Other (See Comments)     Fatigue, Couldn't sleep          Current Outpatient Medications:   •  aspirin 81 MG tablet, Take 81 mg by mouth Daily., Disp: , Rfl:   •  benazepril (LOTENSIN) 20 MG tablet, Take 20 mg by mouth Daily., Disp: , Rfl: 1  •  carvedilol (COREG) 6.25 MG tablet, Take 6.25 mg by mouth 2 (Two) Times a Day With Meals., Disp: , Rfl:   •  fenofibrate 160 MG tablet, Take 160 mg by mouth Daily., Disp: , Rfl: 0  •  hydrochlorothiazide (HYDRODIURIL) 25 MG tablet, Take 12.5 mg by mouth Daily., Disp: , Rfl: 1  •  metFORMIN XR (GLUCOPHAGE-XR) 500 MG 24 hr tablet, Take 500 mg by mouth Daily., Disp: , Rfl: 1  •  pravastatin (PRAVACHOL) 20 MG tablet, Take 20 mg by mouth Daily., Disp: , Rfl: 1  •  XARELTO 15 MG tablet, TAKE 1 TABLET BY MOUTH EVERY DAY WITH DINNER, Disp: 30 tablet, Rfl: 11    HISTORY OF PRESENT ILLNESS:  Patient is here for an 8-month follow-up via telephone visit.  The patient was supposed to have a same-day echocardiogram during this appointment but this was deferred in view of coronavirus. He has tried to stay quarantined. His wife has had a retinal detachment. He has not been  "overly active during the winter. The patient otherwise denies chest pain, shortness of breath, PND, edema, palpitations, syncope or presyncope at this time. He denies any hospitalizations or illnesses since he was last seen. He had a good birthday a couple months ago. 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 He fell and had a fractured orbit but denies any diplopia and is doing fine now; this occurred approximately 2 months ago and he did undergo outpatient medical evaluation and treatment; data deficit. 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  He has overall felt well but is been somewhat sedentary and active over the winter months particularly over the past 6 weeks.  No additional interim laboratory studies that he is aware of.      Review of Systems   All other systems reviewed and are negative.     All other systems reviewed and otherwise negative.    Procedures: NONE       Objective:       Vitals:    04/27/20 1019   BP: 147/89   BP Location: Right arm   Patient Position: Sitting   Pulse: 70   Weight: 93 kg (205 lb)   Height: 177.8 cm (70\")     Body mass index is 29.41 kg/m².  Last weight August 2019 was 189 pounds  Physical Exam   Physical exam not able to be performed due to telehealth visit in view of coronavirus.    Lab Review:   Lab Results   Component Value Date    GLUCOSE 107 (H) 07/15/2015    BUN 28 (H) 07/15/2015    CREATININE 1.4 (H) 07/15/2015    CO2 29 07/15/2015    CALCIUM 8.8 07/15/2015    ALBUMIN 4.0 07/12/2015    AST 17 07/12/2015    ALT 16 07/12/2015       Lab Results   Component Value Date    WBC 10.47 07/14/2015    HGB 12.0 (L) 07/14/2015    HCT 37.3 (L) 07/14/2015    MCV 86.9 07/14/2015     07/14/2015       Lab Results   Component Value Date    HGBA1C 6.7 (H) 07/12/2015       Lab Results   Component Value Date    TSH 3.548 07/12/2015       Lab Results   Component Value Date     (H) 07/12/2015     · Echocardiogram, May 2019, Dr. Gibbs ([place], [place])  · Moderate reduction " LVEF (0.40-0.45) with mild concentric LVH  · Moderate LAE  · Mild AI  · Moderate MR  · Mild ascending thoracic aortic root enlargement (41 mm)  · No PE    · Paceart interrogation 04/02/2020: 5-year battery longevity, ventricular pacing 99%, VVIR , 2 NS since last write up on 1/2/20    This patient has consented to a telehealth visit via telephone. The visit was scheduled as a telephone visit to comply with patient safety concerns in accordance with CDC recommendations.  All vitals recorded within this visit are reported by the patient.  I spent 25 minutes in total including but not limited to the 10 minutes spent in direct conversation with this patient.           Assessment:       Overall continued acceptable course with no new interim cardiopulmonary complaints with acceptable functional status. We will defer additional diagnostic or therapeutic intervention from a cardiac perspective at this time. He will have an echocardiogram same day as his next appointment.     Diagnosis Plan   1. Atrial fibrillation, unspecified type (CMS/HCC)  Stable, no recurrent atrial fibrillation symptoms, acceptable Paceart April 2020   2. Essential hypertension  Controlled, continue current cardiac medications    3. CANDY on CPAP  Encouraged compliance   4. Type 2 diabetes mellitus without complication, without long-term current use of insulin (CMS/HCC)  No new labs to review, increase physical activity and decrease carbs   5. Dyslipidemia  No new labs to review, continue pravastatin   6. Chronic diastolic heart failure:                                      Echocardiogram same day as next                                                                                                         appointment       Plan:         1. Patient to continue current medications and close follow up with the above providers.  2. Tentative cardiology follow up in September 2020 or patient may return sooner PRN.  3. Echocardiogram same day as next  appointment    Scribed for Daniel Slaughter MD by UGO Stroud. 4/27/2020  10:36     I, Daniel Slaughter MD, [facility], personally performed the services described in this documentation as scribed by the above named individual in my presence, and it is both accurate and complete. At 10:42 AM on 04/27/2020

## 2020-04-27 ENCOUNTER — OFFICE VISIT (OUTPATIENT)
Dept: CARDIOLOGY | Facility: CLINIC | Age: 85
End: 2020-04-27

## 2020-04-27 VITALS
BODY MASS INDEX: 29.35 KG/M2 | HEART RATE: 70 BPM | HEIGHT: 70 IN | SYSTOLIC BLOOD PRESSURE: 147 MMHG | WEIGHT: 205 LBS | DIASTOLIC BLOOD PRESSURE: 89 MMHG

## 2020-04-27 DIAGNOSIS — I48.91 ATRIAL FIBRILLATION, UNSPECIFIED TYPE (HCC): Primary | ICD-10-CM

## 2020-04-27 DIAGNOSIS — E78.5 DYSLIPIDEMIA: ICD-10-CM

## 2020-04-27 DIAGNOSIS — I10 ESSENTIAL HYPERTENSION: ICD-10-CM

## 2020-04-27 DIAGNOSIS — G47.33 OSA ON CPAP: ICD-10-CM

## 2020-04-27 DIAGNOSIS — Z99.89 OSA ON CPAP: ICD-10-CM

## 2020-04-27 DIAGNOSIS — E11.9 TYPE 2 DIABETES MELLITUS WITHOUT COMPLICATION, WITHOUT LONG-TERM CURRENT USE OF INSULIN (HCC): ICD-10-CM

## 2020-04-27 DIAGNOSIS — I50.33 ACUTE ON CHRONIC DIASTOLIC CHF (CONGESTIVE HEART FAILURE) (HCC): ICD-10-CM

## 2020-04-27 PROCEDURE — 99441 PR PHYS/QHP TELEPHONE EVALUATION 5-10 MIN: CPT | Performed by: INTERNAL MEDICINE

## 2020-05-06 ENCOUNTER — APPOINTMENT (OUTPATIENT)
Dept: CARDIOLOGY | Facility: HOSPITAL | Age: 85
End: 2020-05-06

## 2020-05-18 RX ORDER — RIVAROXABAN 15 MG/1
TABLET, FILM COATED ORAL
Qty: 90 TABLET | Refills: 3 | Status: SHIPPED | OUTPATIENT
Start: 2020-05-18 | End: 2021-05-26

## 2020-07-16 RX ORDER — CARVEDILOL 6.25 MG/1
TABLET ORAL
Qty: 180 TABLET | Refills: 3 | Status: SHIPPED | OUTPATIENT
Start: 2020-07-16 | End: 2021-07-26

## 2020-09-29 NOTE — PROGRESS NOTES
Subjective:     Encounter Date:09/30/2020    Patient ID: Micha Crockett is a 85 y.o.  white male, a / retired farmer, from [place], [place].       PHYSICIAN: Anthony Fried MD  PREVIOUS CARDIOLOGIST: Gypsy Gibbs MD  ELECTROPHYSIOLOGIST: Luc Lange MD, [facility], [facility]  ORTHOPEDIST: Vitaly Omer MD  SLEEP MD:  Gypsy Gibbs MD .    Chief Complaint:   Chief Complaint   Patient presents with   • Atrial Fibrillation     f/u       Problem List:  1. Remote paroxysmal atrial fibrillation with now chronic sustained atrial fibrillation and progressive sick sinus syndrome:  a. Diagnosed in [facility], in April 2014.   b. CHADS-VASc score of 4 with anticoagulation on Xarelto.  c. Successful cardioversion with restoration of sinus rhythm with advanced trifascicular block with CO interval of 292 msec in the setting of CRBB/LAHB, on 04/29/2014, by Dr. Slaughter.   d. Return of atrial fibrillation with Holter monitor.  e. Holter monitor for 48 hours with a minimum heart rate of 42 beats per minute and max of 138 beats per minute, with ventricular ectopy less than 1% of the time and supraventricular ectopy 2% of the time, 03/27/2015.  f. Echocardiogram showing estimated EF of 61% with mild-to-moderate LVH and left atrium slightly dilated, and right ventricle slightly dilated, and moderate aortic cuff sclerosis, and mild aortic regurgitation with mild mitral regurgitation, and mild-to-moderate tricuspid regurgitation.  g. Implantation of [employer] single-chamber permanent pacemaker on 07/13/2015 by Dr. Cano, with acceptable interrogation and no reprogramming, April 2017, with subsequent acceptable remote check, August 2017, November 2018, with acceptable interrogation, January 2019, July 2019.  h. Recent NYHA class II-III exertional dyspnea and fatigue without angina pectoris with abnormal echocardiogram (May 2019) and subsequent EP assessment and discontinuation  of Bystolic  i. Residual class I symptoms, August 2019, April 2020, September 2020  2. Syncopal episodes secondary to bradycardia.   3. Hypertension.   4. Dyslipidemia.   5. Diabetes mellitus type 2, hemoglobin  A1c 6.7%, July 2015; 6.6%, December 2018  6. Chronic right bundle branch block.   7. Chronic diastolic heart failure.  8. Degenerative joint disease.   9. Ventral hernia.   10. Chronic kidney disease.   11. Obstructive sleep apnea with the use of CPAP.   12. Surgical history:  a. Appendectomy.   b. Pilonidal cyst.  c. 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  d. Prostatectomy.   13. Recent progressive dysphoria, weakness, exercise intolerance, and fatigue, off CPAP therapy, with recently completed polysomnogram and laboratory studies - data deficit, July 2019    Allergies   Allergen Reactions   • Bystolic [Nebivolol Hcl] Other (See Comments)     Fatigue, Couldn't sleep        Current Outpatient Medications:   •  aspirin 81 MG tablet, Take 81 mg by mouth Daily., Disp: , Rfl:   •  benazepril (LOTENSIN) 20 MG tablet, Take 20 mg by mouth Daily., Disp: , Rfl: 1  •  carvedilol (COREG) 6.25 MG tablet, TAKE 1 TABLET BY MOUTH TWICE A DAY, Disp: 180 tablet, Rfl: 3  •  fenofibrate 160 MG tablet, Take 160 mg by mouth Daily., Disp: , Rfl: 0  •  hydrochlorothiazide (HYDRODIURIL) 25 MG tablet, Take 12.5 mg by mouth Daily., Disp: , Rfl: 1  •  metFORMIN XR (GLUCOPHAGE-XR) 500 MG 24 hr tablet, Take 500 mg by mouth Daily., Disp: , Rfl: 1  •  pravastatin (PRAVACHOL) 20 MG tablet, Take 20 mg by mouth Daily., Disp: , Rfl: 1  •  XARELTO 15 MG tablet, TAKE 1 TABLET BY MOUTH EVERY DAY WITH DINNER, Disp: 90 tablet, Rfl: 3  No current facility-administered medications for this visit.     History of Present Illness: Patient returns for scheduled 4-month follow up. The patient had an echocardiogram before coming to our office today; this will be read, and a letter will be sent to the patient with those results. Preliminary  "EF is 43%. Since last visit, patient states he has been doing \"fantastic\". He reports his blood pressure has never been as elevated as it is today and checks it daily although he cannot remember what it usually runs. He has been active and walks approximately 20-30 minutes daily uphill on a side street. He was walking twice a day but now with the cold weather he is not walking in the morning. He experiences no shortness of breath, chest pain, or chest pressure with exertion. 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 He has hired a company to remodel the bathroom and the kitchen. He has been sleeping less hours than he previously was but overall is sleeping well at night. His recent CPAP adjustment as been working better for him and he has asked if Dr. Gibbs could receive a copy of today's notes. He has had no interim ER visits, hospitalizations, serious illnesses, or surgeries. Patient otherwise denies chest pain, shortness of breath, PND, edema, palpitations, syncope, or presyncope at this time. Patient is encouraged to receive the influenza immunization and has questions regarding if he should wait until the 15th of October based off of information he has read online.         ROS   Obtained and negative except as outlined in problem list and HPI.    Procedures       Objective:       Vitals:    09/30/20 1102 09/30/20 1105 09/30/20 1115   BP: 170/91 (!) 142/105 155/74   BP Location: Left arm Left arm Left arm   Patient Position: Sitting Standing Sitting   Pulse: 81 89    Weight: 93 kg (205 lb)     Height: 177.8 cm (70\")       Body mass index is 29.41 kg/m².  Last weight: 205 lbs    Vitals signs reviewed.   Constitutional:       Appearance: Well-developed.   Neck:      Thyroid: No thyromegaly.      Vascular: No carotid bruit or JVD.      Lymphadenopathy: No cervical adenopathy.   Pulmonary:      Effort: Pulmonary effort is normal.      Breath sounds: Decreased breath sounds present. " No wheezing. No rhonchi. No rales.   Cardiovascular:      Regular rhythm.      Murmurs: There is a grade 2/6 mid frequency early systolic murmur at the LLSB.      No gallop. No S3 gallop.   Pulses:     Dorsalis pedis: 1+ bilaterally.     Posterior tibial: 1+ bilaterally.  Edema:     Peripheral edema absent.   Abdominal:      Palpations: Abdomen is soft. There is no abdominal mass.      Tenderness: There is no abdominal tenderness. There is no guarding.   Musculoskeletal: Normal range of motion.   Skin:     General: Skin is warm and dry.      Findings: No rash.   Neurological:      Mental Status: Alert and oriented to person, place, and time.           Lab Review: No recent lab results available for review.      Lab Results   Component Value Date    GLUCOSE 107 (H) 07/15/2015    BUN 28 (H) 07/15/2015    CREATININE 1.4 (H) 07/15/2015     07/15/2015    K 3.7 07/15/2015     07/15/2015    MG 2.2 07/12/2015    CO2 29 07/15/2015    CALCIUM 8.8 07/15/2015    ALBUMIN 4.0 07/12/2015    AST 17 07/12/2015    ALT 16 07/12/2015       Lab Results   Component Value Date    WBC 10.47 07/14/2015    HGB 12.0 (L) 07/14/2015    HCT 37.3 (L) 07/14/2015    MCV 86.9 07/14/2015     07/14/2015       Lab Results   Component Value Date    HGBA1C 6.7 (H) 07/12/2015       Lab Results   Component Value Date    TSH 3.548 07/12/2015             Assessment:       Overall continued acceptable course with no new interim cardiopulmonary complaints with acceptable functional status. We will defer additional diagnostic or therapeutic intervention from a cardiac perspective at this time other than to adjust his antihypertensive medication as outlined below in hopes of improving his systolic left ventricular function.     Diagnosis Plan   1. Atrial fibrillation, unspecified type (CMS/HCC)  No recurrence; Continue current treatment.    2. Essential hypertension  Suboptimal control; see below.    3. CANDY on CPAP  Compliance stressed   4.  Type 2 diabetes mellitus without complication, without long-term current use of insulin (CMS/[facility])  No new data to review; continue current treatment and close follow up and monitoring with Dr. Fried   5. Dyslipidemia  No new data to review; continue pravastatin   6. Chronic diastolic heart failure (CMS/[facility])  The patient had an echocardiogram before coming to our office today; this will be read, and a letter will be sent to the patient with those results.          Plan:         1. Patient to continue current medications and close follow up with the above providers with the increase in benazepril to 20 mg BID.  2. Tentative cardiology follow up in March 2021 or patient may return sooner PRN.  3. Influenza immunization encouraged.   4. Patient is to follow up with EP in the next 1-2 months and consider increasing carvedilol to 12.5 mg BID at that time if blood pressure will allow.  5. 1 800 card issued to patient.    Scribed for Daniel Slaughter MD by Monika Hendrix. 9/30/2020  11:36 EDT     I, Daniel Slaughter MD, [facility], personally performed the services described in this documentation as scribed by the above named individual in my presence, and it is both accurate and complete. At 11:19 EDT on 09/30/2020

## 2020-09-30 ENCOUNTER — HOSPITAL ENCOUNTER (OUTPATIENT)
Dept: CARDIOLOGY | Facility: HOSPITAL | Age: 85
Discharge: HOME | End: 2020-09-30
Admitting: NURSE PRACTITIONER

## 2020-09-30 ENCOUNTER — OFFICE VISIT (OUTPATIENT)
Dept: CARDIOLOGY | Facility: CLINIC | Age: 85
End: 2020-09-30

## 2020-09-30 VITALS — HEIGHT: 70 IN | BODY MASS INDEX: 29.35 KG/M2 | WEIGHT: 205 LBS

## 2020-09-30 VITALS
HEART RATE: 89 BPM | HEIGHT: 70 IN | SYSTOLIC BLOOD PRESSURE: 155 MMHG | WEIGHT: 205 LBS | BODY MASS INDEX: 29.35 KG/M2 | DIASTOLIC BLOOD PRESSURE: 74 MMHG

## 2020-09-30 DIAGNOSIS — E78.5 DYSLIPIDEMIA: ICD-10-CM

## 2020-09-30 DIAGNOSIS — E11.9 TYPE 2 DIABETES MELLITUS WITHOUT COMPLICATION, WITHOUT LONG-TERM CURRENT USE OF INSULIN (HCC): ICD-10-CM

## 2020-09-30 DIAGNOSIS — I50.33 ACUTE ON CHRONIC DIASTOLIC CHF (CONGESTIVE HEART FAILURE) (HCC): ICD-10-CM

## 2020-09-30 DIAGNOSIS — I50.32 CHRONIC DIASTOLIC HEART FAILURE (HCC): ICD-10-CM

## 2020-09-30 DIAGNOSIS — G47.33 OSA ON CPAP: ICD-10-CM

## 2020-09-30 DIAGNOSIS — I10 ESSENTIAL HYPERTENSION: ICD-10-CM

## 2020-09-30 DIAGNOSIS — I48.91 ATRIAL FIBRILLATION, UNSPECIFIED TYPE (HCC): Primary | ICD-10-CM

## 2020-09-30 DIAGNOSIS — Z99.89 OSA ON CPAP: ICD-10-CM

## 2020-09-30 LAB
ASCENDING AORTA: 4.1 CM
BH CV ECHO MEAS - AI DEC SLOPE: 91.9 CM/SEC^2
BH CV ECHO MEAS - AI MAX PG: 27.8 MMHG
BH CV ECHO MEAS - AI MAX VEL: 262.3 CM/SEC
BH CV ECHO MEAS - AI P1/2T: 836.1 MSEC
BH CV ECHO MEAS - AO MAX PG (FULL): 0.94 MMHG
BH CV ECHO MEAS - AO MAX PG: 3.1 MMHG
BH CV ECHO MEAS - AO MEAN PG (FULL): 0.46 MMHG
BH CV ECHO MEAS - AO MEAN PG: 1.7 MMHG
BH CV ECHO MEAS - AO ROOT AREA (BSA CORRECTED): 2
BH CV ECHO MEAS - AO ROOT AREA: 13.6 CM^2
BH CV ECHO MEAS - AO ROOT DIAM: 4.2 CM
BH CV ECHO MEAS - AO V2 MAX: 88.2 CM/SEC
BH CV ECHO MEAS - AO V2 MEAN: 64 CM/SEC
BH CV ECHO MEAS - AO V2 VTI: 20.2 CM
BH CV ECHO MEAS - ASC AORTA: 4.1 CM
BH CV ECHO MEAS - AVA(I,A): 2.7 CM^2
BH CV ECHO MEAS - AVA(I,D): 2.7 CM^2
BH CV ECHO MEAS - AVA(V,A): 3.2 CM^2
BH CV ECHO MEAS - AVA(V,D): 3.2 CM^2
BH CV ECHO MEAS - BSA(HAYCOCK): 2.2 M^2
BH CV ECHO MEAS - BSA: 2.1 M^2
BH CV ECHO MEAS - BZI_BMI: 29.4 KILOGRAMS/M^2
BH CV ECHO MEAS - BZI_METRIC_HEIGHT: 177.8 CM
BH CV ECHO MEAS - BZI_METRIC_WEIGHT: 93 KG
BH CV ECHO MEAS - EDV(CUBED): 36.8 ML
BH CV ECHO MEAS - EDV(MOD-SP2): 144 ML
BH CV ECHO MEAS - EDV(MOD-SP4): 146 ML
BH CV ECHO MEAS - EDV(TEICH): 45 ML
BH CV ECHO MEAS - EF(CUBED): 43.4 %
BH CV ECHO MEAS - EF(MOD-BP): 41 %
BH CV ECHO MEAS - EF(MOD-SP2): 45.8 %
BH CV ECHO MEAS - EF(MOD-SP4): 44.5 %
BH CV ECHO MEAS - EF(TEICH): 37.1 %
BH CV ECHO MEAS - ESV(CUBED): 20.8 ML
BH CV ECHO MEAS - ESV(MOD-SP2): 78 ML
BH CV ECHO MEAS - ESV(MOD-SP4): 81 ML
BH CV ECHO MEAS - ESV(TEICH): 28.3 ML
BH CV ECHO MEAS - FS: 17.3 %
BH CV ECHO MEAS - IVS/LVPW: 1.2
BH CV ECHO MEAS - IVSD: 1.7 CM
BH CV ECHO MEAS - LA DIMENSION: 4.8 CM
BH CV ECHO MEAS - LA/AO: 1.1
BH CV ECHO MEAS - LAD MAJOR: 7.7 CM
BH CV ECHO MEAS - LAT PEAK E' VEL: 10.7 CM/SEC
BH CV ECHO MEAS - LATERAL E/E' RATIO: 5.6
BH CV ECHO MEAS - LV DIASTOLIC VOL/BSA (35-75): 69.2 ML/M^2
BH CV ECHO MEAS - LV IVRT: 0.06 SEC
BH CV ECHO MEAS - LV MASS(C)D: 181.4 GRAMS
BH CV ECHO MEAS - LV MASS(C)DI: 86 GRAMS/M^2
BH CV ECHO MEAS - LV MAX PG: 2.2 MMHG
BH CV ECHO MEAS - LV MEAN PG: 1.2 MMHG
BH CV ECHO MEAS - LV SYSTOLIC VOL/BSA (12-30): 38.4 ML/M^2
BH CV ECHO MEAS - LV V1 MAX: 73.7 CM/SEC
BH CV ECHO MEAS - LV V1 MEAN: 50.7 CM/SEC
BH CV ECHO MEAS - LV V1 VTI: 14.4 CM
BH CV ECHO MEAS - LVIDD: 3.3 CM
BH CV ECHO MEAS - LVIDS: 2.8 CM
BH CV ECHO MEAS - LVLD AP2: 8.7 CM
BH CV ECHO MEAS - LVLD AP4: 8.8 CM
BH CV ECHO MEAS - LVLS AP2: 8 CM
BH CV ECHO MEAS - LVLS AP4: 7.9 CM
BH CV ECHO MEAS - LVOT AREA (M): 3.8 CM^2
BH CV ECHO MEAS - LVOT AREA: 3.8 CM^2
BH CV ECHO MEAS - LVOT DIAM: 2.2 CM
BH CV ECHO MEAS - LVPWD: 1.4 CM
BH CV ECHO MEAS - MED PEAK E' VEL: 5.6 CM/SEC
BH CV ECHO MEAS - MEDIAL E/E' RATIO: 10.8
BH CV ECHO MEAS - MR MAX PG: 103 MMHG
BH CV ECHO MEAS - MR MAX VEL: 504.9 CM/SEC
BH CV ECHO MEAS - MR MEAN PG: 66 MMHG
BH CV ECHO MEAS - MR MEAN VEL: 381.2 CM/SEC
BH CV ECHO MEAS - MR VTI: 183.9 CM
BH CV ECHO MEAS - MV A MAX VEL: 29.3 CM/SEC
BH CV ECHO MEAS - MV DEC SLOPE: 280.2 CM/SEC^2
BH CV ECHO MEAS - MV DEC TIME: 0.3 SEC
BH CV ECHO MEAS - MV E MAX VEL: 61.7 CM/SEC
BH CV ECHO MEAS - MV E/A: 2.1
BH CV ECHO MEAS - MV P1/2T MAX VEL: 83.4 CM/SEC
BH CV ECHO MEAS - MV P1/2T: 87.2 MSEC
BH CV ECHO MEAS - MVA P1/2T LCG: 2.6 CM^2
BH CV ECHO MEAS - MVA(P1/2T): 2.5 CM^2
BH CV ECHO MEAS - PA ACC SLOPE: 354.6 CM/SEC^2
BH CV ECHO MEAS - PA ACC TIME: 0.15 SEC
BH CV ECHO MEAS - PA MAX PG: 1.6 MMHG
BH CV ECHO MEAS - PA PR(ACCEL): 12.5 MMHG
BH CV ECHO MEAS - PA V2 MAX: 64 CM/SEC
BH CV ECHO MEAS - RAP SYSTOLE: 8 MMHG
BH CV ECHO MEAS - RVSP: 42 MMHG
BH CV ECHO MEAS - SI(AO): 130.1 ML/M^2
BH CV ECHO MEAS - SI(CUBED): 7.6 ML/M^2
BH CV ECHO MEAS - SI(LVOT): 26.1 ML/M^2
BH CV ECHO MEAS - SI(MOD-SP2): 31.3 ML/M^2
BH CV ECHO MEAS - SI(MOD-SP4): 30.8 ML/M^2
BH CV ECHO MEAS - SI(TEICH): 7.9 ML/M^2
BH CV ECHO MEAS - SV(AO): 274.5 ML
BH CV ECHO MEAS - SV(CUBED): 16 ML
BH CV ECHO MEAS - SV(LVOT): 55.2 ML
BH CV ECHO MEAS - SV(MOD-SP2): 66 ML
BH CV ECHO MEAS - SV(MOD-SP4): 65 ML
BH CV ECHO MEAS - SV(TEICH): 16.7 ML
BH CV ECHO MEAS - TAPSE (>1.6): 1.7 CM
BH CV ECHO MEAS - TR MAX PG: 34 MMHG
BH CV ECHO MEAS - TR MAX VEL: 290 CM/SEC
BH CV ECHO MEASUREMENTS AVERAGE E/E' RATIO: 7.57
BH CV VAS BP LEFT ARM: NORMAL MMHG
BH CV XLRA - RV BASE: 3.8 CM
BH CV XLRA - RV LENGTH: 6.7 CM
BH CV XLRA - RV MID: 2.2 CM
BH CV XLRA - TDI S': 7.11 CM/SEC
LEFT ATRIUM VOLUME INDEX: 44.6 ML/M^2
LEFT ATRIUM VOLUME: 94 ML
LV EF 2D ECHO EST: 39 %
MAXIMAL PREDICTED HEART RATE: 137 BPM
STRESS TARGET HR: 116 BPM

## 2020-09-30 PROCEDURE — 25010000002 SULFUR HEXAFLUORIDE MICROSPH 60.7-25 MG RECONSTITUTED SUSPENSION: Performed by: NURSE PRACTITIONER

## 2020-09-30 PROCEDURE — 99214 OFFICE O/P EST MOD 30 MIN: CPT | Performed by: INTERNAL MEDICINE

## 2020-09-30 PROCEDURE — 93306 TTE W/DOPPLER COMPLETE: CPT

## 2020-09-30 PROCEDURE — 93306 TTE W/DOPPLER COMPLETE: CPT | Performed by: INTERNAL MEDICINE

## 2020-09-30 RX ORDER — BENAZEPRIL HYDROCHLORIDE 20 MG/1
20 TABLET ORAL 2 TIMES DAILY
Qty: 180 TABLET | Refills: 3 | Status: SHIPPED | OUTPATIENT
Start: 2020-09-30 | End: 2021-07-02 | Stop reason: ALTCHOICE

## 2020-09-30 RX ADMIN — SULFUR HEXAFLUORIDE 3 ML: KIT at 10:57

## 2020-10-01 NOTE — PROGRESS NOTES
Problem: Self Care Deficits Care Plan (Adult)  Goal: *Acute Goals and Plan of Care (Insert Text)  Occupational Therapy Goals  Initiated 4/9/2018 within 7 day(s). 1.  Patient will perform upper body dressing with independence   2. Patient will perform lower body dressing with independence. 3.  Patient will perform grooming standing at sink >5 min with MOD independence. 4.  Patient will perform toilet transfers with modified independence. 5.  Patient will perform all aspects of toileting with modified independence. 6.  Patient will participate in upper extremity therapeutic exercise/activities with independence for 8 minutes. 7.  Patient will utilize energy conservation techniques during functional activities with verbal cues. Outcome: Progressing Towards Goal  Occupational Therapy EVALUATION    Patient: Gonzalo Mcneal (29 y.o. male)  Date: 4/9/2018  Primary Diagnosis: rectal bleeding and dizziness  BRBPR (bright red blood per rectum)  Lower GI bleed        Precautions:  Fall    ASSESSMENT :  Based on the objective data described below, the patient presents with decreased endurance and bilat UE strength which contributes to diminished ADL and related transfer independence. Pt is (I) no AD at baseline, lives with wife, 2 story house (can sleep on first floor). 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 Completed ADL with supervision/set up and fww today. Patient will benefit from skilled intervention to address the above impairments.   Patients rehabilitation potential is considered to be Good  Factors which may influence rehabilitation potential include:   []             None noted  []             Mental ability/status  []             Medical condition  []             Home/family situation and support systems  []             Safety awareness  []             Pain tolerance/management  []             Other:      PLAN :  Recommendations and Planned Interventions:  []               Self Care Training Refilled per protocol.    Routed staff message to PSAR pool to schedule an appointment with Dr. Fritz.       []        Therapeutic Activities  []               Functional Mobility Training    []        Cognitive Retraining  []               Therapeutic Exercises           []        Endurance Activities  []               Balance Training                   []        Neuromuscular Re-Education  []               Visual/Perceptual Training     []   Home Safety Training  []               Patient Education                 []        Family Training/Education  []               Other (comment):    Frequency/Duration: Patient will be followed by occupational therapy 1-2 times per day/4-7 days per week to address goals. Discharge Recommendations: Home Health  Further Equipment Recommendations for Discharge: N/A     Barriers to Learning/Limitations: None  Compensate with: visual, verbal, tactile, kinesthetic cues/model     PATIENT COMPLEXITY      Eval Complexity: History: LOW Complexity : Brief history review ; Examination: LOW Complexity : 1-3 performance deficits relating to physical, cognitive , or psychosocial skils that result in activity limitations and / or participation restrictions ; Decision Making:MEDIUM Complexity : Patient may present with comorbidities that affect occupational performnce. Miniml to moderate modification of tasks or assistance (eg, physical or verbal ) with assesment(s) is necessary to enable patient to complete evaluation  Assessment: low Complexity     G-CODES:     Self Care  Current  CI= 1-19%   Goal  CI= 1-19%. The severity rating is based on the Level of Assistance required for Functional Mobility and ADLs. SUBJECTIVE:   Patient stated I am familiar with rehab.     OBJECTIVE DATA SUMMARY:     Past Medical History:   Diagnosis Date    Cancer ([address]     left kidney (patient states over 20 years ago)    Cataract 2007    Frequency     H/O kidney removal 1982    H/O skin graft 2007    Heart attack ([address] [id number]    [facility] [address] [facility]     Heart valve replaced 2008    Kidney stones     Nocturia     Pacemaker 2008    UTI (urinary tract infection)      Past Surgical History:   Procedure Laterality Date    COLONOSCOPY N/A 3/29/2018    COLONOSCOPY with polypectomies, polyp bx and clip x4 performed by Abiodun Batista MD at [address]  2011    HX HEART VALVE SURGERY  2008    HX NEPHRECTOMY Left     HX PACEMAKER  2008    SKIN GRAFT, HARVEST CULTURED TISSUE  2007     Prior Level of Function/Home Situation: independent, 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  Home Situation  Home Environment: Private residence  # Steps to Enter: 2  One/Two Story Residence: Two story  # of Interior Steps:  (pt has flight to second floor bed, but can sleep first floor)  Height of Each Step (in): 7 inches  Interior Rails: Both  Lift Chair Available: No  Living Alone: No  Support Systems: Child(santino), Family member(s)  Patient Expects to be Discharged to[de-identified] Private residence  Current DME Used/Available at Home: Ernst Sherwin or Shower Type: Shower  []  Right hand dominant   []  Left hand dominant  Cognitive/Behavioral Status:  Neurologic State: Alert  Orientation Level: Oriented X4          Skin: no noted concern    Edema: no noted concern    Vision/Perceptual:    Tracking:  (no noted concern)                                Coordination:  Coordination: Generally decreased, functional (BUE)            Balance:   improved dynamic ADL balance with fww    Strength:    Strength: Generally decreased, functional (BUE)                Tone & Sensation:    Tone: Normal (BUE)  Sensation: Intact (BUE)                      Range of Motion:    AROM: Generally decreased, functional (BUE)  PROM: Generally decreased, functional (BUE)                      Functional Mobility and Transfers for ADLs:  Bed Mobility:     Supine to Sit: Supervision  Sit to Supine: Supervision  Scooting: Supervision  Transfers:  Sit to Stand: Contact guard assistance                Toilet Transfer : Stand-by assistance ADL Assessment:  Feeding: Setup    Oral Facial Hygiene/Grooming: Setup    Bathing: Contact guard assistance    Upper Body Dressing: Supervision    Lower Body Dressing: Stand-by assistance    Toileting: Supervision                Pain:  Pt reports 0/10 pain or discomfort prior to treatment.    Pt reports 0/10 pain or discomfort post treatment. Activity Tolerance:   good    Please refer to the flowsheet for vital signs taken during this treatment. After treatment:   [] Patient left in no apparent distress sitting up in chair  [x] Patient left in no apparent distress in bed  [x] Call bell left within reach  [x] Nursing notified  [] Caregiver present  [] Bed alarm activated    COMMUNICATION/EDUCATION:   [x] Home safety education was provided and the patient/caregiver indicated understanding. [x] Patient/family have participated as able in goal setting and plan of care. [] Patient/family agree to work toward stated goals and plan of care. [] Patient understands intent and goals of therapy, but is neutral about his/her participation. [] Patient is unable to participate in goal setting and plan of care.     Thank you for this referral.  Iesha Roblero OT  Time Calculation: 25 mins

## 2020-10-12 ENCOUNTER — TELEPHONE (OUTPATIENT)
Dept: CARDIOLOGY | Facility: CLINIC | Age: 85
End: 2020-10-12

## 2020-10-12 RX ORDER — AMLODIPINE BESYLATE 5 MG/1
5 TABLET ORAL NIGHTLY
Qty: 90 TABLET | Refills: 3 | Status: SHIPPED | OUTPATIENT
Start: 2020-10-12 | End: 2020-12-01

## 2020-10-12 RX ORDER — HYDROCHLOROTHIAZIDE 25 MG/1
25 TABLET ORAL DAILY
Qty: 90 TABLET | Refills: 1 | Status: SHIPPED | OUTPATIENT
Start: 2020-10-12 | End: 2020-12-01

## 2020-10-12 NOTE — TELEPHONE ENCOUNTER
Patient's wife called and stated that she has been checking patient's BP for the past 4 days and it has been ranging 164//100 HR 80-90.    Pt denies chest pain, SOB, swelling, dizziness, blurred vision, headache.      Pt currently taking:  Benazepril 20 mg BID  Cored 6.25 mg BID  HCTZ 12.5 mg daily  Xarelto 15 mg daily    Please advise.

## 2020-10-12 NOTE — TELEPHONE ENCOUNTER
Called pt's wife and gave KTS recommendations above. Pt's wife verbalizes understanding and agreeable to plan.

## 2020-10-12 NOTE — TELEPHONE ENCOUNTER
Noted; would increase HCTZ to 25 mg daily and add amlodipine 5 mg at bedtime daily and assess blood pressure 1-2 times daily for the next 1 week and update us with readings; if blood pressure is controlled at that time he will need to have a BMP/magnesium determination.      Thanks!

## 2020-10-23 NOTE — TELEPHONE ENCOUNTER
Called pt's wifeVioleta and gave KTS recommendations above. Pt's wife verbalizes understanding and agreeable to plan.

## 2020-10-23 NOTE — TELEPHONE ENCOUNTER
"Noted; his blood pressure readings are certainly not low.  Completely perplexed why he complains of \"extreme fatigue\" when he was doing well less than a month ago.  He may need to have a CBC or BMP if lab has not been performed recently.  He needs to walk daily as he previously was doing quite well.  I would continue current treatment at this time and monitor for peripheral edema, significant weight gain, productive cough, fever, chills, or additional constitutional complaints.  His cardiac status should be stable on current regimen.    Thanks!  "

## 2020-10-23 NOTE — TELEPHONE ENCOUNTER
Pt's wife called to update BP readings.     10/15  158/89  84  10/16  159/84  74   154/80  90  10/17  160/84  74   154/80  90  10/19  151/82  69   148/85  71  10/20  160/90  74   154/70  90  10/21  154/82  72   154/80  74  10/22  134/76  69   140/84  70  10/23  142/70  72    Patient complains of extreme fatigue. Denies chest pain, SOB, dizziness, swelling, weight gain.     Pt currently taking:  Benazepril 20 mg BID  Coreg 6.25 mg BID  HCTZ 12.5 mg daily  amlodipine 5 mg  Xarelto 15 mg daily    Patient's wife did not increase HCTZ on 10/12/20 due to confusion.     Please advise.

## 2020-12-01 ENCOUNTER — OFFICE VISIT (OUTPATIENT)
Dept: CARDIOLOGY | Facility: CLINIC | Age: 85
End: 2020-12-01

## 2020-12-01 VITALS
SYSTOLIC BLOOD PRESSURE: 142 MMHG | WEIGHT: 210 LBS | DIASTOLIC BLOOD PRESSURE: 70 MMHG | BODY MASS INDEX: 30.06 KG/M2 | HEART RATE: 77 BPM | TEMPERATURE: 97 F | OXYGEN SATURATION: 97 % | HEIGHT: 70 IN

## 2020-12-01 DIAGNOSIS — I50.22 CHRONIC SYSTOLIC CONGESTIVE HEART FAILURE (HCC): Primary | ICD-10-CM

## 2020-12-01 DIAGNOSIS — I48.21 PERMANENT ATRIAL FIBRILLATION (HCC): ICD-10-CM

## 2020-12-01 DIAGNOSIS — I49.5 SICK SINUS SYNDROME (HCC): ICD-10-CM

## 2020-12-01 PROCEDURE — 99213 OFFICE O/P EST LOW 20 MIN: CPT | Performed by: INTERNAL MEDICINE

## 2020-12-01 PROCEDURE — 93279 PRGRMG DEV EVAL PM/LDLS PM: CPT | Performed by: INTERNAL MEDICINE

## 2020-12-01 RX ORDER — CEPHALEXIN 500 MG/1
500 CAPSULE ORAL 2 TIMES DAILY
COMMUNITY
Start: 2020-11-30 | End: 2021-07-02

## 2020-12-01 NOTE — PROGRESS NOTES
Micha Crockett  1935  PCP: Anthony Fried MD    SUBJECTIVE:   Micha Crockett is a 85 y.o. male seen for a consultation visit regarding the following:     Chief Complaint:   Chief Complaint   Patient presents with   • PAF        HPI:  Patient has been cardiac stable. Feeling well overall after Dr. Slaughter adjusted his medications. EF has decreased by ECHO findings to around 36-40%    History:  This is an 85-year-old patient referred by Dr. Slaughter for further evaluation and management of atrial fibrillation complete heart block.  Patient had a single-chamber pacemaker placed in 2015.  He has been 100% dependent on his pacemaker.  With no underlying heart rhythms.  He was having increased symptoms of fatigue and weakness secondary to beta-blocker use.  Since stopping his beta-blocker his cardiac symptoms have greatly improved.  He still remains pacemaker dependent with complete heart block underneath.      Cardiac PMH: (Old records have been reviewed and summarized below)  1. Remote paroxysmal atrial fibrillation with now chronic sustained atrial fibrillation and progressive sick sinus syndrome:  a. Diagnosed in [facility], in April 2014.   b. CHADS-VASc score of 4 with anticoagulation on Xarelto.  c. Successful cardioversion with restoration of sinus rhythm with advanced trifascicular block with ID interval of 292 msec in the setting of CRBB/LAHB, on 04/29/2014, by Dr. Slaughter.   d. Return of atrial fibrillation with Holter monitor.  e. Holter monitor for 48 hours with a minimum heart rate of 42 beats per minute and max of 138 beats per minute, with ventricular ectopy less than 1% of the time and supraventricular ectopy 2% of the time, 03/27/2015.  f. Echocardiogram showing estimated EF of 61% with mild-to-moderate LVH and left atrium slightly dilated, and right ventricle slightly dilated, and moderate aortic cuff sclerosis, and mild aortic regurgitation with mild mitral  regurgitation, and mild-to-moderate tricuspid regurgitation.  g. Implantation of [employer] single-chamber permanent pacemaker on 07/13/2015 by Dr. Cano, with acceptable interrogation and no reprogramming, April 2017, with subsequent acceptable remote check, August 2017, November 2018, with acceptable interrogation, January 2019, July 2019.  2. Syncopal episodes secondary to bradycardia.   3. Hypertension.   4. Dyslipidemia.   5. Diabetes mellitus type 2, hemoglobin  A1c 6.7%, July 2015; 6.6%, December 2018  6. Chronic right bundle branch block.   7. Degenerative joint disease.   8. Ventral hernia.   9. Chronic kidney disease.   10. Obstructive sleep apnea with the use of CPAP.   11. Surgical history:  a. Appendectomy.   b. Pilonidal cyst.  c. 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  d. Prostatectomy.   12. Recent progressive dysphoria, weakness, exercise intolerance, and fatigue, off CPAP therapy, with recently completed polysomnogram and laboratory studies - data deficit, July 2019          Past Medical History, Past Surgical History, Family history, Social History, and Medications were all reviewed with the patient today and updated as necessary.       Current Outpatient Medications:   •  aspirin 81 MG tablet, Take 81 mg by mouth Daily., Disp: , Rfl:   •  benazepril (LOTENSIN) 20 MG tablet, Take 1 tablet by mouth 2 (two) times a day., Disp: 180 tablet, Rfl: 3  •  carvedilol (COREG) 6.25 MG tablet, TAKE 1 TABLET BY MOUTH TWICE A DAY, Disp: 180 tablet, Rfl: 3  •  cephalexin (KEFLEX) 500 MG capsule, 500 mg 2 (Two) Times a Day., Disp: , Rfl:   •  fenofibrate 160 MG tablet, Take 160 mg by mouth Daily., Disp: , Rfl: 0  •  metFORMIN XR (GLUCOPHAGE-XR) 500 MG 24 hr tablet, Take 500 mg by mouth Daily., Disp: , Rfl: 1  •  pravastatin (PRAVACHOL) 20 MG tablet, Take 20 mg by mouth Daily., Disp: , Rfl: 1  •  XARELTO 15 MG tablet, TAKE 1 TABLET BY MOUTH EVERY DAY WITH DINNER, Disp: 90 tablet, Rfl:  3    Allergies   Allergen Reactions   • Bystolic [Nebivolol Hcl] Other (See Comments)     Fatigue, Couldn't sleep          Past Medical History:   Diagnosis Date   • Atrial fibrillation (CMS/HCC) 10/11/2016    Recently diagnosed in [facility], in April 2014.  CHADS-VASc score of 4 with anticoagulation on Xarelto. Successful cardioversion with restoration of sinus rhythm with advanced trifascicular block with OR interval of 292 msec in the setting of CRBB/LAHB, on 04/29/2014, by Dr. Slaughter.  Return of atrial fibrillation with Holter monitor. Holter monitor for 48 hours with a minimum heart rate of 42 beats per minute and max of 138 beats per minute, with ventricular ectopy less than 1% of the time and supraventricular ectopy 2% of the time, 03/27/2015. Echocardiogram showing estimated EF of 61% with mild-to-moderate LVH and left atrium slightly dilated, and right ventricle slightly dilated, and moderate aortic cuff sclerosis, and mild aortic regurgitation with mild mitral regurgitation, and mild-to-moderate tricuspid regurgitation. Implantation of  a [employer] single-chamber permanent pacemaker on 07/13/2015 by Dr. Cano.   • CKD (chronic kidney disease) 10/11/2016   • Diabetes mellitus, type 2 (CMS/HCC) 10/11/2016   • DJD (degenerative joint disease) 10/11/2016   • Dyslipidemia 10/11/2016   • Fall 30 nov 2020   • Hypertension 10/11/2016   • CANDY on CPAP 10/11/2016   • Right bundle branch block 10/11/2016   • Syncopal episodes 10/11/2016    episodes secondary to bradycardia.    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   • APPENDECTOMY     • OTHER SURGICAL HISTORY      Pilonidal cyst   • PROSTATECTOMY       Family History   Problem Relation Age of Onset   • Stroke Mother    • No Known Problems Father      Social History     Tobacco Use   • Smoking status: Former Smoker  "    Packs/day: 1.00     Types: Cigarettes     Quit date: 5     Years since quittin.9   • Smokeless tobacco: Never Used   Substance Use Topics   • Alcohol use: No          PHYSICAL EXAM:   /70 (BP Location: Left arm, Patient Position: Sitting)   Pulse 77   Temp 97 °F (36.1 °C)   Ht 177.8 cm (70\")   Wt 95.3 kg (210 lb)   SpO2 97%   BMI 30.13 kg/m²      Wt Readings from Last 5 Encounters:   20 95.3 kg (210 lb)   20 93 kg (205 lb)   20 93 kg (205 lb)   20 93 kg (205 lb)   19 85.7 kg (189 lb)     BP Readings from Last 5 Encounters:   20 142/70   20 155/74   20 147/89   19 131/77   19 136/74       General-Well Nourished, Well developed  Eyes - PERRLA  Neck- supple, No mass  CV- regular rate and rhythm, no MRG  Lung- clear bilaterally  Abd- soft, +BS  Musc/skel - Norm strength and range of motion  Skin- warm and dry  Neuro - Alert & Oriented x 3, appropriate mood.    Medical problems and test results were reviewed with the patient today.     Results for orders placed or performed during the hospital encounter of 20   Adult Transthoracic Echo Complete W/ Cont if Necessary Per Protocol   Result Value Ref Range    BSA 2.1 m^2    IVSd 1.7 cm    LVIDd 3.3 cm    LVIDs 2.8 cm    LVPWd 1.4 cm    IVS/LVPW 1.2     FS 17.3 %    EDV(Teich) 45.0 ml    ESV(Teich) 28.3 ml    EF(Teich) 37.1 %    EDV(cubed) 36.8 ml    ESV(cubed) 20.8 ml    EF(cubed) 43.4 %    LV mass(C)d 181.4 grams    LV mass(C)dI 86.0 grams/m^2    SV(Teich) 16.7 ml    SI(Teich) 7.9 ml/m^2    SV(cubed) 16.0 ml    SI(cubed) 7.6 ml/m^2    Ao root diam 4.2 cm    Ao root area 13.6 cm^2    LA dimension 4.8 cm    asc Aorta Diam 4.1 cm    LA/Ao 1.1     LVOT diam 2.2 cm    LVOT area 3.8 cm^2    LVOT area(traced) 3.8 cm^2    LAd major 7.7 cm    LVLd ap4 8.8 cm    EDV(MOD-sp4) 146.0 ml    LVLs ap4 7.9 cm    ESV(MOD-sp4) 81.0 ml    EF(MOD-sp4) 44.5 %    LVLd ap2 8.7 cm    EDV(MOD-sp2) 144.0 ml    " LVLs ap2 8.0 cm    ESV(MOD-sp2) 78.0 ml    EF(MOD-sp2) 45.8 %    LA volume 94.0 ml    EF(MOD-bp) 41 %    SV(MOD-sp4) 65.0 ml    SI(MOD-sp4) 30.8 ml/m^2    SV(MOD-sp2) 66.0 ml    SI(MOD-sp2) 31.3 ml/m^2    Ao root area (BSA corrected) 2.0     LV She[place]on Vol (BSA corrected) 69.2 ml/m^2    LV Sys Vol (BSA corrected) 38.4 ml/m^2    LA Volume Index 44.6 ml/m^2    MV E max farooq 61.7 cm/sec    MV A max farooq 29.3 cm/sec    MV E/A 2.1     LV IVRT 0.06 sec    MV P1/2t max farooq 83.4 cm/sec    MV P1/2t 87.2 msec    MVA(P1/2t) 2.5 cm^2    MV dec slope 280.2 cm/sec^2    MV dec time 0.3 sec    Ao pk farooq 88.2 cm/sec    Ao max PG 3.1 mmHg    Ao max PG (full) 0.94 mmHg    Ao V2 mean 64.0 cm/sec    Ao mean PG 1.7 mmHg    Ao mean PG (full) 0.46 mmHg    Ao V2 VTI 20.2 cm    ABEBA(I,A) 2.7 cm^2    ABEBA(I,D) 2.7 cm^2    ABEBA(V,A) 3.2 cm^2    ABEBA(V,D) 3.2 cm^2    AI max farooq 262.3 cm/sec    AI max PG 27.8 mmHg    AI dec slope 91.9 cm/sec^2    AI P1/2t 836.1 msec    LV V1 max PG 2.2 mmHg    LV V1 mean PG 1.2 mmHg    LV V1 max 73.7 cm/sec    LV V1 mean 50.7 cm/sec    LV V1 VTI 14.4 cm    MR max farooq 504.9 cm/sec    MR max .0 mmHg    MR mean farooq 381.2 cm/sec    MR mean PG 66.0 mmHg    MR .9 cm    SV(Ao) 274.5 ml    SI(Ao) 130.1 ml/m^2    SV(LVOT) 55.2 ml    SI(LVOT) 26.1 ml/m^2    PA V2 max 64.0 cm/sec    PA max PG 1.6 mmHg    PA acc slope 354.6 cm/sec^2    PA acc time 0.15 sec    TR max farooq 290 cm/sec    TR max PG 34 mmHg    PA pr(Accel) 12.5 mmHg    MVA P1/2T LCG 2.6 cm^2    Lat E/e'  5.6     Med E/e' 10.8     Lat Peak E' Farooq 10.7 cm/sec    Med Peak E' Farooq 5.6 cm/sec     CV ECHO ALEJANDRA - BZI_BMI 29.4 kilograms/m^2     CV ECHO ALEJANDRA - BSA(HAYCOCK) 2.2 m^2     CV ECHO ALEJANDRA - BZI_METRIC_WEIGHT 93.0 kg     CV ECHO ALEJANDRA - BZI_METRIC_HEIGHT 177.8 cm    Avg E/e' ratio 7.57     Target HR (85%) 116 bpm    Max. Pred. HR (100%) 137 bpm     CV VAS BP LEFT /96 mmHg    RV S' 7.11 cm/sec    RV Base 3.80 cm    RV Length 6.70 cm    RV Mid  2.20 cm    RAP systole 8 mmHg    RVSP(TR) 42 mmHg    TAPSE (>1.6) 1.70 cm    Ascending aorta 4.1 cm    Echo EF Estimated 39 %         No results found for: CHOL, HDL, HDLC, LDL, LDLC, VLDL    EKG:  (EKG/Tracing has been independently visualized by me and summarized below)    Procedures    ASSESSMENT and PLAN  1.  Weakness and fatigue-much improved off his Bystolic therapy.  Made minor adjustments to his pacemaker to help with rate response.  2.  Permanent atrial fibrillation-rates are controlled and the patient is 100% paced.  No evidence of tachycardia. Only rare break through events.   3.  Pacemaker-stable VVIR function-changed lower rate limit to 70 bpm and adjusted rate response.  4.  Chronic systolic heart failure - EF has decreased to 36-40% - If symptoms get worse will plan for upgrade to a Biv pacemaker.    Return in about 6 months (around 6/1/2021) for office visit and device check with [employer].            Luc aLnge M.D., F.A.C.C, F.H.R.S.  Cardiology/Electrophysiology  12/01/20  14:57 EST

## 2021-05-26 RX ORDER — RIVAROXABAN 15 MG/1
TABLET, FILM COATED ORAL
Qty: 90 TABLET | Refills: 3 | Status: SHIPPED | OUTPATIENT
Start: 2021-05-26 | End: 2022-05-24

## 2021-06-25 RX ORDER — HYDROCHLOROTHIAZIDE 25 MG/1
TABLET ORAL
Qty: 90 TABLET | Refills: 1 | Status: SHIPPED | OUTPATIENT
Start: 2021-06-25 | End: 2022-01-03

## 2021-07-02 ENCOUNTER — OFFICE VISIT (OUTPATIENT)
Dept: CARDIOLOGY | Facility: CLINIC | Age: 86
End: 2021-07-02

## 2021-07-02 VITALS
OXYGEN SATURATION: 98 % | SYSTOLIC BLOOD PRESSURE: 138 MMHG | HEIGHT: 70 IN | DIASTOLIC BLOOD PRESSURE: 72 MMHG | HEART RATE: 75 BPM | BODY MASS INDEX: 30.64 KG/M2 | WEIGHT: 214 LBS

## 2021-07-02 DIAGNOSIS — E11.9 TYPE 2 DIABETES MELLITUS WITHOUT COMPLICATION, WITHOUT LONG-TERM CURRENT USE OF INSULIN (HCC): ICD-10-CM

## 2021-07-02 DIAGNOSIS — I50.32 CHRONIC DIASTOLIC HEART FAILURE (HCC): ICD-10-CM

## 2021-07-02 DIAGNOSIS — I10 ESSENTIAL HYPERTENSION: ICD-10-CM

## 2021-07-02 DIAGNOSIS — I48.91 ATRIAL FIBRILLATION, UNSPECIFIED TYPE (HCC): Primary | ICD-10-CM

## 2021-07-02 DIAGNOSIS — G47.33 OSA ON CPAP: ICD-10-CM

## 2021-07-02 DIAGNOSIS — Z99.89 OSA ON CPAP: ICD-10-CM

## 2021-07-02 DIAGNOSIS — E78.5 DYSLIPIDEMIA: ICD-10-CM

## 2021-07-02 PROCEDURE — 99214 OFFICE O/P EST MOD 30 MIN: CPT | Performed by: INTERNAL MEDICINE

## 2021-07-02 RX ORDER — PRAVASTATIN SODIUM 40 MG
40 TABLET ORAL
COMMUNITY
Start: 2021-05-03

## 2021-07-02 NOTE — PROGRESS NOTES
Subjective:     Encounter Date:07/02/2021    Patient ID: Micha Crockett is a 86 y.o.  white male, a / retired farmer, from [place], [place].       PHYSICIAN: Anthony Fried MD  PREVIOUS CARDIOLOGIST: Gypsy Gibbs MD  ELECTROPHYSIOLOGIST: Luc Lange MD, [facility], [facility]  ORTHOPEDIST: Vitaly Omer MD  SLEEP MD:  Gypsy Gibbs MD     Chief Complaint:   Chief Complaint   Patient presents with   • Atrial Fibrillation   • Congestive Heart Failure   • Hypertension   • RBBB       Problem List:  1. Remote paroxysmal atrial fibrillation with now chronic sustained atrial fibrillation and progressive sick sinus syndrome:  a. Diagnosed in [facility], in April 2014.   b. CHADS-VASc score of 4 with anticoagulation on Xarelto.  c. Successful cardioversion with restoration of sinus rhythm with advanced trifascicular block with MA interval of 292 msec in the setting of CRBB/LAHB, on 04/29/2014, by Dr. Slaughter.   d. Return of atrial fibrillation with Holter monitor.  e. Holter monitor for 48 hours with a minimum heart rate of 42 beats per minute and max of 138 beats per minute, with ventricular ectopy less than 1% of the time and supraventricular ectopy 2% of the time, 03/27/2015.  f. Echocardiogram showing estimated EF of 61% with mild-to-moderate LVH and left atrium slightly dilated, and right ventricle slightly dilated, and moderate aortic cuff sclerosis, and mild aortic regurgitation with mild mitral regurgitation, and mild-to-moderate tricuspid regurgitation.  g. Implantation of [employer] single-chamber permanent pacemaker on 07/13/2015 by Dr. Cano (can and leads are not MRI compatible), with acceptable interrogation and no reprogramming, April 2017, with subsequent acceptable remote check, August 2017, November 2018, with acceptable interrogation, January 2019, July 2019, July 2021.  h. Recent NYHA class II-III exertional dyspnea and fatigue without angina  pectoris with abnormal echocardiogram (May 2019) and subsequent EP assessment and discontinuation of Bystolic and stable abnormal echocardiogram (LVEF 0.39), September 2020  i. Residual class I symptoms, August 2019, April 2020, September 2020, July 2021  2. Syncopal episodes secondary to bradycardia.   3. Hypertension.   4. Dyslipidemia.   5. Diabetes mellitus type 2, hemoglobin  A1c 6.7%, July 2015; 6.6%, December 2018  6. Chronic right bundle branch block.   7. Chronic diastolic heart failure.  8. Degenerative joint disease.   9. Ventral hernia.   10. Chronic kidney disease.   11. Obstructive sleep apnea with the use of CPAP.   12. Surgical history:  a. Appendectomy.   b. Pilonidal cyst.  c. 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  d. Prostatectomy.   13. Recent progressive dysphoria, weakness, exercise intolerance, and fatigue, off CPAP therapy, with recently completed polysomnogram and laboratory studies - data deficit, July 2019    Allergies   Allergen Reactions   • Bystolic [Nebivolol Hcl] Other (See Comments)     Fatigue, Couldn't sleep        Current Outpatient Medications:   •  aspirin 81 MG tablet, Take 81 mg by mouth Daily., Disp: , Rfl:   •  benazepril (LOTENSIN) 20 MG tablet, Take 1 tablet by mouth 2 (two) times a day., Disp: 180 tablet, Rfl: 3  •  carvedilol (COREG) 6.25 MG tablet, TAKE 1 TABLET BY MOUTH TWICE A DAY, Disp: 180 tablet, Rfl: 3  •  fenofibrate 160 MG tablet, Take 160 mg by mouth Daily., Disp: , Rfl: 0  •  hydroCHLOROthiazide (HYDRODIURIL) 25 MG tablet, TAKE 1 TABLET BY MOUTH EVERY DAY, Disp: 90 tablet, Rfl: 1  •  metFORMIN XR (GLUCOPHAGE-XR) 500 MG 24 hr tablet, Take 500 mg by mouth 2 (two) times a day., Disp: , Rfl: 1  •  Xarelto 15 MG tablet, TAKE 1 TABLET BY MOUTH EVERY DAY WITH DINNER, Disp: 90 tablet, Rfl: 3  •  pravastatin (PRAVACHOL) 40 MG tablet, Take 40 mg by mouth every night at bedtime., Disp: , Rfl:     History of Present Illness: Patient returns after 10-month  "hiatus for follow up.  He had laboratory studies in 2021 and was told that they were acceptable.  He denies any chest pain, shortness of breath, palpitations, dizziness, presyncope, syncope, or edema.  He has arthritis in his lower back and recently had x-rays.  It was recommended that he start physical therapy but he is going to defer that for now.  He is trying to walk on flat surfaces instead of on an incline to help with his back pain.  He walks on , , and .  He is planning to go to [place] late 2021 and used to live in the [place] area.  He has had both of his Pfizer Covid vaccinations. He is accompanied to the office today by his wife who confirms his history. He has been under a great amount of stress related to the loss of an older and younger brother over the past six months. The younger brother apparently  of sudden cardiac death while in hospital; data deficit.        Review of Systems   Musculoskeletal: Positive for arthritis and back pain.      Obtained and negative except as outlined in problem list and HPI.    Procedures       Objective:       Vitals:    21 1325 21 1335   BP: 127/74 138/72   BP Location: Left arm Left arm   Patient Position: Sitting Standing   Pulse: 72 75   SpO2: 98%    Weight: 97.1 kg (214 lb)    Height: 177.8 cm (70\")      Body mass index is 30.71 kg/m².  Last weight: 205 lbs    Vitals reviewed.   Constitutional:       Appearance: Well-developed.   Neck:      Thyroid: No thyromegaly.      Vascular: No carotid bruit or JVD.      Lymphadenopathy: No cervical adenopathy.   Pulmonary:      Effort: Pulmonary effort is normal.      Breath sounds: Normal breath sounds. No wheezing. No rhonchi. No rales.   Cardiovascular:      Regular rhythm.      Murmurs: There is a grade 2/6 mid frequency harsh early systolic murmur at the LLSB.      No gallop. No S3 gallop.   Pulses:     Dorsalis pedis: 2+ bilaterally.     Posterior tibial: 2+ " bilaterally.  Edema:     Peripheral edema absent.   Abdominal:      Palpations: Abdomen is soft. There is no abdominal mass.      Tenderness: There is no abdominal tenderness. There is no guarding.   Musculoskeletal: Normal range of motion. Skin:     General: Skin is warm and dry.      Findings: No rash.      Comments: Left precordial pacemaker site nominal   Neurological:      Mental Status: Alert and oriented to person, place, and time.           Lab Review:     No recent laboratory studies available for review today.    Echocardiogram, 9/30/2020:  · Left ventricular wall thickness is consistent with moderate concentric hypertrophy.  · The left atrial cavity is moderately dilated.  · Mild aortic valve regurgitation is present.  · Moderate mitral annular calcification is present.  · Mild to moderate mitral valve regurgitation is present.  · Moderate tricuspid valve regurgitation is present.  · Estimated right ventricular systolic pressure from tricuspid regurgitation is mildly elevated (35-45 mmHg). Calculated right ventricular systolic pressure from tricuspid regurgitation is 42 mmHg.  · The following left ventricular wall segments are hypokinetic: mid anterior, basal anterolateral, mid anterolateral, basal inferolateral, mid inferolateral, apical inferior, mid inferior, basal inferoseptal, mid inferoseptal, mid anteroseptal, basal anterior, basal inferior and basal inferoseptal. The following left ventricular wall segments are akinetic: apical anterior, apical lateral, apical septal and apex.  · Estimated left ventricular EF = 39% Estimated left ventricular EF was in agreement with the calculated left ventricular EF. Left ventricular ejection fraction appears to be 36 - 40%. Left ventricular systolic function is moderately decreased.  · Left ventricular diastolic function is consistent with (grade Ia w/high LAP) impaired relaxation.  · Normal right ventricular cavity size, wall thickness, systolic function and  septal motion noted.  · No evidence of left ventricular thrombus or mass present.  · The aortic valve exhibits mild sclerosis.  · Mild to moderate pulmonary hypertension is present.  · Mild dilation of the aortic root and of the sinuses of Valsalva present.  · An apical septal left ventricular aneurysm is present.      [employer] single-chamber pacemaker interrogation 7/02/2021: 100% ventricular pacing, 4 years battery longevity, 7 BHR with the longest 5 beats, 100% dependent, can and the leads are not MRI compatible    Assessment:       Overall continued acceptable course with no new interim cardiopulmonary complaints with acceptable functional status. We will defer additional diagnostic or therapeutic intervention from a cardiac perspective at this time.  Acceptable device interrogation in office today with no reprogramming.  The patient's last EF was 39% in September 2020.  We will have the patient consume and discontinue benazepril and initiate Entresto 24/26 mg twice daily, 36 hours after discontinuing benazepril.     Diagnosis Plan   1. Atrial fibrillation, unspecified type (CMS/HCC)  Acceptable device interrogation in office today with no reprogramming.     2. Chronic diastolic heart failure (CMS/HCC)  No recurrent angina pectoris or CHF on current activity schedule;  The patient's last EF was 39% in September 2020.  We will have the patient consume and discontinue benazepril and initiate Entresto 24/26 mg twice daily, 36 hours after discontinuing benazepril.   3. Essential hypertension  Controlled, continue current cardiac medications   4. Dyslipidemia  No new labs to review   5. Type 2 diabetes mellitus without complication, without long-term current use of insulin (CMS/HCC)  No new labs to review, increase physical activity as tolerated, heart healthy diet   6. CANDY on CPAP  Encouraged compliance          Plan:         1. Patient to continue current medications and close follow up with the above  providers with the following medication changes:  A. Consume and discontinue benzepril.  B. Initiate Entresto 24/26 mg bid 36 hours after stopping benazepril.  2. Tentative cardiology follow up in January 2022 or patient may return sooner PRN.  3. Device interrogation in office-acceptable with no reprogramming  4. Patient to call in 2 weeks to let us know if he is tolerating Entresto    Scribed for Daniel Slaughter MD by Kathleen Oneill, APRN. 7/2/2021  13:57 EDT    I, Daniel Slaughter MD, [facility], personally performed the services described in this documentation as scribed by the above named individual in my presence, and it is both accurate and complete. At 14:53 EDT on 07/02/2021

## 2021-07-11 PROCEDURE — 93294 REM INTERROG EVL PM/LDLS PM: CPT | Performed by: INTERNAL MEDICINE

## 2021-07-11 PROCEDURE — 93296 REM INTERROG EVL PM/IDS: CPT | Performed by: INTERNAL MEDICINE

## 2021-07-24 ENCOUNTER — TELEPHONE (OUTPATIENT)
Dept: CARDIOLOGY | Facility: CLINIC | Age: 86
End: 2021-07-24

## 2021-07-24 DIAGNOSIS — E78.5 DYSLIPIDEMIA: Primary | ICD-10-CM

## 2021-07-26 RX ORDER — CARVEDILOL 6.25 MG/1
TABLET ORAL
Qty: 180 TABLET | Refills: 3 | Status: SHIPPED | OUTPATIENT
Start: 2021-07-26 | End: 2022-07-18

## 2021-08-02 NOTE — TELEPHONE ENCOUNTER
Labs requested from PCP    Spoke with pt - wife dropped off BP readings on Friday, 7/23/21 - will try to locate for KTS review

## 2021-10-04 RX ORDER — AMLODIPINE BESYLATE 5 MG/1
TABLET ORAL
Qty: 90 TABLET | Refills: 3 | OUTPATIENT
Start: 2021-10-04

## 2021-10-10 PROCEDURE — 93294 REM INTERROG EVL PM/LDLS PM: CPT | Performed by: STUDENT IN AN ORGANIZED HEALTH CARE EDUCATION/TRAINING PROGRAM

## 2021-10-10 PROCEDURE — 93296 REM INTERROG EVL PM/IDS: CPT | Performed by: STUDENT IN AN ORGANIZED HEALTH CARE EDUCATION/TRAINING PROGRAM

## 2021-11-02 RX ORDER — AMLODIPINE BESYLATE 5 MG/1
TABLET ORAL
Qty: 90 TABLET | Refills: 3 | OUTPATIENT
Start: 2021-11-02

## 2022-01-03 RX ORDER — HYDROCHLOROTHIAZIDE 25 MG/1
TABLET ORAL
Qty: 90 TABLET | Refills: 1 | Status: SHIPPED | OUTPATIENT
Start: 2022-01-03 | End: 2022-08-08

## 2022-01-09 PROCEDURE — 93294 REM INTERROG EVL PM/LDLS PM: CPT | Performed by: STUDENT IN AN ORGANIZED HEALTH CARE EDUCATION/TRAINING PROGRAM

## 2022-01-09 PROCEDURE — 93296 REM INTERROG EVL PM/IDS: CPT | Performed by: STUDENT IN AN ORGANIZED HEALTH CARE EDUCATION/TRAINING PROGRAM

## 2022-01-10 NOTE — PROGRESS NOTES
Subjective:     Encounter Date:01/13/2022      Patient ID: Micha Crockett is a 86 y.o.  white male, a / retired farmer, from [place], [place].       PHYSICIAN: Anthony Fried MD  PREVIOUS CARDIOLOGIST: Gypsy Gibbs MD  ELECTROPHYSIOLOGIST: Luc Lange MD, [facility], [facility]  ORTHOPEDIST: Vitaly Omer MD  SLEEP MD:  Gypsy Gibbs MD .    Chief Complaint:   Chief Complaint   Patient presents with   • Atrial fibrillation, unspecified type   • Chronic systolic congestive heart failure     Problem List:  1. Remote paroxysmal atrial fibrillation with now chronic sustained atrial fibrillation and progressive sick sinus syndrome:  a. Diagnosed in [facility], in April 2014.   b. CHADS-VASc score of 4 with anticoagulation on Xarelto.  c. Successful cardioversion with restoration of sinus rhythm with advanced trifascicular block with IN interval of 292 msec in the setting of CRBB/LAHB, on 04/29/2014, by Dr. Slaughter.   d. Return of atrial fibrillation with Holter monitor.  e. Holter monitor for 48 hours with a minimum heart rate of 42 beats per minute and max of 138 beats per minute, with ventricular ectopy less than 1% of the time and supraventricular ectopy 2% of the time, 03/27/2015.  f. Echocardiogram showing estimated EF of 61% with mild-to-moderate LVH and left atrium slightly dilated, and right ventricle slightly dilated, and moderate aortic cuff sclerosis, and mild aortic regurgitation with mild mitral regurgitation, and mild-to-moderate tricuspid regurgitation.  g. Implantation of [employer] single-chamber permanent pacemaker on 07/13/2015 by Dr. Cano (can and leads are not MRI compatible), with acceptable interrogation and no reprogramming, April 2017, with subsequent acceptable remote check, August 2017, November 2018, with acceptable interrogation, January 2019, July 2019, July 2021.  h. Recent NYHA class II-III exertional dyspnea and fatigue  without angina pectoris with abnormal echocardiogram (May 2019) and subsequent EP assessment and discontinuation of Bystolic and stable abnormal echocardiogram (LVEF 0.39), September 2020  i. Residual class I symptoms, August 2019, April 2020, September 2020, July 2021, January 2022  2. Syncopal episodes secondary to bradycardia.   3. Hypertension.   4. Dyslipidemia.   5. Diabetes mellitus type 2, hemoglobin  A1c 6.7%, July 2015; 6.6%, December 2018  6. Chronic right bundle branch block.   7. Chronic diastolic heart failure.  8. Degenerative joint disease.   9. Ventral hernia.   10. Chronic kidney disease.   11. Obstructive sleep apnea with the use of CPAP.   12. Surgical history:  a. Appendectomy.   b. Pilonidal cyst.  c. 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  d. Prostatectomy.   13. Recent progressive dysphoria, weakness, exercise intolerance, and fatigue, off CPAP therapy, with recently completed polysomnogram and laboratory studies - data deficit, July 2019    Allergies   Allergen Reactions   • Bystolic [Nebivolol Hcl] Other (See Comments)     Fatigue, Couldn't sleep        Current Outpatient Medications   Medication Instructions   • aspirin 81 mg, Oral, Daily   • benazepril (LOTENSIN) 20 mg, Oral, Nightly   • carvedilol (COREG) 6.25 MG tablet TAKE 1 TABLET BY MOUTH TWICE A DAY   • cetirizine (ZYRTEC) 10 mg, Oral, As Needed   • docusate sodium (COLACE) 100 mg, Oral, 2 Times Daily   • fenofibrate 160 mg, Oral, Daily   • hydroCHLOROthiazide (HYDRODIURIL) 25 MG tablet TAKE 1 TABLET BY MOUTH EVERY DAY   • metFORMIN ER (GLUCOPHAGE-XR) 500 mg, Oral, 2 times daily   • pravastatin (PRAVACHOL) 40 mg, Oral, Every Night at Bedtime   • sacubitril-valsartan (ENTRESTO) 24-26 MG tablet 1 tablet, Oral, 2 Times Daily   • Xarelto 15 MG tablet TAKE 1 TABLET BY MOUTH EVERY DAY WITH DINNER         HISTORY OF PRESENT ILLNESS:  The patient is here for 6-month follow-up.  At his last appointment we discontinued  "benazepril and started Entresto 24/26 mg twice daily.  The patient tries to stay very busy and just got done remodeling his kitchen and is looking for a new project.  He denies any chest pain, increased shortness of breath, edema, palpitations, presyncope, or syncope.  He has had both of his COVID vaccinations and his booster.  He had laboratory testing about 2 months ago and was told that his results were acceptable.  Whenever he checks his blood pressure at home it has been WNL.  His wife had a stroke in October 2021 but has no residual deficits.  She was able to get tPA.      Review of Systems   All other systems reviewed and are negative.     All other systems reviewed and otherwise negative.    Procedures       Objective:       Vitals:    01/13/22 1445   BP: 136/78   BP Location: Right arm   Patient Position: Sitting   Cuff Size: Adult   Pulse: 73   SpO2: 96%   Weight: 100 kg (221 lb)   Height: 177.8 cm (70\")     Body mass index is 31.71 kg/m².  Last weight July 2021 was 214 pounds  Constitutional:       Appearance: Healthy appearance. Not in distress.   Neck:      Vascular: No JVR. JVD normal.   Pulmonary:      Effort: Pulmonary effort is normal.      Breath sounds: Normal breath sounds. No wheezing. No rhonchi. No rales.   Chest:      Chest wall: Not tender to palpatation.   Cardiovascular:      PMI at left midclavicular line. Normal rate. Regular rhythm. Normal S1. Normal S2.      Murmurs: There is a grade 2/6 harsh systolic murmur at the LLSB.      No gallop. No click. No rub.   Pulses:     Dorsalis pedis: 2+ bilaterally.     Posterior tibial: 2+ bilaterally.  Edema:     Peripheral edema absent.   Abdominal:      General: Bowel sounds are normal.      Palpations: Abdomen is soft.      Tenderness: There is no abdominal tenderness.   Musculoskeletal: Normal range of motion.         General: No tenderness. Skin:     General: Skin is warm and dry.      Comments: Left precordial pacemaker site nominal "   Neurological:      General: No focal deficit present.      Mental Status: Alert and oriented to person, place and time.           Lab Review:   Lab Results   Component Value Date    GLUCOSE 107 (H) 07/15/2015    BUN 28 (H) 07/15/2015    CREATININE 1.4 (H) 07/15/2015    CO2 29 07/15/2015    CALCIUM 8.8 07/15/2015    ALBUMIN 4.0 07/12/2015    AST 17 07/12/2015    ALT 16 07/12/2015       Lab Results   Component Value Date    WBC 10.47 07/14/2015    HGB 12.0 (L) 07/14/2015    HCT 37.3 (L) 07/14/2015    MCV 86.9 07/14/2015     07/14/2015       Lab Results   Component Value Date    HGBA1C 6.7 (H) 07/12/2015       Lab Results   Component Value Date    TSH 3.548 07/12/2015         Lab Results   Component Value Date     (H) 07/12/2015     [employer] remote device interrogation 12/30/2021: 100% RV pacing, battery with 4 years longevity, 9 episodes of nonsustained V. tach with the longest lasting 10 seconds with ventricular rate 171 bpm.    Advance Care Planning   ACP discussion was held with the patient during this visit. Patient has an advance directive in EMR which is still valid.     [employer] device interrogation 1/13/2022: 4 years battery longevity, 13 VHR, no reprogramming, pacemaker dependent with 100% RV pacing      Assessment:       Overall continued acceptable course with no new interim cardiopulmonary complaints with acceptable functional status. The patient's EF was 39% on his last echocardiogram in September 2020.  He was discontinued off of his benazepril and started on Entresto 24/26 mg twice daily in July 2021.  I will order a repeat echocardiogram to reassess EF.  He is in permanent atrial fibrillation. He had an acceptable device interrogation in office today with no reprogramming.     Diagnosis Plan   1. Chronic systolic congestive heart failure (HCC)  No recurrent angina pectoris or CHF on current activity schedule; continue current treatment, order echocardiogram to reassess  EF since starting Entresto   2. Permanent atrial fibrillation (HCC)   Stable, acceptable device interrogation in office today with no reprogramming, continue Coreg, Xarelto   3. Primary hypertension   Controlled, continue current cardiac medications   4. Sick sinus syndrome (HCC)  Stable, acceptable device interrogation in office today with no reprogramming   5. Type 2 diabetes mellitus without complication, without long-term current use of insulin (HCC)   No new labs to review, continue heart healthy diet and physical activity as tolerated   6. CANDY on CPAP   Recommend continued compliance          Plan:         1. Patient to continue current medications and close follow up with the above providers.  2. Tentative cardiology follow up in July 2022 or patient may return sooner PRN.  3. [employer] pacemaker interrogation-acceptable with no reprogramming  4. Echocardiogram same day as next appointment      Electronically signed by UGO Stroud, 01/13/22, 3:21 PM EST.

## 2022-01-13 ENCOUNTER — OFFICE VISIT (OUTPATIENT)
Dept: CARDIOLOGY | Facility: CLINIC | Age: 87
End: 2022-01-13

## 2022-01-13 VITALS
HEIGHT: 70 IN | OXYGEN SATURATION: 96 % | SYSTOLIC BLOOD PRESSURE: 136 MMHG | WEIGHT: 221 LBS | HEART RATE: 73 BPM | DIASTOLIC BLOOD PRESSURE: 78 MMHG | BODY MASS INDEX: 31.64 KG/M2

## 2022-01-13 DIAGNOSIS — I49.5 SICK SINUS SYNDROME: ICD-10-CM

## 2022-01-13 DIAGNOSIS — I10 PRIMARY HYPERTENSION: ICD-10-CM

## 2022-01-13 DIAGNOSIS — I48.21 PERMANENT ATRIAL FIBRILLATION: ICD-10-CM

## 2022-01-13 DIAGNOSIS — G47.33 OSA ON CPAP: ICD-10-CM

## 2022-01-13 DIAGNOSIS — E11.9 TYPE 2 DIABETES MELLITUS WITHOUT COMPLICATION, WITHOUT LONG-TERM CURRENT USE OF INSULIN: ICD-10-CM

## 2022-01-13 DIAGNOSIS — Z99.89 OSA ON CPAP: ICD-10-CM

## 2022-01-13 DIAGNOSIS — I50.22 CHRONIC SYSTOLIC CONGESTIVE HEART FAILURE: Primary | ICD-10-CM

## 2022-01-13 PROCEDURE — 93279 PRGRMG DEV EVAL PM/LDLS PM: CPT | Performed by: NURSE PRACTITIONER

## 2022-01-13 PROCEDURE — 99214 OFFICE O/P EST MOD 30 MIN: CPT | Performed by: NURSE PRACTITIONER

## 2022-01-13 RX ORDER — BENAZEPRIL HYDROCHLORIDE 20 MG/1
20 TABLET ORAL NIGHTLY
COMMUNITY
End: 2022-07-13 | Stop reason: ALTCHOICE

## 2022-01-13 RX ORDER — DOCUSATE SODIUM 100 MG/1
100 CAPSULE, LIQUID FILLED ORAL 2 TIMES DAILY
COMMUNITY

## 2022-01-13 RX ORDER — CETIRIZINE HYDROCHLORIDE 10 MG/1
10 TABLET ORAL AS NEEDED
COMMUNITY

## 2022-04-10 PROCEDURE — 93294 REM INTERROG EVL PM/LDLS PM: CPT | Performed by: STUDENT IN AN ORGANIZED HEALTH CARE EDUCATION/TRAINING PROGRAM

## 2022-04-10 PROCEDURE — 93296 REM INTERROG EVL PM/IDS: CPT | Performed by: STUDENT IN AN ORGANIZED HEALTH CARE EDUCATION/TRAINING PROGRAM

## 2022-05-24 RX ORDER — RIVAROXABAN 15 MG/1
TABLET, FILM COATED ORAL
Qty: 90 TABLET | Refills: 3 | Status: SHIPPED | OUTPATIENT
Start: 2022-05-24

## 2022-07-13 ENCOUNTER — HOSPITAL ENCOUNTER (OUTPATIENT)
Dept: CARDIOLOGY | Facility: HOSPITAL | Age: 87
Discharge: HOME | End: 2022-07-13
Admitting: NURSE PRACTITIONER

## 2022-07-13 ENCOUNTER — OFFICE VISIT (OUTPATIENT)
Dept: CARDIOLOGY | Facility: CLINIC | Age: 87
End: 2022-07-13

## 2022-07-13 VITALS
SYSTOLIC BLOOD PRESSURE: 140 MMHG | HEIGHT: 70 IN | OXYGEN SATURATION: 99 % | WEIGHT: 210 LBS | HEART RATE: 72 BPM | DIASTOLIC BLOOD PRESSURE: 76 MMHG | BODY MASS INDEX: 30.06 KG/M2

## 2022-07-13 VITALS — HEIGHT: 71 IN | WEIGHT: 221 LBS | BODY MASS INDEX: 30.94 KG/M2

## 2022-07-13 DIAGNOSIS — I10 PRIMARY HYPERTENSION: ICD-10-CM

## 2022-07-13 DIAGNOSIS — E11.9 TYPE 2 DIABETES MELLITUS WITHOUT COMPLICATION, WITHOUT LONG-TERM CURRENT USE OF INSULIN: ICD-10-CM

## 2022-07-13 DIAGNOSIS — I49.5 SICK SINUS SYNDROME: ICD-10-CM

## 2022-07-13 DIAGNOSIS — I50.22 CHRONIC SYSTOLIC CONGESTIVE HEART FAILURE: Primary | ICD-10-CM

## 2022-07-13 DIAGNOSIS — I48.21 PERMANENT ATRIAL FIBRILLATION: ICD-10-CM

## 2022-07-13 DIAGNOSIS — G47.33 OSA ON CPAP: ICD-10-CM

## 2022-07-13 DIAGNOSIS — Z99.89 OSA ON CPAP: ICD-10-CM

## 2022-07-13 DIAGNOSIS — I50.22 CHRONIC SYSTOLIC CONGESTIVE HEART FAILURE: ICD-10-CM

## 2022-07-13 LAB
ASCENDING AORTA: 3.7 CM
BH CV ECHO MEAS - AI P1/2T: 489.7 MSEC
BH CV ECHO MEAS - AO MAX PG: 3.5 MMHG
BH CV ECHO MEAS - AO MEAN PG: 1.96 MMHG
BH CV ECHO MEAS - AO ROOT AREA (BSA CORRECTED): 2 CM2
BH CV ECHO MEAS - AO ROOT DIAM: 4.2 CM
BH CV ECHO MEAS - AO V2 MAX: 94.1 CM/SEC
BH CV ECHO MEAS - AO V2 VTI: 18.3 CM
BH CV ECHO MEAS - AVA(I,D): 2.5 CM2
BH CV ECHO MEAS - EDV(CUBED): 144.3 ML
BH CV ECHO MEAS - EDV(MOD-SP2): 117 ML
BH CV ECHO MEAS - EDV(MOD-SP4): 109 ML
BH CV ECHO MEAS - EF(MOD-BP): 50.4 %
BH CV ECHO MEAS - EF(MOD-SP2): 55.9 %
BH CV ECHO MEAS - EF(MOD-SP4): 43.9 %
BH CV ECHO MEAS - ESV(CUBED): 40.3 ML
BH CV ECHO MEAS - ESV(MOD-SP2): 51.6 ML
BH CV ECHO MEAS - ESV(MOD-SP4): 61.1 ML
BH CV ECHO MEAS - FS: 34.6 %
BH CV ECHO MEAS - IVS/LVPW: 1.1 CM
BH CV ECHO MEAS - IVSD: 1.1 CM
BH CV ECHO MEAS - LA DIMENSION: 4.9 CM
BH CV ECHO MEAS - LAT PEAK E' VEL: 15.4 CM/SEC
BH CV ECHO MEAS - LV DIASTOLIC VOL/BSA (35-75): 50.1 CM2
BH CV ECHO MEAS - LV MASS(C)D: 215.6 GRAMS
BH CV ECHO MEAS - LV MAX PG: 1.72 MMHG
BH CV ECHO MEAS - LV MEAN PG: 0.91 MMHG
BH CV ECHO MEAS - LV SYSTOLIC VOL/BSA (12-30): 28.1 CM2
BH CV ECHO MEAS - LV V1 MAX: 65.5 CM/SEC
BH CV ECHO MEAS - LV V1 VTI: 12.9 CM
BH CV ECHO MEAS - LVIDD: 5.2 CM
BH CV ECHO MEAS - LVIDS: 3.4 CM
BH CV ECHO MEAS - LVOT AREA: 3.6 CM2
BH CV ECHO MEAS - LVOT DIAM: 2.13 CM
BH CV ECHO MEAS - LVPWD: 1 CM
BH CV ECHO MEAS - MED PEAK E' VEL: 9.1 CM/SEC
BH CV ECHO MEAS - MR MAX PG: 78.5 MMHG
BH CV ECHO MEAS - MR MAX VEL: 443 CM/SEC
BH CV ECHO MEAS - MR MEAN PG: 46.6 MMHG
BH CV ECHO MEAS - MR MEAN VEL: 317.5 CM/SEC
BH CV ECHO MEAS - MR VTI: 118.5 CM
BH CV ECHO MEAS - MV A MAX VEL: 39.8 CM/SEC
BH CV ECHO MEAS - MV DEC SLOPE: 446.8 CM/SEC2
BH CV ECHO MEAS - MV DEC TIME: 0.2 MSEC
BH CV ECHO MEAS - MV E MAX VEL: 81.5 CM/SEC
BH CV ECHO MEAS - MV E/A: 2.05
BH CV ECHO MEAS - MV MAX PG: 2.06 MMHG
BH CV ECHO MEAS - MV MEAN PG: 0.81 MMHG
BH CV ECHO MEAS - MV P1/2T: 55.3 MSEC
BH CV ECHO MEAS - MV V2 VTI: 17.2 CM
BH CV ECHO MEAS - MVA(P1/2T): 4 CM2
BH CV ECHO MEAS - MVA(VTI): 2.7 CM2
BH CV ECHO MEAS - PA ACC TIME: 0.09 SEC
BH CV ECHO MEAS - PA PR(ACCEL): 37.8 MMHG
BH CV ECHO MEAS - PA V2 MAX: 63.6 CM/SEC
BH CV ECHO MEAS - RAP SYSTOLE: 3 MMHG
BH CV ECHO MEAS - RF(MV,LVOT)(1DIAM): 0.51 CM
BH CV ECHO MEAS - RVSP: 19 MMHG
BH CV ECHO MEAS - SI(MOD-SP2): 30 ML/M2
BH CV ECHO MEAS - SI(MOD-SP4): 22 ML/M2
BH CV ECHO MEAS - SV(LVOT): 45.8 ML
BH CV ECHO MEAS - SV(MOD-SP2): 65.4 ML
BH CV ECHO MEAS - SV(MOD-SP4): 47.9 ML
BH CV ECHO MEAS - TAPSE (>1.6): 2.3 CM
BH CV ECHO MEAS - TR MAX PG: 17.1 MMHG
BH CV ECHO MEAS - TR MAX VEL: 206.3 CM/SEC
BH CV ECHO MEASUREMENTS AVERAGE E/E' RATIO: 6.65
BH CV VAS BP LEFT ARM: NORMAL MMHG
BH CV XLRA - RV BASE: 3.5 CM
BH CV XLRA - RV LENGTH: 7.7 CM
BH CV XLRA - RV MID: 2.8 CM
BH CV XLRA - TDI S': 11.6 CM/SEC
IVRT: 100 MSEC
LEFT ATRIUM VOLUME INDEX: 45.9 ML/M2
LV EF 2D ECHO EST: 48 %
MAXIMAL PREDICTED HEART RATE: 135 BPM
STRESS TARGET HR: 115 BPM

## 2022-07-13 PROCEDURE — 93306 TTE W/DOPPLER COMPLETE: CPT | Performed by: INTERNAL MEDICINE

## 2022-07-13 PROCEDURE — 99214 OFFICE O/P EST MOD 30 MIN: CPT | Performed by: INTERNAL MEDICINE

## 2022-07-13 PROCEDURE — 93306 TTE W/DOPPLER COMPLETE: CPT

## 2022-07-13 NOTE — PROGRESS NOTES
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       PHYSICIAN: Anthony Fried MD  PREVIOUS CARDIOLOGIST: Gypsy Gibbs MD  ELECTROPHYSIOLOGIST: Luc Lange MD, [facility], [facility]  ORTHOPEDIST: Vitaly Omer MD  SLEEP MD:  Gypsy Gibbs MD.    Chief Complaint:   Chief Complaint   Patient presents with   • Atrial fibrillation, unspecified type       Problem List:  1. Remote paroxysmal atrial fibrillation with now chronic sustained atrial fibrillation and progressive sick sinus syndrome:  a. Diagnosed in [facility], in April 2014.   b. CHADS-VASc score of 4 with anticoagulation on Xarelto.  c. Successful cardioversion with restoration of sinus rhythm with advanced trifascicular block with IN interval of 292 msec in the setting of CRBB/LAHB, on 04/29/2014, by Dr. Slaughter.   d. Return of atrial fibrillation with Holter monitor.  e. Holter monitor for 48 hours with a minimum heart rate of 42 beats per minute and max of 138 beats per minute, with ventricular ectopy less than 1% of the time and supraventricular ectopy 2% of the time, 03/27/2015.  f. Echocardiogram showing estimated EF of 61% with mild-to-moderate LVH and left atrium slightly dilated, and right ventricle slightly dilated, and moderate aortic cuff sclerosis, and mild aortic regurgitation with mild mitral regurgitation, and mild-to-moderate tricuspid regurgitation.  g. Implantation of [employer] single-chamber permanent pacemaker on 07/13/2015 by Dr. Cano (can and leads are not MRI compatible), with acceptable interrogation and no reprogramming, April 2017, with subsequent acceptable remote check, August 2017, November 2018, with acceptable interrogation, January 2019, July 2019, July 2021.  h. Recent NYHA class II-III exertional dyspnea and fatigue without angina pectoris with abnormal echocardiogram  (May 2019) and subsequent EP assessment and discontinuation of Bystolic and stable abnormal echocardiogram (LVEF 0.39), September 2020  i. Residual class I symptoms, August 2019, April 2020, September 2020, July 2021, January 2022, July 2022  j. Pending echocardiogram 7/13/2022 with preliminary EF 50.4%  2. Syncopal episodes secondary to bradycardia.   3. Hypertension.   4. Dyslipidemia.   5. Diabetes mellitus type 2, hemoglobin  A1c 6.7%, July 2015; 6.6%, December 2018  6. Chronic right bundle branch block.   7. Chronic diastolic heart failure.  8. Degenerative joint disease.   9. Ventral hernia.   10. Chronic kidney disease.   11. Obstructive sleep apnea with the use of CPAP.   12. Recent progressive dysphoria, weakness, exercise intolerance, and fatigue, off CPAP therapy, with recently completed polysomnogram and laboratory studies - data deficit, July 2019  13. Surgical history:  a. Appendectomy.   b. Pilonidal cyst.  c. 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  d. Prostatectomy.       Allergies   Allergen Reactions   • Bystolic [Nebivolol Hcl] Other (See Comments)     Fatigue, Couldn't sleep        Current Outpatient Medications:   •  aspirin 81 MG tablet, Take 81 mg by mouth Daily., Disp: , Rfl:   •  benazepril (LOTENSIN) 20 MG tablet, Take 20 mg by mouth Every Night., Disp: , Rfl:   •  carvedilol (COREG) 6.25 MG tablet, TAKE 1 TABLET BY MOUTH TWICE A DAY, Disp: 180 tablet, Rfl: 3  •  cetirizine (zyrTEC) 10 MG tablet, Take 10 mg by mouth As Needed for Allergies., Disp: , Rfl:   •  docusate sodium (COLACE) 100 MG capsule, Take 100 mg by mouth 2 (Two) Times a Day., Disp: , Rfl:   •  fenofibrate 160 MG tablet, Take 160 mg by mouth Daily., Disp: , Rfl: 0  •  hydroCHLOROthiazide (HYDRODIURIL) 25 MG tablet, TAKE 1 TABLET BY MOUTH EVERY DAY (Patient taking differently: Take 25 mg by mouth Daily.), Disp: 90 tablet, Rfl: 1  •  metFORMIN XR (GLUCOPHAGE-XR) 500 MG 24 hr tablet, Take 500 mg by mouth 2 (two)  "times a day., Disp: , Rfl: 1  •  pravastatin (PRAVACHOL) 40 MG tablet, Take 40 mg by mouth every night at bedtime., Disp: , Rfl:   •  sacubitril-valsartan (ENTRESTO) 24-26 MG tablet, Take 1 tablet by mouth 2 (Two) Times a Day., Disp: 60 tablet, Rfl: 5  •  Xarelto 15 MG tablet, TAKE 1 TABLET BY MOUTH EVERY DAY WITH DINNER, Disp: 90 tablet, Rfl: 3    History of Present Illness: Patient returns for scheduled 6-month follow up.  He is compliant with CPAP.The patient had an echocardiogram before coming to our office today; this will be read, and a letter will be sent to the patient with those results.  Preliminary EF was 50.4%.  The patient thinks he may have been taking benazepril and Entresto at the same time but is not completely sure.  He will call us back after he gets back home and let us know.  He denies any chest pain, increased shortness of breath, dizziness, palpitations, presyncope, syncope, or edema.  He walks 1.5 miles around the [facility] 3 times a week. Patient has had no interim ER visits, hospitalizations, serious illnesses, or surgeries. He is accompanied to the office today by his wife, who confirms his history.        ROS   Obtained and negative except as outlined in problem list and HPI.    Procedures       Objective:       Vitals:    07/13/22 1503 07/13/22 1504   BP: 144/79 140/76   BP Location: Right arm Right arm   Patient Position: Sitting Standing   Pulse: 73 72   SpO2: 99% 99%   Weight: 95.3 kg (210 lb)    Height: 177.8 cm (70\")    Recheck blood pressure right arm sitting was 128/62  Body mass index is 30.13 kg/m².  Last weight: 221 lbs    Vitals reviewed.   Constitutional:       Appearance: Well-developed.   Neck:      Thyroid: No thyromegaly.      Vascular: No carotid bruit or JVD.      Lymphadenopathy: No cervical adenopathy.   Pulmonary:      Effort: Pulmonary effort is normal.      Breath sounds: Normal breath sounds. No wheezing. No rhonchi. No rales.   Cardiovascular:      Regular rhythm. "      Murmurs: There is a grade 2/6 systolic murmur at the LLSB.      No gallop. No S3 gallop.   Pulses:     Dorsalis pedis: 2+ bilaterally.     Posterior tibial: 2+ bilaterally.  Edema:     Peripheral edema absent.   Abdominal:      Palpations: Abdomen is soft. There is no abdominal mass.      Tenderness: There is no abdominal tenderness.   Musculoskeletal: Normal range of motion. Skin:     General: Skin is warm and dry.      Findings: No rash.      Comments: Left precordial pacemaker site nominal   Neurological:      Mental Status: Alert and oriented to person, place, and time.       Lab Review:     No recent laboratory studies available for review today.    · MURJ, 30 June 2022  · [employer] K172  · Normal Device Function  · Events or Alerts: 4- longest 18 seconds   · Battery: Battery is at 51%, 3.50 yrs  · Sensing, impedance and thresholds reviewed  · Programmed parameters reviewed  · Presenting rhythm reviewed  · Heart Rate Histograms reviewed  · Implant Date: 13 July 2015    [employer] single-chamber pacemaker interrogation 7/13/2022: 3.5 years battery longevity, 11 episodes of NSVT all for less than 10 beats around 150-190 bpm.  No reprogramming.        Assessment:       Overall continued acceptable course with no new interim cardiopulmonary complaints with acceptable functional status. We will defer additional diagnostic or therapeutic intervention from a cardiac perspective at this time.  He had an acceptable device interrogation in office today with no reprogramming.The patient had an echocardiogram before coming to our office today; this will be read, and a letter will be sent to the patient with those results.  His preliminary EF was 50.4%. He may have been taking both benazepril and Entresto at the same time.  He was advised to stop his benazepril.  He is going to call back after he gets back home to check his medications and let us know.     Diagnosis Plan   1. Chronic systolic congestive  heart failure (HCC)  No recurrent angina pectoris or CHF on current activity schedule; continue current treatment   2. Permanent atrial fibrillation (HCC)  Stable   3. Primary hypertension  Controlled, continue current cardiac medications   4. Sick sinus syndrome (HCC)  Acceptable device interrogation in office with no reprogramming   5. Type 2 diabetes mellitus without complication, without long-term current use of insulin (HCC)  No new labs to review, continue heart healthy diet and physical activity as tolerated   6. CANDY on CPAP  Continued compliance          Plan:         1. Patient to continue current medications and close follow up with the above providers.  2. Tentative cardiology follow up in January 2023 or patient may return sooner PRN.  3. [facility] device interrogation in office-acceptable and done  4. The patient had an echocardiogram before coming to our office today; this will be read, and a letter will be sent to the patient with those results.    Scribed for Daniel Slaughter MD by Kathleen Mccray, UGO. 7/13/2022  15:03 EDT      I, Daniel lSaughter MD, [facility], personally performed the services described in this documentation as scribed by the above named individual in my presence, and it is both accurate and complete. At 15:55 EDT on 07/13/2022

## 2022-07-14 ENCOUNTER — TELEPHONE (OUTPATIENT)
Dept: CARDIOLOGY | Facility: CLINIC | Age: 87
End: 2022-07-14

## 2022-07-14 RX ORDER — SACUBITRIL AND VALSARTAN 24; 26 MG/1; MG/1
1 TABLET, FILM COATED ORAL 2 TIMES DAILY
Qty: 60 TABLET
Start: 2022-07-14 | End: 2022-11-17 | Stop reason: SDUPTHER

## 2022-07-14 NOTE — TELEPHONE ENCOUNTER
Noted and thank you for the update; would continue Entresto 24/26 twice daily at this time.    Thanks!

## 2022-07-14 NOTE — TELEPHONE ENCOUNTER
Patient's wife called and stated that he has not been taking benazepril.      Would you like pt to stay on Entresto 24-26 mg BID?    Please advise.

## 2022-07-18 RX ORDER — CARVEDILOL 6.25 MG/1
TABLET ORAL
Qty: 180 TABLET | Refills: 3 | Status: SHIPPED | OUTPATIENT
Start: 2022-07-18

## 2022-08-08 RX ORDER — HYDROCHLOROTHIAZIDE 25 MG/1
TABLET ORAL
Qty: 90 TABLET | Refills: 1 | Status: SHIPPED | OUTPATIENT
Start: 2022-08-08 | End: 2023-03-02

## 2022-10-09 PROCEDURE — 93296 REM INTERROG EVL PM/IDS: CPT | Performed by: STUDENT IN AN ORGANIZED HEALTH CARE EDUCATION/TRAINING PROGRAM

## 2022-10-09 PROCEDURE — 93294 REM INTERROG EVL PM/LDLS PM: CPT | Performed by: STUDENT IN AN ORGANIZED HEALTH CARE EDUCATION/TRAINING PROGRAM

## 2022-11-17 RX ORDER — SACUBITRIL AND VALSARTAN 24; 26 MG/1; MG/1
1 TABLET, FILM COATED ORAL 2 TIMES DAILY
Qty: 180 TABLET | Refills: 3
Start: 2022-11-17 | End: 2023-01-20 | Stop reason: SDUPTHER

## 2023-01-03 NOTE — PROGRESS NOTES
Subjective:     Encounter Date:01/25/2023      Patient ID: Micha Crockett is a 87 y.o.  white male, a / retired farmer, from [place], [place].       PHYSICIAN: Anthony Fried MD  PREVIOUS CARDIOLOGIST: Gypsy Gibbs MD  ELECTROPHYSIOLOGIST: Luc Lange MD, [facility], [facility] [[email or web address]]  ORTHOPEDIST: Vitaly Omer MD  SLEEP MD:  Gypsy Gibbs MD.    Chief Complaint:   Chief Complaint   Patient presents with   • Chronic systolic congestive heart failure (HCC)     Problem List:  1. Remote paroxysmal atrial fibrillation with now chronic sustained atrial fibrillation and progressive sick sinus syndrome:  a. Diagnosed in [facility], in April 2014.   b. CHADS-VASc score of 4 with anticoagulation on Xarelto.  c. Successful cardioversion with restoration of sinus rhythm with advanced trifascicular block with VT interval of 292 msec in the setting of CRBB/LAHB, on 04/29/2014, by Dr. Slaughter.   d. Return of atrial fibrillation with Holter monitor.  e. Holter monitor for 48 hours with a minimum heart rate of 42 beats per minute and max of 138 beats per minute, with ventricular ectopy less than 1% of the time and supraventricular ectopy 2% of the time, 03/27/2015.  f. Echocardiogram showing estimated EF of 61% with mild-to-moderate LVH and left atrium slightly dilated, and right ventricle slightly dilated, and moderate aortic cuff sclerosis, and mild aortic regurgitation with mild mitral regurgitation, and mild-to-moderate tricuspid regurgitation.  g. Implantation of [employer] single-chamber permanent pacemaker on 07/13/2015 by Dr. Cano (can and leads are not MRI compatible), with acceptable interrogation and no reprogramming, April 2017, with subsequent acceptable remote check, August 2017, November 2018, with acceptable interrogation, January 2019, July 2019, July 2021.  h. Recent NYHA class II-III exertional dyspnea and fatigue without angina pectoris with abnormal  echocardiogram (May 2019) and subsequent EP assessment and discontinuation of Bystolic and stable abnormal echocardiogram (LVEF 0.39), September 2020  i. Residual class I symptoms, August 2019, April 2020, September 2020, July 2021, January 2022, July 2022  j. Echocardiogram 7/13/2022: LVEF 48%, LA volume moderately increased, RVSP less than 35 mmHg, mild dilation of the aortic root and of the sinuses of Valsalva present, the following left ventricular wall segments are hypokinetic: mid anterior, basal anterolateral, mid anterolateral, basal inferolateral, mid inferolateral, apical inferior, mid inferior, basal inferoseptal, mid inferoseptal, apex hypokinetic, mid anteroseptal, basal anterior, basal inferior and basal inferoseptal. The following left ventricular wall segments are akinetic: apical anterior, apical lateral and apical septal.  Normal RV cavity size, wall thickness, and systolic function noted.  Aortic valve exhibits mild to moderate sclerosis, and anterior apical left ventricular aneurysm is present.  Improved EF compared to September 2020 echocardiographic study report  k. Residual class I symptoms January 2023  2. Syncopal episodes secondary to bradycardia.   3. Hypertension.   4. Dyslipidemia.   5. Diabetes mellitus type 2, hemoglobin  A1c 6.7%, July 2015; 6.6%, December 2018  6. Chronic right bundle branch block.   7. Chronic diastolic heart failure.  8. Mild obesity: BMI 31.16  9. Degenerative joint disease.   10. Ventral hernia.   11. Chronic kidney disease.   12. Obstructive sleep apnea with the use of CPAP.   13. Recent progressive dysphoria, weakness, exercise intolerance, and fatigue, off CPAP therapy, with recently completed polysomnogram and laboratory studies - data deficit, July 2019  14. Surgical history:  a. Appendectomy.   b. Pilonidal cyst.  c. 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  d. Prostatectomy.   e. Pacemaker implantation     Allergies   Allergen Reactions   •  Bystolic [Nebivolol Hcl] Other (See Comments)     Fatigue, Couldn't sleep        Current Outpatient Medications   Medication Instructions   • aspirin 81 mg, Oral, Daily   • carvedilol (COREG) 6.25 MG tablet TAKE 1 TABLET BY MOUTH TWICE A DAY   • cetirizine (ZYRTEC) 10 mg, Oral, As Needed   • docusate sodium (COLACE) 100 mg, Oral, 2 Times Daily   • fenofibrate 160 mg, Oral, Daily   • hydroCHLOROthiazide (HYDRODIURIL) 25 MG tablet TAKE 1 TABLET BY MOUTH EVERY DAY   • metFORMIN ER (GLUCOPHAGE-XR) 500 mg, Oral, 2 times daily   • pravastatin (PRAVACHOL) 40 mg, Oral, Every Night at Bedtime   • sacubitril-valsartan (Entresto) 24-26 MG tablet 1 tablet, Oral, 2 Times Daily   • Xarelto 15 MG tablet TAKE 1 TABLET BY MOUTH EVERY DAY WITH DINNER         HISTORY OF PRESENT ILLNESS:  The patient is here for 6-month follow-up. Echocardiogram 7/13/2022 showed LVEF 48%, LA volume moderately increased, RVSP less than 35 mmHg, mild dilation of the aortic root and of the sinuses of Valsalva present, aortic valve exhibits mild to moderate sclerosis, and anterior apical left ventricular aneurysm is present.The patient had an acceptable remote device interrogation December 2022.  He does not check his blood pressure at home.  He denies any chest pain, shortness of breath, palpitations, dizziness, presyncope, syncope, or edema.  He is planning on  increasing his walking soon to lose weight.  He has not had any recent laboratory studies.  He has questions regarding his prior authorization for Entresto.  He is compliant with CPAP nightly.      ROS   All other systems reviewed and otherwise negative.      ECG 12 Lead    Date/Time: 1/25/2023 12:50 PM  Performed by: Kathleen Mccray APRN  Authorized by: Kathleen Mccray APRN   Rhythm comments: Electronic ventricular pacemaker, 70 bpm,  ms,  ms, paced compared to ECG in August 2019                 Objective:       Vitals:    01/25/23 1003 01/25/23 1004   BP: 128/74 129/69   BP  "Location: Left arm Left arm   Patient Position: Sitting Standing   Pulse: 74 74   SpO2: 98%    Weight: 95.7 kg (211 lb)    Height: 175.3 cm (69\")      Body mass index is 31.16 kg/m².  Last weight July 2022 was 210 pounds  Constitutional:       Appearance: Healthy appearance. Not in distress.   Neck:      Vascular: No JVR. JVD normal.   Pulmonary:      Effort: Pulmonary effort is normal.      Breath sounds: Normal breath sounds. No wheezing. No rhonchi. No rales.   Chest:      Chest wall: Not tender to palpatation.   Cardiovascular:      PMI at left midclavicular line. Normal rate. Regular rhythm. Normal S1. Normal S2.      Murmurs: There is a grade 2/6 systolic murmur at the LLSB.      No gallop. No click. No rub.   Pulses:     Radial: 1+ on the left side.     Dorsalis pedis: 1+ bilaterally.     Posterior tibial: 1+ bilaterally.  Edema:     Peripheral edema absent.   Abdominal:      General: Bowel sounds are normal.      Palpations: Abdomen is soft.      Tenderness: There is no abdominal tenderness.   Musculoskeletal: Normal range of motion.         General: No tenderness. Skin:     General: Skin is warm and dry.      Comments: Left precordial pacemaker site nominal   Neurological:      General: No focal deficit present.      Mental Status: Alert and oriented to person, place and time.           Lab Review:   Lab Results   Component Value Date    GLUCOSE 107 (H) 07/15/2015    BUN 28 (H) 07/15/2015    CREATININE 1.4 (H) 07/15/2015    CO2 29 07/15/2015    CALCIUM 8.8 07/15/2015    ALBUMIN 4.0 07/12/2015    AST 17 07/12/2015    ALT 16 07/12/2015       Lab Results   Component Value Date    WBC 10.47 07/14/2015    HGB 12.0 (L) 07/14/2015    HCT 37.3 (L) 07/14/2015    MCV 86.9 07/14/2015     07/14/2015       Lab Results   Component Value Date    HGBA1C 6.7 (H) 07/12/2015       Lab Results   Component Value Date    TSH 3.548 07/12/2015         Lab Results   Component Value Date     (H) 07/12/2015     MURJ " 12/29/2022: 100% RV pacing, battery with 3.5 years longevity    Advance Care Planning   ACP discussion was held with the patient during this visit. Patient does not have an advance directive, declines further assistance.     [employer] single-chamber pacemaker interrogation 1/25/2023: 100% RV pacing, 3.5-4 years battery longevity        Assessment:     Overall continued acceptable course with no new interim cardiopulmonary complaints with acceptable functional status. We will defer additional diagnostic or therapeutic intervention from a cardiac perspective at this time.Echocardiogram 7/13/2022 showed LVEF 48%, LA volume moderately increased, RVSP less than 35 mmHg, mild dilation of the aortic root and of the sinuses of Valsalva present, aortic valve exhibits mild to moderate sclerosis, and anterior apical left ventricular aneurysm is present.  The patient had an acceptable device interrogation today with no reprogramming.  Hopefully I will get to review the patient's next laboratory testing results.       Diagnosis Plan   1. Chronic systolic congestive heart failure (HCC)  No recurrent angina pectoris or CHF on current activity schedule; continue current treatment      2. Permanent atrial fibrillation (HCC)  Stable, continue Xarelto, Coreg      3. Primary hypertension   Controlled, continue current cardiac medications      4. Dyslipidemia   No new labs to review, continue pravastatin, fenofibrate      5. Type 2 diabetes mellitus without complication, without long-term current use of insulin (HCC)   No new labs to review, continue heart healthy diet and physical activity as tolerated      6. CANDY on CPAP   Continue CPAP             Plan:         1. Patient to continue current medications and close follow up with the above providers.  2. Tentative cardiology follow up in July 2023 or patient may return sooner PRN.  3. [employer] pacemaker check next visit    Electronically signed by Kathleen Mccray  UGO, 01/25/23, 12:55 PM EST.

## 2023-01-08 PROCEDURE — 93296 REM INTERROG EVL PM/IDS: CPT | Performed by: STUDENT IN AN ORGANIZED HEALTH CARE EDUCATION/TRAINING PROGRAM

## 2023-01-08 PROCEDURE — 93294 REM INTERROG EVL PM/LDLS PM: CPT | Performed by: STUDENT IN AN ORGANIZED HEALTH CARE EDUCATION/TRAINING PROGRAM

## 2023-01-17 ENCOUNTER — TELEPHONE (OUTPATIENT)
Dept: CARDIOLOGY | Facility: CLINIC | Age: 88
End: 2023-01-17
Payer: MEDICARE

## 2023-01-20 RX ORDER — SACUBITRIL AND VALSARTAN 24; 26 MG/1; MG/1
1 TABLET, FILM COATED ORAL 2 TIMES DAILY
Qty: 180 TABLET | Refills: 3
Start: 2023-01-20 | End: 2023-01-25 | Stop reason: SDUPTHER

## 2023-01-25 ENCOUNTER — OFFICE VISIT (OUTPATIENT)
Dept: CARDIOLOGY | Facility: CLINIC | Age: 88
End: 2023-01-25
Payer: MEDICARE

## 2023-01-25 VITALS
OXYGEN SATURATION: 98 % | BODY MASS INDEX: 31.25 KG/M2 | HEART RATE: 74 BPM | HEIGHT: 69 IN | DIASTOLIC BLOOD PRESSURE: 69 MMHG | SYSTOLIC BLOOD PRESSURE: 129 MMHG | WEIGHT: 211 LBS

## 2023-01-25 DIAGNOSIS — E11.9 TYPE 2 DIABETES MELLITUS WITHOUT COMPLICATION, WITHOUT LONG-TERM CURRENT USE OF INSULIN: ICD-10-CM

## 2023-01-25 DIAGNOSIS — I10 PRIMARY HYPERTENSION: ICD-10-CM

## 2023-01-25 DIAGNOSIS — I50.22 CHRONIC SYSTOLIC CONGESTIVE HEART FAILURE: Primary | ICD-10-CM

## 2023-01-25 DIAGNOSIS — E78.5 DYSLIPIDEMIA: ICD-10-CM

## 2023-01-25 DIAGNOSIS — G47.33 OSA ON CPAP: ICD-10-CM

## 2023-01-25 DIAGNOSIS — I48.21 PERMANENT ATRIAL FIBRILLATION: ICD-10-CM

## 2023-01-25 DIAGNOSIS — Z99.89 OSA ON CPAP: ICD-10-CM

## 2023-01-25 PROCEDURE — 99214 OFFICE O/P EST MOD 30 MIN: CPT | Performed by: NURSE PRACTITIONER

## 2023-01-25 PROCEDURE — 93279 PRGRMG DEV EVAL PM/LDLS PM: CPT | Performed by: NURSE PRACTITIONER

## 2023-01-25 PROCEDURE — 93000 ELECTROCARDIOGRAM COMPLETE: CPT | Performed by: NURSE PRACTITIONER

## 2023-01-25 RX ORDER — SACUBITRIL AND VALSARTAN 24; 26 MG/1; MG/1
1 TABLET, FILM COATED ORAL 2 TIMES DAILY
Qty: 180 TABLET | Refills: 3 | Status: SHIPPED | OUTPATIENT
Start: 2023-01-25

## 2023-03-02 RX ORDER — HYDROCHLOROTHIAZIDE 25 MG/1
25 TABLET ORAL DAILY
Qty: 90 TABLET | Refills: 0 | Status: SHIPPED | OUTPATIENT
Start: 2023-03-02

## 2023-04-09 PROCEDURE — 93294 REM INTERROG EVL PM/LDLS PM: CPT | Performed by: STUDENT IN AN ORGANIZED HEALTH CARE EDUCATION/TRAINING PROGRAM

## 2023-04-09 PROCEDURE — 93296 REM INTERROG EVL PM/IDS: CPT | Performed by: STUDENT IN AN ORGANIZED HEALTH CARE EDUCATION/TRAINING PROGRAM

## 2023-04-10 RX ORDER — RIVAROXABAN 15 MG/1
TABLET, FILM COATED ORAL
Qty: 90 TABLET | Refills: 3 | Status: SHIPPED | OUTPATIENT
Start: 2023-04-10

## 2023-05-12 RX ORDER — CARVEDILOL 6.25 MG/1
TABLET ORAL
Qty: 180 TABLET | Refills: 3 | Status: SHIPPED | OUTPATIENT
Start: 2023-05-12

## 2023-06-05 RX ORDER — HYDROCHLOROTHIAZIDE 25 MG/1
25 TABLET ORAL DAILY
Qty: 90 TABLET | Refills: 0 | OUTPATIENT
Start: 2023-06-05

## 2023-07-18 NOTE — PROGRESS NOTES
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       PHYSICIAN: Anthony Fried MD  PREVIOUS CARDIOLOGIST: Gypsy Gibbs MD  ELECTROPHYSIOLOGIST: Luc Lange MD, [facility], [facility]  ORTHOPEDIST: Vitaly Omer MD  SLEEP MD:  Gypsy Gibbs MD.    Chief Complaint:   Chief Complaint   Patient presents with    Chronic systolic congestive heart failure     Problem List:  Remote paroxysmal atrial fibrillation with now chronic sustained atrial fibrillation and progressive sick sinus syndrome:  Diagnosed in [facility], in April 2014.   CHADS-VASc score of 4 with anticoagulation on Xarelto.  Successful cardioversion with restoration of sinus rhythm with advanced trifascicular block with MI interval of 292 msec in the setting of CRBB/LAHB, on 04/29/2014, by Dr. Slaughter.   Return of atrial fibrillation with Holter monitor.  Holter monitor for 48 hours with a minimum heart rate of 42 beats per minute and max of 138 beats per minute, with ventricular ectopy less than 1% of the time and supraventricular ectopy 2% of the time, 03/27/2015.  Echocardiogram showing estimated EF of 61% with mild-to-moderate LVH and left atrium slightly dilated, and right ventricle slightly dilated, and moderate aortic cuff sclerosis, and mild aortic regurgitation with mild mitral regurgitation, and mild-to-moderate tricuspid regurgitation.  Implantation of [place] [employer] single-chamber permanent pacemaker on 07/13/2015 by Dr. Cano (can and leads are not MRI compatible), with acceptable interrogation and no reprogramming, April 2017, with subsequent acceptable remote check, August 2017, November 2018, with acceptable interrogation, January 2019, July 2019, July 2021.  Recent NYHA class II-III exertional dyspnea and fatigue without angina pectoris with abnormal echocardiogram (May 2019) and  subsequent EP assessment and discontinuation of Bystolic and stable abnormal echocardiogram (LVEF 0.39), September 2020  Residual class I symptoms, August 2019, April 2020, September 2020, July 2021, January 2022, July 2022  Echocardiogram 7/13/2022: LVEF 48%, LA volume moderately increased, RVSP less than 35 mmHg, mild dilation of the aortic root and of the sinuses of Valsalva present, the following left ventricular wall segments are hypokinetic: mid anterior, basal anterolateral, mid anterolateral, basal inferolateral, mid inferolateral, apical inferior, mid inferior, basal inferoseptal, mid inferoseptal, apex hypokinetic, mid anteroseptal, basal anterior, basal inferior and basal inferoseptal. The following left ventricular wall segments are akinetic: apical anterior, apical lateral and apical septal.  Normal RV cavity size, wall thickness, and systolic function noted.  Aortic valve exhibits mild to moderate sclerosis, and anterior apical left ventricular aneurysm is present.  Improved EF compared to September 2020 echocardiographic study report  Residual class I symptoms January 2023, July 2023  Syncopal episodes secondary to bradycardia.   Hypertension.   Dyslipidemia.   Diabetes mellitus type 2, hemoglobin  A1c 6.7%, July 2015; 6.6%, December 2018  Chronic right bundle branch block.   Chronic diastolic heart failure.  Mild obesity: BMI 31.16  Degenerative joint disease.   Ventral hernia.   Chronic kidney disease.   Obstructive sleep apnea with the use of CPAP.   Recent progressive dysphoria, weakness, exercise intolerance, and fatigue, off CPAP therapy, with recently completed polysomnogram and laboratory studies - data deficit, July 2019  Surgical history:  Appendectomy.   Pilonidal cyst.  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  Prostatectomy.   Pacemaker implantation    Allergies   Allergen Reactions    Bystolic [Nebivolol Hcl] Other (See Comments)     Fatigue, Couldn't sleep        Current  "Outpatient Medications   Medication Instructions    aspirin 81 mg, Oral, Daily    carvedilol (COREG) 6.25 MG tablet TAKE 1 TABLET BY MOUTH TWICE  DAILY    cetirizine (ZYRTEC) 10 mg, Oral, As Needed    docusate sodium (COLACE) 100 mg, Oral, 2 Times Daily    fenofibrate 160 mg, Oral, Daily    hydroCHLOROthiazide (HYDRODIURIL) 25 mg, Oral, Daily, NEED LABS FOR FURTHER REFILLS    metFORMIN ER (GLUCOPHAGE-XR) 500 mg, Oral, 2 times daily    pravastatin (PRAVACHOL) 40 mg, Oral, Every Night at Bedtime    sacubitril-valsartan (Entresto) 24-26 MG tablet 1 tablet, Oral, 2 Times Daily    Xarelto 15 MG tablet TAKE 1 TABLET BY MOUTH DAILY  WITH DINNER         HISTORY OF PRESENT ILLNESS:  The patient is here for 6-month follow-up. Echocardiogram 7/13/2022 showed LVEF 48%, LA volume moderately increased, RVSP less than 35 mmHg, mild dilation of the aortic root and of the sinuses of Valsalva present, aortic valve exhibits mild to moderate sclerosis, and anterior apical left ventricular aneurysm is present. He is compliant with CPAP nightly.  He denies any chest pain, shortness of breath, palpitations, dizziness, presyncope, or syncope.  He feels like his balance is \"not as good as it used to be. \"  He tries to take his time so he does not have any falls.  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  He had laboratory testing a few months ago and will have the results of this sent to me for review.  His blood pressures at home are around 120-125 systolic.         ROS   All other systems reviewed and otherwise negative.    Procedures       Objective:       Vitals:    07/26/23 0958 07/26/23 1007 07/26/23 1043   BP: 138/86 129/75 120/64   BP Location: Left arm Left arm Left arm   Patient Position: Sitting Standing Sitting   Cuff Size: Adult Adult    Pulse: 71 71    SpO2: 99% 99%    Weight: 93.9 kg (207 lb)     Height: 177.8 cm (70\")       Body mass index is 29.7 kg/m².  Last weight January 2023 was 211 pounds  Constitutional:       " Appearance: Healthy appearance. Not in distress.   Neck:      Vascular: No JVR. JVD normal.   Pulmonary:      Effort: Pulmonary effort is normal.      Breath sounds: Normal breath sounds. No wheezing. No rhonchi. No rales.   Chest:      Chest wall: Not tender to palpatation.   Cardiovascular:      PMI at left midclavicular line. Normal rate. Regular rhythm. Normal S1. Normal S2.       Murmurs: There is a systolic murmur at the LLSB.      No gallop.  No click. No rub.   Pulses:     Dorsalis pedis: 2+ bilaterally.     Posterior tibial: 2+ bilaterally.  Edema:     Peripheral edema absent.   Abdominal:      General: Bowel sounds are normal.      Palpations: Abdomen is soft.      Tenderness: There is no abdominal tenderness.   Musculoskeletal: Normal range of motion.         General: No tenderness. Skin:     General: Skin is warm and dry.      Comments: Left precordial pacemaker site nominal   Neurological:      General: No focal deficit present.      Mental Status: Alert and oriented to person, place and time.         Lab Review:   Lab Results   Component Value Date    GLUCOSE 107 (H) 07/15/2015    BUN 28 (H) 07/15/2015    CREATININE 1.4 (H) 07/15/2015    CO2 29 07/15/2015    CALCIUM 8.8 07/15/2015    ALBUMIN 4.0 07/12/2015    AST 17 07/12/2015    ALT 16 07/12/2015       Lab Results   Component Value Date    WBC 10.47 07/14/2015    HGB 12.0 (L) 07/14/2015    HCT 37.3 (L) 07/14/2015    MCV 86.9 07/14/2015     07/14/2015       Lab Results   Component Value Date    HGBA1C 6.7 (H) 07/12/2015       Lab Results   Component Value Date    TSH 3.548 07/12/2015         Lab Results   Component Value Date     (H) 07/12/2015     Advance Care Planning   ACP discussion was held with the patient during this visit. Patient has an advance directive (not in EMR), copy requested.      [facility] [place] PPM 7/26/2023 interrogation: 100% RV pacing, 3 years battery longevity, 17 VHR events around 12 beats, patient dependent, no  reprogramming        Assessment:     Overall continued acceptable course with no new interim cardiopulmonary complaints with acceptable functional status on limited activity. We will defer additional diagnostic or therapeutic intervention from a cardiac perspective at this time. He had an acceptable device interrogation in office today with no reprogramming. Echocardiogram 7/13/2022 showed LVEF 48%, LA volume moderately increased, RVSP less than 35 mmHg, mild dilation of the aortic root and of the sinuses of Valsalva present, aortic valve exhibits mild to moderate sclerosis, and anterior apical left ventricular aneurysm is present.  EF had improved compared to previous echocardiogram.  Would continue GDMT.  Hopefully I will be to review the patient's last laboratory testing results.     Diagnosis Plan   1. Chronic systolic congestive heart failure  No recurrent angina pectoris or CHF on current activity schedule; continue current treatment       2. Primary hypertension  Controlled, continue current cardiac medications      3. Dyslipidemia  No new labs to review, continue fenofibrate, pravastatin      4. Type 2 diabetes mellitus without complication, without long-term current use of insulin  No new labs to review, continue heart healthy diet and physical activity as tolerated      5. CANDY on CPAP  Continue CPAP      6. Pacemaker  Pacemaker dependent, acceptable device interrogation with no reprogramming             Plan:         Patient to continue current medications and close follow up with the above providers.  Tentative cardiology follow up in February 2024 or patient may return sooner PRN.  [facility] PPM check next visit    Electronically signed by UGO Zaragoza, 07/26/23, 10:44 AM EDT.

## 2023-07-26 ENCOUNTER — OFFICE VISIT (OUTPATIENT)
Dept: CARDIOLOGY | Facility: CLINIC | Age: 88
End: 2023-07-26
Payer: MEDICARE

## 2023-07-26 VITALS
WEIGHT: 207 LBS | BODY MASS INDEX: 29.63 KG/M2 | DIASTOLIC BLOOD PRESSURE: 64 MMHG | OXYGEN SATURATION: 99 % | SYSTOLIC BLOOD PRESSURE: 120 MMHG | HEIGHT: 70 IN | HEART RATE: 71 BPM

## 2023-07-26 DIAGNOSIS — I50.22 CHRONIC SYSTOLIC CONGESTIVE HEART FAILURE: Primary | ICD-10-CM

## 2023-07-26 DIAGNOSIS — E78.5 DYSLIPIDEMIA: ICD-10-CM

## 2023-07-26 DIAGNOSIS — I10 PRIMARY HYPERTENSION: ICD-10-CM

## 2023-07-26 DIAGNOSIS — E11.9 TYPE 2 DIABETES MELLITUS WITHOUT COMPLICATION, WITHOUT LONG-TERM CURRENT USE OF INSULIN: ICD-10-CM

## 2023-07-26 DIAGNOSIS — G47.33 OSA ON CPAP: ICD-10-CM

## 2023-07-26 DIAGNOSIS — Z95.0 PACEMAKER: ICD-10-CM

## 2023-07-26 DIAGNOSIS — Z99.89 OSA ON CPAP: ICD-10-CM

## 2023-08-17 ENCOUNTER — OFFICE VISIT (OUTPATIENT)
Dept: CARDIOLOGY | Facility: CLINIC | Age: 88
End: 2023-08-17
Payer: MEDICARE

## 2023-08-17 VITALS
WEIGHT: 210 LBS | BODY MASS INDEX: 30.06 KG/M2 | SYSTOLIC BLOOD PRESSURE: 122 MMHG | HEIGHT: 70 IN | OXYGEN SATURATION: 99 % | HEART RATE: 74 BPM | DIASTOLIC BLOOD PRESSURE: 64 MMHG

## 2023-08-17 DIAGNOSIS — G47.31 CENTRAL SLEEP APNEA: Primary | ICD-10-CM

## 2023-08-17 NOTE — ASSESSMENT & PLAN NOTE
Baseline AHI is very severe at 50.  Long history of complex sleep.  He has been on ASV for many years.    He reports that his PAP device has been replaced through the [employer] recall.    He denies any excessive daytime sleepiness or sleepiness when driving.    Download is reviewed and noted AHI is elevated although his titration study on these pressures did show good improvement.  Noted that his AHI has been elevated on downloads in the past.  This appears to be a normal download.  His compliance is excellent.    He is benefiting from ASV device and we plan to continue his ASV device.    Prescription to DME of his choice for supplies.    Plan follow-up in 1 year or sooner for any central sleep apnea or ASV concerns.

## 2023-08-17 NOTE — PROGRESS NOTES
Follow-Up Sleep Consult     Date:   2023  Name: Micha Crockett  :   1935  PCP: Rena Corbett APRN    Chief Complaint   Patient presents with    Sleep Apnea     Pt here for annual CANDY follow up.       Subjective     History of Present Illness  Micha Crockett is a 88 y.o. male who presents today for follow-up on CANDY.    Sleep history:  CANDY AHI 50 with complex sleep on 3/6/2014    Titration study 2014 to ASV max 25, MN 6 and max 15, PS 6-15, auto breath rate    Periodic leg movements of sleep moderate    Current CSA treatment:    Coexisting conditions: A-fib/PM/CHF       Current mask used is Nasal Cushion     Device Functioning Well: Yes - he reports that he is on Replacement that was replaced in .   Mask Fit Comfortable: Yes  Air Flow Comfortable: Yes  DME Helpful for Supplies: Yes  Sleep is rested: Yes, ;but more recently he is waking up earlier than his normal. 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 He reports that this has been on his mind more recently. He is not a nap taker. He has no sleepiness driving.         Device Download:           The patient's relevant past medical, surgical, family, and social history reviewed and updated in Epic as appropriate.    Past Medical History:   Diagnosis Date    Atrial fibrillation 10/11/2016    Recently diagnosed in [facility], in 2014.  CHADS-VASc score of 4 with anticoagulation on Xarelto. Successful cardioversion with restoration of sinus rhythm with advanced trifascicular block with ID interval of 292 msec in the setting of CRBB/LAHB, on 2014, by Dr. Slaughter.  Return of atrial fibrillation with Holter monitor. Holter monitor for 48 hours with a minimum heart rate of 42 beats per minute and max of 138 beats per minute, with ventricular ectopy less than 1% of the time and supraventricular ectopy 2% of the time, 2015. Echocardiogram showing estimated EF of  61% with mild-to-moderate LVH and left atrium slightly dilated, and right ventricle slightly dilated, and moderate aortic cuff sclerosis, and mild aortic regurgitation with mild mitral regurgitation, and mild-to-moderate tricuspid regurgitation. Implantation of  a [employer] single-chamber permanent pacemaker on 07/13/2015 by Dr. Cano.    CKD (chronic kidney disease) 10/11/2016    Diabetes mellitus, type 2 10/11/2016    DJD (degenerative joint disease) 10/11/2016    Dyslipidemia 10/11/2016    Fall 30 nov 2020    Hypertension 10/11/2016    CANDY on CPAP 10/11/2016    baseline AHI 21    Right bundle branch block 10/11/2016    Syncopal episodes 10/11/2016    episodes secondary to bradycardia.     Ventral hernia 10/11/2016     Past Surgical History:   Procedure Laterality Date    AMPUTATION      Traumatic amputation of the finger with reattachment.    APPENDECTOMY      OTHER SURGICAL HISTORY      Pilonidal cyst    PROSTATECTOMY         Allergies   Allergen Reactions    Bystolic [Nebivolol Hcl] Other (See Comments)     Fatigue, Couldn't sleep      Prior to Admission medications    Medication Sig Start Date End Date Taking? Authorizing Provider   aspirin 81 MG tablet Take 1 tablet by mouth Daily.   Yes Alize Humphrey MD   carvedilol (COREG) 6.25 MG tablet TAKE 1 TABLET BY MOUTH TWICE  DAILY 5/12/23  Yes Kathleen Mccray APRN   cetirizine (zyrTEC) 10 MG tablet Take 1 tablet by mouth As Needed for Allergies.   Yes Alize Humphrey MD   docusate sodium (COLACE) 100 MG capsule Take 1 capsule by mouth 2 (Two) Times a Day.   Yes Alize Humphrey MD   fenofibrate 160 MG tablet Take 1 tablet by mouth Daily. 8/28/16  Yes Alize Humphrey MD   metFORMIN XR (GLUCOPHAGE-XR) 500 MG 24 hr tablet Take 1 tablet by mouth 2 (two) times a day. 9/8/16  Yes Alize Humphrey MD   pravastatin (PRAVACHOL) 40 MG tablet Take 1 tablet by mouth every night at bedtime. 5/3/21  Yes Alize Humphrey MD  "  sacubitril-valsartan (Entresto) 24-26 MG tablet Take 1 tablet by mouth 2 (Two) Times a Day. 1/25/23  Yes Kathleen Mccray APRN   Xarelto 15 MG tablet TAKE 1 TABLET BY MOUTH DAILY  WITH DINNER 4/10/23  Yes Kathleen Mccray APRN   hydroCHLOROthiazide (HYDRODIURIL) 25 MG tablet Take 1 tablet by mouth Daily. NEED LABS FOR FURTHER REFILLS 3/2/23 8/17/23  Kathleen Mccray APRN     Family History   Problem Relation Age of Onset    Stroke Mother     No Known Problems Father        Objective     Vital Signs:  /64 (BP Location: Right arm, Patient Position: Sitting, Cuff Size: Adult)   Pulse 74   Ht 177.8 cm (70\")   Wt 95.3 kg (210 lb)   SpO2 99%   BMI 30.13 kg/mý     BMI is >= 30 and <35. (Class 1 Obesity). The following options were offered after discussion;: referral to primary care        Physical Exam  HENT:      Head: Normocephalic.      Nose: Nose normal.      Mouth/Throat:      Mouth: Mucous membranes are moist.   Pulmonary:      Effort: Pulmonary effort is normal.   Skin:     General: Skin is warm and dry.   Neurological:      Mental Status: He is alert and oriented to person, place, and time.   Psychiatric:         Mood and Affect: Mood normal.         Behavior: Behavior normal.         Thought Content: Thought content normal.           PAP download reviewed: 7/18 to 8/16/2023.  30-day download.  I have reviewed and interpreted the data on the download.         Assessment and Plan     Diagnoses and all orders for this visit:    1. Central sleep apnea (Primary)  Assessment & Plan:  Baseline AHI is very severe at 50.  Long history of complex sleep.  He has been on ASV for many years.    He reports that his PAP device has been replaced through the [employer] recall.    He denies any excessive daytime sleepiness or sleepiness when driving.    Download is reviewed and noted AHI is elevated although his titration study on these pressures did show good improvement.  Noted that his AHI has been elevated on " downloads in the past.  This appears to be a normal download.  His compliance is excellent.    He is benefiting from ASV device and we plan to continue his ASV device.    Prescription to DME of his choice for supplies.    Plan follow-up in 1 year or sooner for any central sleep apnea or ASV concerns.    Orders:  -     PAP Therapy        Report if any new/changing symptoms immediately         Follow Up  Return in about 1 year (around 8/17/2024) for CSA.  Patient was given instructions and counseling regarding his condition or for health maintenance advice. Please see specific information pulled into the AVS if appropriate.

## 2023-10-08 PROCEDURE — 93296 REM INTERROG EVL PM/IDS: CPT | Performed by: STUDENT IN AN ORGANIZED HEALTH CARE EDUCATION/TRAINING PROGRAM

## 2023-10-08 PROCEDURE — 93294 REM INTERROG EVL PM/LDLS PM: CPT | Performed by: STUDENT IN AN ORGANIZED HEALTH CARE EDUCATION/TRAINING PROGRAM

## 2024-01-19 NOTE — PROGRESS NOTES
Subjective:     Encounter Date:01/26/2024      Patient ID: Micha Crockett is a 88 y.o.  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       PHYSICIAN: Anthony Fried MD  PREVIOUS CARDIOLOGIST: Gypsy Gibbs MD  ELECTROPHYSIOLOGIST: Luc Lange MD, [facility], [facility]  ORTHOPEDIST: Vitaly Omer MD  SLEEP MD:  Gypsy Gibbs MD.    Chief Complaint:   Chief Complaint   Patient presents with    Congestive Heart Failure     Problem List:  Remote paroxysmal atrial fibrillation with now chronic sustained atrial fibrillation and progressive sick sinus syndrome:  Diagnosed in [facility], in April 2014.   CHADS-VASc score of 4 with anticoagulation on Xarelto.  Successful cardioversion with restoration of sinus rhythm with advanced trifascicular block with MA interval of 292 msec in the setting of CRBB/LAHB, on 04/29/2014, by Dr. Slaughter.   Return of atrial fibrillation with Holter monitor.  Holter monitor for 48 hours with a minimum heart rate of 42 beats per minute and max of 138 beats per minute, with ventricular ectopy less than 1% of the time and supraventricular ectopy 2% of the time, 03/27/2015.  Echocardiogram showing estimated EF of 61% with mild-to-moderate LVH and left atrium slightly dilated, and right ventricle slightly dilated, and moderate aortic cuff sclerosis, and mild aortic regurgitation with mild mitral regurgitation, and mild-to-moderate tricuspid regurgitation.  Implantation of [employer] single-chamber permanent pacemaker on 07/13/2015 by Dr. Cano (can and leads are not MRI compatible), with acceptable interrogation and no reprogramming, April 2017, with subsequent acceptable remote check, August 2017, November 2018, with acceptable interrogation, January 2019, July 2019, July 2021.  Recent NYHA class II-III exertional dyspnea and fatigue without angina pectoris with abnormal echocardiogram (May 2019) and subsequent EP assessment  and discontinuation of Bystolic and stable abnormal echocardiogram (LVEF 0.39), September 2020  Residual class I symptoms, August 2019, April 2020, September 2020, July 2021, January 2022, July 2022  Echocardiogram 7/13/2022: LVEF 48%, LA volume moderately increased, RVSP less than 35 mmHg, mild dilation of the aortic root and of the sinuses of Valsalva present, the following left ventricular wall segments are hypokinetic: mid anterior, basal anterolateral, mid anterolateral, basal inferolateral, mid inferolateral, apical inferior, mid inferior, basal inferoseptal, mid inferoseptal, apex hypokinetic, mid anteroseptal, basal anterior, basal inferior and basal inferoseptal. The following left ventricular wall segments are akinetic: apical anterior, apical lateral and apical septal.  Normal RV cavity size, wall thickness, and systolic function noted.  Aortic valve exhibits mild to moderate sclerosis, and anterior apical left ventricular aneurysm is present.  Improved EF compared to September 2020 echocardiographic study report  Residual class I symptoms January 2023, July 2023, January 2024  Syncopal episodes secondary to bradycardia.   Hypertension.   Dyslipidemia; on statin therapy.   Diabetes mellitus type 2, hemoglobin  A1c 6.7%, July 2015; 6.6%, December 2018  Chronic right bundle branch block.   Chronic diastolic heart failure.  Overweight: BMI 28.96  Degenerative joint disease.   Ventral hernia.   Chronic kidney disease.   Obstructive sleep apnea with the use of CPAP.   Recent progressive dysphoria, weakness, exercise intolerance, and fatigue, off CPAP therapy, with recently completed polysomnogram and laboratory studies - data deficit, July 2019  Surgical history:  Appendectomy.   Pilonidal cyst.  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  Prostatectomy.   Pacemaker implantation    Allergies   Allergen Reactions    Bystolic [Nebivolol Hcl] Other (See Comments)     Fatigue, Couldn't sleep         Current Outpatient Medications   Medication Instructions    aspirin 81 mg, Oral, Daily    carvedilol (COREG) 6.25 MG tablet TAKE 1 TABLET BY MOUTH TWICE  DAILY    cetirizine (ZYRTEC) 10 mg, Oral, As Needed    docusate sodium (COLACE) 100 mg, Oral, 2 Times Daily    fenofibrate 160 mg, Oral, Daily    metFORMIN ER (GLUCOPHAGE-XR) 500 mg, Oral, 2 times daily    pravastatin (PRAVACHOL) 40 mg, Oral, Every Night at Bedtime    sacubitril-valsartan (Entresto) 24-26 MG tablet 1 tablet, Oral, 2 Times Daily    Xarelto 15 MG tablet TAKE 1 TABLET BY MOUTH DAILY  WITH DINNER         HISTORY OF PRESENT ILLNESS:  The patient is here for 6-month follow-up. Echocardiogram 7/13/2022 showed LVEF 48%, LA volume moderately increased, RVSP less than 35 mmHg, mild dilation of the aortic root and of the sinuses of Valsalva present, aortic valve exhibits mild to moderate sclerosis, and anterior apical left ventricular aneurysm is present.  EF had improved compared to previous echocardiogram.  He denies any chest pain, shortness of breath, palpitations, dizziness, presyncope, syncope, or edema.  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  He has not had any recent laboratory testing but he is going to schedule an appointment with his primary care physician soon.  The patient says that since he has been on Entresto he feels better.      ROS   All other systems reviewed and otherwise negative.      ECG 12 Lead    Date/Time: 1/26/2024 9:56 AM  Performed by: Kathleen Mccray APRN    Authorized by: Kathleen Mccray APRN  Rhythm comments: Ventricular paced rhythm, abnormal ECG, 70 bpm,  ms, QTc 509 ms, no significant changes from last ECG January 2023             Objective:       Vitals:    01/26/24 0922 01/26/24 0929   BP: 130/68 130/64   BP Location: Right arm Right arm   Patient Position: Sitting Standing  "  Cuff Size: Adult Adult   SpO2: 95% 96%   Weight: 91.5 kg (201 lb 12.8 oz)    Height: 177.8 cm (70\")      Body mass index is 28.96 kg/m².  Last weight July 2023 was 207 pounds  Constitutional:       Appearance: Healthy appearance. Not in distress.   Neck:      Vascular: No JVR. JVD normal.   Pulmonary:      Effort: Pulmonary effort is normal.      Breath sounds: Normal breath sounds. No wheezing. No rhonchi. No rales.   Chest:      Chest wall: Not tender to palpatation.   Cardiovascular:      PMI at left midclavicular line. Normal rate. Regular rhythm. Normal S1. Normal S2.       Murmurs: There is a grade 1/6 systolic murmur at the LLSB.      No gallop.  No click. No rub.   Pulses:     Intact distal pulses.   Edema:     Peripheral edema absent.   Abdominal:      General: Bowel sounds are normal.      Palpations: Abdomen is soft.      Tenderness: There is no abdominal tenderness.   Musculoskeletal: Normal range of motion.         General: No tenderness. Skin:     General: Skin is warm and dry.   Neurological:      General: No focal deficit present.      Mental Status: Alert and oriented to person, place and time.           Lab Review:   Lab Results   Component Value Date    GLUCOSE 107 (H) 07/15/2015    BUN 28 (H) 07/15/2015    CREATININE 1.4 (H) 07/15/2015    CO2 29 07/15/2015    CALCIUM 8.8 07/15/2015    ALBUMIN 4.0 07/12/2015    AST 17 07/12/2015    ALT 16 07/12/2015       Lab Results   Component Value Date    WBC 10.47 07/14/2015    HGB 12.0 (L) 07/14/2015    HCT 37.3 (L) 07/14/2015    MCV 86.9 07/14/2015     07/14/2015       Lab Results   Component Value Date    HGBA1C 6.7 (H) 07/12/2015       Lab Results   Component Value Date    TSH 3.548 07/12/2015         Lab Results   Component Value Date     (H) 07/12/2015     Advance Care Planning   ACP discussion was held with the patient during this visit. Patient has an advance directive (not in EMR), copy requested.        BSC single-chamber PPM " interrogation 1/26/2024: 100% RV pacing, patient is pacemaker dependent, 3 years battery longevity, 11 episodes of VHR with the longest 10 beats, reprogramming for MV accelerometer was increased 11        Assessment:     Overall continued acceptable course with no new interim cardiopulmonary complaints with acceptable functional status. We will defer additional diagnostic or therapeutic intervention from a cardiac perspective at this time.  Echocardiogram 7/13/2022 showed LVEF 48%, LA volume moderately increased, RVSP less than 35 mmHg, mild dilation of the aortic root and of the sinuses of Valsalva present, aortic valve exhibits mild to moderate sclerosis, and anterior apical left ventricular aneurysm is present.  EF had improved compared to previous echocardiogram.  Would continue GDMT.  Hopefully I will be to review the patient's next laboratory testing results.  I will plan to get a repeat echocardiogram spring 2025 to reassess EF and aortic root dilation.  The patient had an acceptable device interrogation in office today and his MV accelerometer was increased 11.     Diagnosis Plan   1. Chronic systolic congestive heart failure  No recurrent angina pectoris or CHF on current activity schedule; continue current treatment       2. Primary hypertension  Controlled, continue current cardiac medications      3. Dyslipidemia  No new labs review, continue pravastatin             Plan:         Patient to continue current medications and close follow up with the above providers.  Tentative cardiology follow up in July 2024 or patient may return sooner PRN.  BSC PPM check next visit      Electronically signed by UGO Zaragoza, 01/26/24, 9:59 AM EST.

## 2024-01-26 ENCOUNTER — OFFICE VISIT (OUTPATIENT)
Dept: CARDIOLOGY | Facility: CLINIC | Age: 89
End: 2024-01-26
Payer: MEDICARE

## 2024-01-26 VITALS
SYSTOLIC BLOOD PRESSURE: 130 MMHG | OXYGEN SATURATION: 96 % | DIASTOLIC BLOOD PRESSURE: 64 MMHG | HEIGHT: 70 IN | WEIGHT: 201.8 LBS | BODY MASS INDEX: 28.89 KG/M2

## 2024-01-26 DIAGNOSIS — I10 PRIMARY HYPERTENSION: ICD-10-CM

## 2024-01-26 DIAGNOSIS — I50.22 CHRONIC SYSTOLIC CONGESTIVE HEART FAILURE: Primary | ICD-10-CM

## 2024-01-26 DIAGNOSIS — E78.5 DYSLIPIDEMIA: ICD-10-CM

## 2024-02-19 RX ORDER — RIVAROXABAN 15 MG/1
TABLET, FILM COATED ORAL
Qty: 90 TABLET | Refills: 3 | Status: SHIPPED | OUTPATIENT
Start: 2024-02-19

## 2024-03-13 ENCOUNTER — TELEPHONE (OUTPATIENT)
Dept: CARDIOLOGY | Facility: CLINIC | Age: 89
End: 2024-03-13
Payer: MEDICARE

## 2024-03-13 NOTE — TELEPHONE ENCOUNTER
Pts wife called needing samples of Entresto 24/46.  [employer] is is in the process of sending pt the medicine but he only had 2 dosages left.  I put some samples up front for the pt to .    Pt was agreeable and verbalized understanding

## 2024-03-27 RX ORDER — CARVEDILOL 6.25 MG/1
TABLET ORAL
Qty: 180 TABLET | Refills: 3 | Status: SHIPPED | OUTPATIENT
Start: 2024-03-27

## 2024-06-24 ENCOUNTER — TELEPHONE (OUTPATIENT)
Dept: CARDIOLOGY | Facility: CLINIC | Age: 89
End: 2024-06-24

## 2024-06-24 NOTE — TELEPHONE ENCOUNTER
"    Caller: Micha Granger \"Sunil\"    Relationship to patient: Self    Best call back number: [phone]    Chief complaint: PATIENT SON CALLED IN TODAY TO RESCHEDULE HIS PARENTS APPOINTMENTS FOR THE SAME DAY. HE IS NOT ON HIS FATHERS BH VERBAL. HE STATED THAT HIS PARENTS WERE BECOMING FORGETFUL AND HE WAS GOING TO BE TAKING OVER THEIR CARE. HE SAID HE WOULD SPEAK TO THE OFFICE AT THEIR NEXT APPOINTMENT ABOUT HIM BEING FIRST CONTACT. MR. GRANGER IS SCHEDULED FOR 07.26.24 FOR A DEVICE CHECK. PLEASE REACH OUT TO PATIENT TO GET THEM SCHEDULED TOGETHER. ALSO SENDING A TE THROUGH MRS. [facility] CHART.     Type of visit: DEVICE CHECK    Requested date: ASAP WITH HIS WIFE     If rescheduling, when is the original appointment: 07.26.24            "

## 2024-07-09 NOTE — PROGRESS NOTES
Subjective:     Encounter Date:07/16/2024      Patient ID: Micha Crockett is a 89 y.o.   white male, a / retired farmer, from [place], [place].       PHYSICIAN: Anthony Fried MD  PREVIOUS CARDIOLOGIST: Gypsy Gibbs MD  ELECTROPHYSIOLOGIST: Luc Lange MD, [facility], [facility]  ORTHOPEDIST: Vitaly Omer MD  SLEEP MD:  Gypsy Gibbs MD.    Chief Complaint:   Chief Complaint   Patient presents with    Chronic systolic congestive heart failure     Problem List:  Remote paroxysmal atrial fibrillation with now chronic sustained atrial fibrillation and progressive sick sinus syndrome:  Diagnosed in [facility], in April 2014.   CHADS-VASc score of 4 with anticoagulation on Xarelto.  Successful cardioversion with restoration of sinus rhythm with advanced trifascicular block with HI interval of 292 msec in the setting of CRBB/LAHB, on 04/29/2014, by Dr. Slaughter.   Return of atrial fibrillation with Holter monitor.  Holter monitor for 48 hours with a minimum heart rate of 42 beats per minute and max of 138 beats per minute, with ventricular ectopy less than 1% of the time and supraventricular ectopy 2% of the time, 03/27/2015.  Echocardiogram showing estimated EF of 61% with mild-to-moderate LVH and left atrium slightly dilated, and right ventricle slightly dilated, and moderate aortic cuff sclerosis, and mild aortic regurgitation with mild mitral regurgitation, and mild-to-moderate tricuspid regurgitation.  Implantation of [employer] single-chamber permanent pacemaker on 07/13/2015 by Dr. Cano (can and leads are not MRI compatible), with acceptable interrogation and no reprogramming, April 2017, with subsequent acceptable remote check, August 2017, November 2018, with acceptable interrogation, January 2019, July 2019, July 2021.  Recent NYHA class II-III exertional dyspnea and fatigue without angina pectoris with abnormal echocardiogram (May 2019) and  subsequent EP assessment and discontinuation of Bystolic and stable abnormal echocardiogram (LVEF 0.39), September 2020  Residual class I symptoms, August 2019, April 2020, September 2020, July 2021, January 2022, July 2022  Echocardiogram 7/13/2022: LVEF 48%, LA volume moderately increased, RVSP less than 35 mmHg, mild dilation of the aortic root and of the sinuses of Valsalva present, the following left ventricular wall segments are hypokinetic: mid anterior, basal anterolateral, mid anterolateral, basal inferolateral, mid inferolateral, apical inferior, mid inferior, basal inferoseptal, mid inferoseptal, apex hypokinetic, mid anteroseptal, basal anterior, basal inferior and basal inferoseptal. The following left ventricular wall segments are akinetic: apical anterior, apical lateral and apical septal.  Normal RV cavity size, wall thickness, and systolic function noted.  Aortic valve exhibits mild to moderate sclerosis, and anterior apical left ventricular aneurysm is present.  Improved EF compared to September 2020 echocardiographic study report  Residual class I symptoms January 2023, July 2023, January 2024, July 2024  Syncopal episodes secondary to bradycardia.   Hypertension.   Dyslipidemia; on statin therapy.   Diabetes mellitus type 2, hemoglobin  A1c 6.7%, July 2015; 6.6%, December 2018  Chronic right bundle branch block.   Chronic diastolic heart failure.  Overweight: BMI 28.96  Degenerative joint disease.   Ventral hernia.   Chronic kidney disease.   Obstructive sleep apnea with the use of CPAP.   Recent progressive dysphoria, weakness, exercise intolerance, and fatigue, off CPAP therapy, with recently completed polysomnogram and laboratory studies - data deficit, July 2019  Surgical history:  Appendectomy.   Pilonidal cyst.  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  Prostatectomy.   Pacemaker implantation    Allergies   Allergen Reactions    Bystolic [Nebivolol Hcl] Other (See Comments)      "Fatigue, Couldn't sleep        Current Outpatient Medications   Medication Instructions    aspirin 81 mg, Oral, Daily    carvedilol (COREG) 6.25 MG tablet TAKE 1 TABLET BY MOUTH TWICE  DAILY    cetirizine (ZYRTEC) 10 mg, Oral, As Needed    docusate sodium (COLACE) 100 mg, Oral, 2 Times Daily    fenofibrate 160 mg, Oral, Daily    metFORMIN ER (GLUCOPHAGE-XR) 500 mg, Oral, 2 times daily    pravastatin (PRAVACHOL) 40 mg, Oral, Every Night at Bedtime    sacubitril-valsartan (Entresto) 24-26 MG tablet 1 tablet, Oral, 2 Times Daily    Xarelto 15 MG tablet TAKE 1 TABLET BY MOUTH DAILY  WITH DINNER         HISTORY OF PRESENT ILLNESS:  The patient is here for a 6-month follow-up.  The patient goes to the [facility] to do some walking.  He denies any chest pain, shortness of breath, palpitations, dizziness, presyncope, or syncope.  He is fasting for labs today.  His blood pressures at home have been WNL.  The patient is compliant with CPAP nightly.  He has not had any hospitalizations, surgeries, or new diagnoses since he was last seen.      ROS   All other systems reviewed and otherwise negative.    Procedures       Objective:       Vitals:    07/16/24 0943 07/16/24 0946   BP: 112/70 114/80   Pulse: 73 71   SpO2: 98% 98%   Weight: 92.7 kg (204 lb 6.4 oz)    Height: 175.3 cm (69\")      Body mass index is 30.18 kg/m².  Wt Readings from Last 2 Encounters:   07/16/24 92.7 kg (204 lb 6.4 oz)   01/26/24 91.5 kg (201 lb 12.8 oz)        Constitutional:       Appearance: Healthy appearance. Not in distress.   Neck:      Vascular: No JVR. JVD normal.   Pulmonary:      Effort: Pulmonary effort is normal.      Breath sounds: Normal breath sounds. No wheezing. No rhonchi. No rales.   Chest:      Chest wall: Not tender to palpatation.   Cardiovascular:      PMI at left midclavicular line. Normal rate. Regular rhythm. Normal S1. Normal S2.       Murmurs: There is a grade 1/6 systolic murmur at the LLSB.      No gallop.  No click. No rub. "   Pulses:     Intact distal pulses.   Edema:     Peripheral edema absent.   Abdominal:      General: Bowel sounds are normal.      Palpations: Abdomen is soft.      Tenderness: There is no abdominal tenderness.   Musculoskeletal: Normal range of motion.         General: No tenderness. Skin:     General: Skin is warm and dry.   Neurological:      General: No focal deficit present.      Mental Status: Alert and oriented to person, place and time.           Lab Review:   Lab Results   Component Value Date    GLUCOSE 107 (H) 07/15/2015    BUN 28 (H) 07/15/2015    CREATININE 1.4 (H) 07/15/2015    CO2 29 07/15/2015    CALCIUM 8.8 07/15/2015    ALBUMIN 4.0 07/12/2015    AST 17 07/12/2015    ALT 16 07/12/2015       Lab Results   Component Value Date    WBC 10.47 07/14/2015    HGB 12.0 (L) 07/14/2015    HCT 37.3 (L) 07/14/2015    MCV 86.9 07/14/2015     07/14/2015       Lab Results   Component Value Date    HGBA1C 6.7 (H) 07/12/2015       Lab Results   Component Value Date    TSH 3.548 07/12/2015         Lab Results   Component Value Date     (H) 07/12/2015                 Results for orders placed during the hospital encounter of 07/13/22    Adult Transthoracic Echo Complete w/ Color, Spectral and Contrast if necessary per protocol    Interpretation Summary  · Estimated left ventricular EF = 48% Estimated left ventricular EF was in agreement with the calculated left ventricular EF. Left ventricular ejection fraction appears to be 46 - 50%. Left ventricular systolic function is low normal.  · Left atrial volume is moderately increased.  · Estimated right ventricular systolic pressure from tricuspid regurgitation is normal (<35 mmHg).  · Mild dilation of the aortic root and of the sinuses of Valsalva present.  · The following left ventricular wall segments are hypokinetic: mid anterior, basal anterolateral, mid anterolateral, basal inferolateral, mid inferolateral, apical inferior, mid inferior, basal  inferoseptal, mid inferoseptal, apex hypokinetic, mid anteroseptal, basal anterior, basal inferior and basal inferoseptal. The following left ventricular wall segments are akinetic: apical anterior, apical lateral and apical septal.  · Normal right ventricular cavity size, wall thickness and systolic function noted.  · The aortic valve exhibits mild-moderate sclerosis.  · No evidence of pulmonary hypertension is present.  · There is no evidence of pericardial effusion.  · An anterior apical left ventricular aneurysm is present.  · Improved systolic left ventricular function noted since 30 September 2020 echocardiographic study report (LVEF 0.39).     [facility] single-chamber pacemaker interrogation 7/16/2024: 99% RV pacing, underlying rhythm CHB, 2.5 years battery longevity       Advance Care Planning   ACP discussion was held with the patient during this visit. Patient has an advance directive (not in EMR), copy requested.      Assessment:     Overall continued acceptable course with no new interim cardiopulmonary complaints with acceptable functional status. We will defer additional diagnostic or therapeutic intervention from a cardiac perspective at this time other than an echocardiogram same day as next appointment to reassess EF and aortic root dilation.  Pacemaker interrogation acceptable in office today.     Diagnosis Plan   1. Chronic systolic congestive heart failure  No recurrent angina pectoris or CHF on current activity schedule; continue current treatment, echocardiogram same day as next appointment      2. Primary hypertension  Controlled, continue current cardiac medications      3. Dyslipidemia  No new labs to review, continue pravastatin      4. Permanent atrial fibrillation  Continue Coreg, Xarelto, Pacemaker interrogation acceptable in office today.             Plan:         Patient to continue current medications and close follow up with the above providers.  Tentative cardiology follow up in January  2025 or patient may return sooner PRN.  Echocardiogram same day as next appointment to reassess EF and aortic root dilation  BSC PPM check  Labs    Electronically signed by UGO Zaragoza, 07/16/24, 10:22 AM EDT.

## 2024-07-16 ENCOUNTER — OFFICE VISIT (OUTPATIENT)
Dept: CARDIOLOGY | Facility: CLINIC | Age: 89
End: 2024-07-16
Payer: MEDICARE

## 2024-07-16 ENCOUNTER — LAB (OUTPATIENT)
Dept: LAB | Facility: HOSPITAL | Age: 89
End: 2024-07-16
Payer: MEDICARE

## 2024-07-16 ENCOUNTER — DOCUMENTATION (OUTPATIENT)
Dept: CARDIOLOGY | Facility: CLINIC | Age: 89
End: 2024-07-16

## 2024-07-16 VITALS
BODY MASS INDEX: 30.27 KG/M2 | WEIGHT: 204.4 LBS | HEART RATE: 71 BPM | DIASTOLIC BLOOD PRESSURE: 80 MMHG | OXYGEN SATURATION: 98 % | HEIGHT: 69 IN | SYSTOLIC BLOOD PRESSURE: 114 MMHG

## 2024-07-16 DIAGNOSIS — I48.21 PERMANENT ATRIAL FIBRILLATION: ICD-10-CM

## 2024-07-16 DIAGNOSIS — I50.22 CHRONIC SYSTOLIC CONGESTIVE HEART FAILURE: ICD-10-CM

## 2024-07-16 DIAGNOSIS — E78.5 DYSLIPIDEMIA: ICD-10-CM

## 2024-07-16 DIAGNOSIS — E11.9 TYPE 2 DIABETES MELLITUS WITHOUT COMPLICATION, UNSPECIFIED WHETHER LONG TERM INSULIN USE: ICD-10-CM

## 2024-07-16 DIAGNOSIS — I10 PRIMARY HYPERTENSION: ICD-10-CM

## 2024-07-16 DIAGNOSIS — I50.22 CHRONIC SYSTOLIC CONGESTIVE HEART FAILURE: Primary | ICD-10-CM

## 2024-07-16 LAB
ALBUMIN SERPL-MCNC: 3.9 G/DL (ref 3.5–5.2)
ALBUMIN/GLOB SERPL: 1.4 G/DL
ALP SERPL-CCNC: 56 U/L (ref 39–117)
ALT SERPL W P-5'-P-CCNC: 12 U/L (ref 1–41)
ANION GAP SERPL CALCULATED.3IONS-SCNC: 8.9 MMOL/L (ref 5–15)
AST SERPL-CCNC: 15 U/L (ref 1–40)
BILIRUB SERPL-MCNC: 0.4 MG/DL (ref 0–1.2)
BUN SERPL-MCNC: 16 MG/DL (ref 8–23)
BUN/CREAT SERPL: 12.6 (ref 7–25)
CALCIUM SPEC-SCNC: 9.3 MG/DL (ref 8.6–10.5)
CHLORIDE SERPL-SCNC: 109 MMOL/L (ref 98–107)
CHOLEST SERPL-MCNC: 121 MG/DL (ref 0–200)
CO2 SERPL-SCNC: 24.1 MMOL/L (ref 22–29)
CREAT SERPL-MCNC: 1.27 MG/DL (ref 0.76–1.27)
DEPRECATED RDW RBC AUTO: 44.4 FL (ref 37–54)
EGFRCR SERPLBLD CKD-EPI 2021: 54.7 ML/MIN/1.73
ERYTHROCYTE [DISTWIDTH] IN BLOOD BY AUTOMATED COUNT: 14.2 % (ref 12.3–15.4)
GLOBULIN UR ELPH-MCNC: 2.7 GM/DL
GLUCOSE SERPL-MCNC: 129 MG/DL (ref 65–99)
HBA1C MFR BLD: 7.2 % (ref 4.8–5.6)
HCT VFR BLD AUTO: 43.9 % (ref 37.5–51)
HDLC SERPL-MCNC: 44 MG/DL (ref 40–60)
HGB BLD-MCNC: 14.3 G/DL (ref 13–17.7)
LDLC SERPL CALC-MCNC: 62 MG/DL (ref 0–100)
LDLC/HDLC SERPL: 1.41 {RATIO}
MAGNESIUM SERPL-MCNC: 2.2 MG/DL (ref 1.6–2.4)
MCH RBC QN AUTO: 28.2 PG (ref 26.6–33)
MCHC RBC AUTO-ENTMCNC: 32.6 G/DL (ref 31.5–35.7)
MCV RBC AUTO: 86.6 FL (ref 79–97)
PLATELET # BLD AUTO: 255 10*3/MM3 (ref 140–450)
PMV BLD AUTO: 10.4 FL (ref 6–12)
POTASSIUM SERPL-SCNC: 5.4 MMOL/L (ref 3.5–5.2)
PROT SERPL-MCNC: 6.6 G/DL (ref 6–8.5)
RBC # BLD AUTO: 5.07 10*6/MM3 (ref 4.14–5.8)
SODIUM SERPL-SCNC: 142 MMOL/L (ref 136–145)
TRIGL SERPL-MCNC: 75 MG/DL (ref 0–150)
TSH SERPL DL<=0.05 MIU/L-ACNC: 2.16 UIU/ML (ref 0.27–4.2)
VLDLC SERPL-MCNC: 15 MG/DL (ref 5–40)
WBC NRBC COR # BLD AUTO: 6.85 10*3/MM3 (ref 3.4–10.8)

## 2024-07-16 PROCEDURE — 83036 HEMOGLOBIN GLYCOSYLATED A1C: CPT

## 2024-07-16 PROCEDURE — 80061 LIPID PANEL: CPT

## 2024-07-16 PROCEDURE — 36415 COLL VENOUS BLD VENIPUNCTURE: CPT

## 2024-07-16 PROCEDURE — 84443 ASSAY THYROID STIM HORMONE: CPT

## 2024-07-16 PROCEDURE — 83735 ASSAY OF MAGNESIUM: CPT

## 2024-07-16 PROCEDURE — 80053 COMPREHEN METABOLIC PANEL: CPT

## 2024-07-16 PROCEDURE — 85027 COMPLETE CBC AUTOMATED: CPT

## 2024-07-16 RX ORDER — TORSEMIDE 5 MG/1
5 TABLET ORAL DAILY
Qty: 2 TABLET | Refills: 0 | Status: SHIPPED | OUTPATIENT
Start: 2024-07-16 | End: 2024-07-18

## 2024-07-16 NOTE — PROGRESS NOTES
I was able to review the patient's laboratory testing results.  Blood counts and thyroid function were normal.  Hemoglobin A1c was 7.2%.  I would recommend for the patient to adhere to a heart healthy diet,  remain physically active, and follow-up with his PCP regarding this.  Kidney function, liver function and electrolytes were acceptable except glucose was elevated at 129.  Also the patient's potassium was elevated at 5.4.  Cholesterol levels were perfect.  I would recommend that the patient get his potassium rechecked in 1-1.5 weeks to see if potassium is still elevated.  I am going to send in 2 doses of torsemide in the interim.  I will have SHERMAN Sexton call the patient back with results/recommendations.     Electronically signed by UGO Zaragoza, 07/16/24, 4:22 PM EDT.

## 2024-07-17 ENCOUNTER — TELEPHONE (OUTPATIENT)
Dept: CARDIOLOGY | Facility: CLINIC | Age: 89
End: 2024-07-17
Payer: MEDICARE

## 2024-07-17 DIAGNOSIS — I50.22 CHRONIC SYSTOLIC CONGESTIVE HEART FAILURE: ICD-10-CM

## 2024-07-17 DIAGNOSIS — I10 PRIMARY HYPERTENSION: Primary | ICD-10-CM

## 2024-07-17 NOTE — TELEPHONE ENCOUNTER
Spoke with pts wife about results of recent lab work per UGO Burleson    I was able to review the patient's laboratory testing results.  Blood counts and thyroid function were normal.  Hemoglobin A1c was 7.2%.  I would recommend for the patient to adhere to a heart healthy diet,  remain physically active, and follow-up with his PCP regarding this.  Kidney function, liver function and electrolytes were acceptable except glucose was elevated at 129.  Also the patient's potassium was elevated at 5.4.  Cholesterol levels were perfect.  I would recommend that the patient get his potassium rechecked in 1-1.5 weeks to see if potassium is still elevated.  I am going to send in 2 doses of torsemide in the interim.     PT was agreeable to plan and verbalized understanding

## 2024-08-13 ENCOUNTER — TELEPHONE (OUTPATIENT)
Dept: CARDIOLOGY | Facility: CLINIC | Age: 89
End: 2024-08-13
Payer: MEDICARE

## 2024-08-13 NOTE — TELEPHONE ENCOUNTER
Caller: YOMAIRA Crockett    Relationship: Emergency Contact    Best call back number: [phone]     Which medication are you concerned about: DOXYLAMINE SUCCIMATE    Who prescribed you this medication: OVER THE COUNTER    When did you start taking this medication: HAS NOT STARTED TAKING THIS.    What are your concerns: SON IS WANTING TO MAKE SURE IT IS OKAY FOR PT TO TAKE SLEEP AID WITH HIS MEDICATIONS HE IS CURRENTLY PRESCRIBED. PLEASE CALL PT'S SON.

## 2024-08-14 ENCOUNTER — TELEPHONE (OUTPATIENT)
Dept: CARDIOLOGY | Facility: CLINIC | Age: 89
End: 2024-08-14

## 2024-08-14 NOTE — TELEPHONE ENCOUNTER
Called and spoke with Pts wife with recommendations per UGO Burleson in regards to using a sleep aid      PT is ok to use sleep aid as long as he continues to monitor his heart rate    Pts wife was agreeable and verbalized understanding

## 2024-08-14 NOTE — TELEPHONE ENCOUNTER
Caller: YOMAIRA Granger    Relationship: Emergency Contact    Best call back number: [phone]    What is the best time to reach you: ANYTIME     Who are you requesting to speak with (clinical staff, provider,  specific staff member): ANYONE      What was the call regarding: PT'S SON IS WANTING TO KNOW IF IT IS OKAY FOR FATHER TO TAKE SLEEP AID. MEDICATION IS DOXYLAMINE SUCCIMATE. SON IS WANTING SOME CLARIFICATION. [facility]WIN CONTACTED PT'S WIFE YESTERDAY, BUT SON WANTS TO SPEAK WITH SOMEONE FROM [employer].     Is it okay if the provider responds through [employer]:  PLEASE CALL IKER GRANGER.

## 2024-08-15 ENCOUNTER — OFFICE VISIT (OUTPATIENT)
Dept: CARDIOLOGY | Facility: CLINIC | Age: 89
End: 2024-08-15
Payer: MEDICARE

## 2024-08-15 VITALS
WEIGHT: 204 LBS | HEART RATE: 84 BPM | OXYGEN SATURATION: 97 % | SYSTOLIC BLOOD PRESSURE: 126 MMHG | BODY MASS INDEX: 30.21 KG/M2 | HEIGHT: 69 IN | DIASTOLIC BLOOD PRESSURE: 66 MMHG

## 2024-08-15 DIAGNOSIS — G47.31 CENTRAL SLEEP APNEA: Primary | ICD-10-CM

## 2024-08-15 PROCEDURE — 1160F RVW MEDS BY RX/DR IN RCRD: CPT | Performed by: NURSE PRACTITIONER

## 2024-08-15 PROCEDURE — 1159F MED LIST DOCD IN RCRD: CPT | Performed by: NURSE PRACTITIONER

## 2024-08-15 PROCEDURE — 99213 OFFICE O/P EST LOW 20 MIN: CPT | Performed by: NURSE PRACTITIONER

## 2024-08-15 NOTE — ASSESSMENT & PLAN NOTE
Baseline AHI is very severe at 50.  Long history of complex sleep.    He had been on ASV for many years in the past.  Due to having coexisting CHF he was moved from ASV to BiPAP ST several years ago.    He is BiPAP ST was replaced through the [employer] recall.    He denies any excessive daytime sleepiness or sleeping when driving.    He is wife is present for his appointment today and she reports that he sleeps very well.  He denies that he snores.    He is on BiPAP ST.  Download is reviewed and noted that his AHI is elevated but improved from baseline.  Compliance is excellent.  Noted that his AHI has been elevated on download in the past and this is similar.    He is benefiting from BiPAP ST.  We plan to continue BiPAP ST.    Prescription to DME of patient's choice for his PAP supplies.    Plan follow-up in 1 year or sooner for any sleep apnea concerns.

## 2024-08-15 NOTE — PROGRESS NOTES
Follow-Up Sleep Consult     Date:   08/15/2024  Name: Micha Crockett  :   1935  PCP: Gamaliel Forte MD    Chief Complaint   Patient presents with    CENTRAL SLEEP APNEA       Subjective     History of Present Illness  Micha Crockett is a 89 y.o. male who presents today for follow-up on CSA.  He comes in today for his annual scheduled follow-up visit.  He is accompanied by his very helpful wife.    We discussed his sleep history.  He reports that he was on a different device in the distant past and due to heart concerns he was moved a different type of device.  He reports that he had a recalled device and that device was replaced around 2- 3 years ago.    Sleep history:  CANDY AHI 50 with complex sleep on 3/6/2014    Titration study 2014 to ASV max 25, MN 6 and max 15, PS 6-15, auto breath rate    Periodic leg movements of sleep moderate    Current CSA treatment: Bipap ST 17/13 BR 13    Coexisting conditions: A-fib/PM/CHF       Current mask used is nasal cushion    Device Functioning Well: Yes  Mask Fit Comfortable: Yes  Air Flow Comfortable: Yes  DME Helpful for Supplies: Yes  Sleep is rested: Yes        Device Download:                         The patient's relevant past medical, surgical, family, and social history reviewed and updated in Epic as appropriate.    Past Medical History:   Diagnosis Date    Atrial fibrillation 10/11/2016    Recently diagnosed in [facility], in 2014.  CHADS-VASc score of 4 with anticoagulation on Xarelto. Successful cardioversion with restoration of sinus rhythm with advanced trifascicular block with OH interval of 292 msec in the setting of CRBB/LAHB, on 2014, by Dr. Slaughter.  Return of atrial fibrillation with Holter monitor. Holter monitor for 48 hours with a minimum heart rate of 42 beats per minute and max of 138 beats per minute, with ventricular ectopy less than 1% of the time and supraventricular ectopy 2% of  the time, 03/27/2015. Echocardiogram showing estimated EF of 61% with mild-to-moderate LVH and left atrium slightly dilated, and right ventricle slightly dilated, and moderate aortic cuff sclerosis, and mild aortic regurgitation with mild mitral regurgitation, and mild-to-moderate tricuspid regurgitation. Implantation of  a [employer] single-chamber permanent pacemaker on 07/13/2015 by Dr. Cano.    CKD (chronic kidney disease) 10/11/2016    Diabetes mellitus, type 2 10/11/2016    DJD (degenerative joint disease) 10/11/2016    Dyslipidemia 10/11/2016    Fall 30 nov 2020    Hypertension 10/11/2016    CANDY on CPAP 10/11/2016    baseline AHI 21    Right bundle branch block 10/11/2016    Syncopal episodes 10/11/2016    episodes secondary to bradycardia.     Ventral hernia 10/11/2016     Past Surgical History:   Procedure Laterality Date    AMPUTATION      Traumatic amputation of the finger with reattachment.    APPENDECTOMY      OTHER SURGICAL HISTORY      Pilonidal cyst    PROSTATECTOMY         Allergies   Allergen Reactions    Bystolic [Nebivolol Hcl] Other (See Comments)     Fatigue, Couldn't sleep      Prior to Admission medications    Medication Sig Start Date End Date Taking? Authorizing Provider   aspirin 81 MG tablet Take 1 tablet by mouth Daily.   Yes Alize Humphrey MD   carvedilol (COREG) 6.25 MG tablet TAKE 1 TABLET BY MOUTH TWICE  DAILY 3/27/24  Yes Kathleen Mccray APRN   cetirizine (zyrTEC) 10 MG tablet Take 1 tablet by mouth As Needed for Allergies.   Yes Alize Humphrey MD   docusate sodium (COLACE) 100 MG capsule Take 1 capsule by mouth 2 (Two) Times a Day.   Yes Alize Humphrey MD   fenofibrate 160 MG tablet Take 1 tablet by mouth Daily. 8/28/16  Yes Alize Humphrey MD   metFORMIN XR (GLUCOPHAGE-XR) 500 MG 24 hr tablet Take 1 tablet by mouth 2 (two) times a day. 9/8/16  Yes Alize Humphrey MD   pravastatin (PRAVACHOL) 40 MG tablet Take 1 tablet by mouth every  "night at bedtime. 5/3/21  Yes Provider, Historical, MD   sacubitril-valsartan (Entresto) 24-26 MG tablet Take 1 tablet by mouth 2 (Two) Times a Day. 1/25/23  Yes Kathleen Mccray APRN   Xarelto 15 MG tablet TAKE 1 TABLET BY MOUTH DAILY  WITH DINNER 2/19/24  Yes Kathleen Mccray APRN   torsemide (DEMADEX) 5 MG tablet Take 1 tablet by mouth Daily for 2 doses. 7/16/24 7/18/24  Kathleen Mccray APRN     Family History   Problem Relation Age of Onset    Stroke Mother     No Known Problems Father        Objective     Vital Signs:  /66   Pulse 84   Ht 175.3 cm (69\")   Wt 92.5 kg (204 lb)   SpO2 97%   BMI 30.13 kg/m²              Physical Exam  HENT:      Head: Normocephalic.      Nose: Nose normal.      Mouth/Throat:      Mouth: Mucous membranes are moist.   Pulmonary:      Effort: Pulmonary effort is normal.   Skin:     General: Skin is warm and dry.   Neurological:      Mental Status: He is alert and oriented to person, place, and time.   Psychiatric:         Mood and Affect: Mood normal.         Behavior: Behavior normal.         Thought Content: Thought content normal.         The following data was reviewed by: UGO Craig on 08/15/2024:    PAP download reviewed: 30-day download as above.  I have reviewed and interpreted the data on the download at today's visit.         Assessment and Plan     Diagnoses and all orders for this visit:    1. Central sleep apnea (Primary)  Assessment & Plan:  Baseline AHI is very severe at 50.  Long history of complex sleep.    He had been on ASV for many years in the past.  Due to having coexisting CHF he was moved from ASV to [employer] ST several years ago.    He is BiPAP ST was replaced through the [employer].    He denies any excessive daytime sleepiness or sleeping when driving.    He is wife is present for his appointment today and she reports that he sleeps very well.  He denies that he snores.    He is on BiPAP ST.  Download is reviewed and noted that " his AHI is elevated but improved from baseline.  Compliance is excellent.  Noted that his AHI has been elevated on download in the past and this is similar.    He is benefiting from BiPAP ST.  We plan to continue BiPAP ST.    Prescription to DME of patient's choice for his PAP supplies.    Plan follow-up in 1 year or sooner for any sleep apnea concerns.        Orders:  -     PAP Therapy        Report if any new/changing symptoms immediately         Follow Up  Return in about 1 year (around 8/15/2025) for CSA.  Patient was given instructions and counseling regarding his condition or for health maintenance advice. Please see specific information pulled into the AVS if appropriate.

## 2025-01-07 NOTE — PROGRESS NOTES
Subjective:     Encounter Date:01/17/2025      Patient ID: Micha Crockett is a 89 y.o.   white male, a / retired farmer, from [place], [place].       PHYSICIAN: Anthony Fried MD  PREVIOUS CARDIOLOGIST: Gypsy Gibbs MD  ELECTROPHYSIOLOGIST: Luc Lange MD, [facility], [facility]  ORTHOPEDIST: Vitaly Omer MD  SLEEP MD:  Gypsy Gibbs MD.    Chief Complaint:   Chief Complaint   Patient presents with    Chronic systolic congestive heart failure     Problem List:  Remote paroxysmal atrial fibrillation with now chronic sustained atrial fibrillation and progressive sick sinus syndrome:  Diagnosed in [facility], in April 2014.   CHADS-VASc score of 4 with anticoagulation on Xarelto.  Successful cardioversion with restoration of sinus rhythm with advanced trifascicular block with CA interval of 292 msec in the setting of CRBB/LAHB, on 04/29/2014, by Dr. Slaughter.   Return of atrial fibrillation with Holter monitor.  Holter monitor for 48 hours with a minimum heart rate of 42 beats per minute and max of 138 beats per minute, with ventricular ectopy less than 1% of the time and supraventricular ectopy 2% of the time, 03/27/2015.  Echocardiogram showing estimated EF of 61% with mild-to-moderate LVH and left atrium slightly dilated, and right ventricle slightly dilated, and moderate aortic cuff sclerosis, and mild aortic regurgitation with mild mitral regurgitation, and mild-to-moderate tricuspid regurgitation.  Implantation of [employer] single-chamber permanent pacemaker on 07/13/2015 by Dr. Cano (can and leads are not MRI compatible), with acceptable interrogation and no reprogramming, April 2017, with subsequent acceptable remote check, August 2017, November 2018, with acceptable interrogation, January 2019, July 2019, July 2021.  Recent NYHA class II-III exertional dyspnea and fatigue without angina pectoris with abnormal echocardiogram (May 2019) and  subsequent EP assessment and discontinuation of Bystolic and stable abnormal echocardiogram (LVEF 0.39), September 2020  Residual class I symptoms, August 2019, April 2020, September 2020, July 2021, January 2022, July 2022  Echocardiogram 7/13/2022: LVEF 48%, LA volume moderately increased, RVSP less than 35 mmHg, mild dilation of the aortic root and of the sinuses of Valsalva present, the following left ventricular wall segments are hypokinetic: mid anterior, basal anterolateral, mid anterolateral, basal inferolateral, mid inferolateral, apical inferior, mid inferior, basal inferoseptal, mid inferoseptal, apex hypokinetic, mid anteroseptal, basal anterior, basal inferior and basal inferoseptal. The following left ventricular wall segments are akinetic: apical anterior, apical lateral and apical septal.  Normal RV cavity size, wall thickness, and systolic function noted.  Aortic valve exhibits mild to moderate sclerosis, and anterior apical left ventricular aneurysm is present.  Improved EF compared to September 2020 echocardiographic study report  Residual class I symptoms January 2023, July 2023, January 2024, July 2024, January 2025  Syncopal episodes secondary to bradycardia.   Hypertension.   Dyslipidemia; on statin therapy.   Diabetes mellitus type 2, hemoglobin  A1c 6.7%, July 2015; 6.6%, December 2018, 7.2% July 2024  Chronic right bundle branch block.   Chronic diastolic heart failure.  Mild obesity: BMI 32.01  Degenerative joint disease.   Ventral hernia.   Chronic kidney disease.   Central sleep apnea with the use of BiPAP.   Recent progressive dysphoria, weakness, exercise intolerance, and fatigue, off CPAP therapy, with recently completed polysomnogram and laboratory studies - data deficit, July 2019  Surgical history:  Appendectomy.   Pilonidal cyst.  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  Prostatectomy.   Pacemaker implantation  Cataracts    Allergies   Allergen Reactions    Bystolic  [Nebivolol Hcl] Other (See Comments)     Fatigue, Couldn't sleep        Current Outpatient Medications   Medication Instructions    aspirin 81 mg, Daily    carvedilol (COREG) 6.25 MG tablet TAKE 1 TABLET BY MOUTH TWICE  DAILY    cetirizine (ZYRTEC) 10 mg, As Needed    docusate sodium (COLACE) 100 mg, 2 Times Daily    fenofibrate 160 mg, Daily    metFORMIN ER (GLUCOPHAGE-XR) 500 mg, 2 times daily    pravastatin (PRAVACHOL) 40 mg, Every Night at Bedtime    sacubitril-valsartan (Entresto) 24-26 MG tablet 1 tablet, Oral, 2 Times Daily    Xarelto 15 MG tablet TAKE 1 TABLET BY MOUTH DAILY  WITH DINNER         HISTORY OF PRESENT ILLNESS:  Patient is here for 6-month follow-up.The patient had an echocardiogram before coming to our office today; this will be read, and a letter will be sent to the patient with those results.  Patient compliant with BiPAP.  However, he has not been using it the past few days because he just had right cataract surgery and he goes back next week for left cataract surgery.  Once he recovers from these surgeries he will go back to using his BiPAP.  He denies any chest pain, shortness of breath, palpitations, dizziness, presyncope, or syncope.  The patient stays active and says that he works out every day.  He got a stairstepper that he loves using.  He says that every time he walks past it that he will get on it for a few minutes.  Patient got a letter from his insurance company and thinks that he may not be a candidate for reduced pricing on Entresto anymore.      ROS   All other systems reviewed and otherwise negative.      ECG 12 Lead    Date/Time: 1/17/2025 9:06 AM  Performed by: Kathleen Mccray APRN    Authorized by: Kathleen Mccray APRN  Rhythm comments: Ventricular paced rhythm, abnormal ECG, 70 bpm,  ms, QTc 501 ms, no significant changes from last ECG January 2024             Objective:       Vitals:    01/17/25 0841   BP: 124/74   BP Location: Right arm   Patient Position:  "Sitting   Cuff Size: Adult   SpO2: 96%   Weight: 92.7 kg (204 lb 6.4 oz)   Height: 170.2 cm (67\")     Body mass index is 32.01 kg/m².  Wt Readings from Last 2 Encounters:   01/17/25 92.7 kg (204 lb 6.4 oz)   08/15/24 92.5 kg (204 lb)        Constitutional:       Appearance: Healthy appearance. Not in distress.   Neck:      Vascular: No JVR. JVD normal.   Pulmonary:      Effort: Pulmonary effort is normal.      Breath sounds: Normal breath sounds. No wheezing. No rhonchi. No rales.   Chest:      Chest wall: Not tender to palpatation.   Cardiovascular:      PMI at left midclavicular line. Normal rate. Regular rhythm. Normal S1. Normal S2.       Murmurs: There is a grade 1/6 systolic murmur at the LLSB.      No gallop.  No click. No rub.   Pulses:     Intact distal pulses.   Edema:     Peripheral edema absent.   Abdominal:      General: Bowel sounds are normal.      Palpations: Abdomen is soft.      Tenderness: There is no abdominal tenderness.   Musculoskeletal: Normal range of motion.         General: No tenderness. Skin:     General: Skin is warm and dry.   Neurological:      General: No focal deficit present.      Mental Status: Alert and oriented to person, place and time.           Lab Review:   Lab Results   Component Value Date    GLUCOSE 129 (H) 07/16/2024    BUN 16 07/16/2024    CREATININE 1.27 07/16/2024    BCR 12.6 07/16/2024    CO2 24.1 07/16/2024    CALCIUM 9.3 07/16/2024    ALBUMIN 3.9 07/16/2024    AST 15 07/16/2024    ALT 12 07/16/2024       Lab Results   Component Value Date    WBC 6.85 07/16/2024    HGB 14.3 07/16/2024    HCT 43.9 07/16/2024    MCV 86.6 07/16/2024     07/16/2024       Lab Results   Component Value Date    HGBA1C 7.20 (H) 07/16/2024       Lab Results   Component Value Date    TSH 2.160 07/16/2024       Lab Results   Component Value Date    CHOL 121 07/16/2024     Lab Results   Component Value Date    TRIG 75 07/16/2024     Lab Results   Component Value Date    HDL 44 07/16/2024 "     Lab Results   Component Value Date    LDL 62 07/16/2024           Lab Results   Component Value Date     (H) 07/12/2015                 Results for orders placed during the hospital encounter of 07/13/22    Adult Transthoracic Echo Complete w/ Color, Spectral and Contrast if necessary per protocol    Interpretation Summary  · Estimated left ventricular EF = 48% Estimated left ventricular EF was in agreement with the calculated left ventricular EF. Left ventricular ejection fraction appears to be 46 - 50%. Left ventricular systolic function is low normal.  · Left atrial volume is moderately increased.  · Estimated right ventricular systolic pressure from tricuspid regurgitation is normal (<35 mmHg).  · Mild dilation of the aortic root and of the sinuses of Valsalva present.  · The following left ventricular wall segments are hypokinetic: mid anterior, basal anterolateral, mid anterolateral, basal inferolateral, mid inferolateral, apical inferior, mid inferior, basal inferoseptal, mid inferoseptal, apex hypokinetic, mid anteroseptal, basal anterior, basal inferior and basal inferoseptal. The following left ventricular wall segments are akinetic: apical anterior, apical lateral and apical septal.  · Normal right ventricular cavity size, wall thickness and systolic function noted.  · The aortic valve exhibits mild-moderate sclerosis.  · No evidence of pulmonary hypertension is present.  · There is no evidence of pericardial effusion.  · An anterior apical left ventricular aneurysm is present.  · Improved systolic left ventricular function noted since 30 September 2020 echocardiographic study report (LVEF 0.39).     MURJ 10/16/2024: Battery longevity 2 years, 98% RV pacing       [employer] pacemaker interrogation 1/17/2025: Pacemaker dependent, 99% RV pacing, battery voltage around 2 years, 18 VHR episodes very brief (6-10 beats), no reprogramming    Advance Care Planning   ACP discussion was held with  the patient during this visit. Patient does not have an advance directive, declines further assistance.      Assessment:     Overall continued acceptable course with no new interim cardiopulmonary complaints with good functional status. We will defer additional diagnostic or therapeutic intervention from a cardiac perspective at this time. The patient had an echocardiogram before coming to our office today; this will be read, and a letter will be sent to the patient with those results.  Acceptable pacemaker interrogation in office today with no reprogramming.     Diagnosis Plan   1. Chronic systolic congestive heart failure  The patient had an echocardiogram before coming to our office today; this will be read, and a letter will be sent to the patient with those results.  Continue GDMT.      2. Primary hypertension  Controlled, continue current cardiac medications      3. Dyslipidemia  Good lipid panel July 2024, continue pravastatin   4.  Encounter for interrogation of pacemaker: Pacemaker interrogation acceptable with no reprogramming          Plan:         Patient to continue current medications and close follow up with the above providers.  Tentative cardiology follow up in July 2025 or patient may return sooner PRN.  The patient had an echocardiogram before coming to our office today; this will be read, and a letter will be sent to the patient with those results.   [facility] PPM check      Electronically signed by UGO Zaragoza, 01/17/25, 9:17 AM EST.

## 2025-01-17 ENCOUNTER — HOSPITAL ENCOUNTER (OUTPATIENT)
Dept: CARDIOLOGY | Facility: HOSPITAL | Age: 89
Discharge: HOME | End: 2025-01-17
Payer: MEDICARE

## 2025-01-17 ENCOUNTER — DOCUMENTATION (OUTPATIENT)
Dept: CARDIOLOGY | Facility: CLINIC | Age: 89
End: 2025-01-17

## 2025-01-17 ENCOUNTER — OFFICE VISIT (OUTPATIENT)
Dept: CARDIOLOGY | Facility: CLINIC | Age: 89
End: 2025-01-17
Payer: MEDICARE

## 2025-01-17 VITALS — BODY MASS INDEX: 32.08 KG/M2 | HEIGHT: 67 IN | WEIGHT: 204.37 LBS

## 2025-01-17 VITALS
HEIGHT: 67 IN | BODY MASS INDEX: 32.08 KG/M2 | WEIGHT: 204.4 LBS | DIASTOLIC BLOOD PRESSURE: 74 MMHG | SYSTOLIC BLOOD PRESSURE: 124 MMHG | OXYGEN SATURATION: 96 %

## 2025-01-17 DIAGNOSIS — I10 PRIMARY HYPERTENSION: ICD-10-CM

## 2025-01-17 DIAGNOSIS — I50.22 CHRONIC SYSTOLIC CONGESTIVE HEART FAILURE: Primary | ICD-10-CM

## 2025-01-17 DIAGNOSIS — Z45.018 ENCOUNTER FOR INTERROGATION OF CARDIAC PACEMAKER: ICD-10-CM

## 2025-01-17 DIAGNOSIS — E78.5 DYSLIPIDEMIA: ICD-10-CM

## 2025-01-17 LAB
ASCENDING AORTA: 3.6 CM
AV MEAN PRESS GRAD SYS DOP V1V2: 2.2 MMHG
AV VMAX SYS DOP: 90.5 CM/SEC
BH CV ECHO MEAS - AI P1/2T: 499 MSEC
BH CV ECHO MEAS - AO MAX PG: 3.3 MMHG
BH CV ECHO MEAS - AO ROOT DIAM: 4.5 CM
BH CV ECHO MEAS - AO V2 VTI: 17.1 CM
BH CV ECHO MEAS - IVS/LVPW: 1.08 CM
BH CV ECHO MEAS - IVSD: 1.3 CM
BH CV ECHO MEAS - LA DIMENSION: 4.7 CM
BH CV ECHO MEAS - LAT PEAK E' VEL: 13.9 CM/SEC
BH CV ECHO MEAS - LV MAX PG: 1.84 MMHG
BH CV ECHO MEAS - LV MEAN PG: 0.95 MMHG
BH CV ECHO MEAS - LV V1 MAX: 67.9 CM/SEC
BH CV ECHO MEAS - LV V1 VTI: 13.1 CM
BH CV ECHO MEAS - LVIDD: 4.9 CM
BH CV ECHO MEAS - LVIDS: 3.5 CM
BH CV ECHO MEAS - LVOT DIAM: 2 CM
BH CV ECHO MEAS - LVPWD: 1.2 CM
BH CV ECHO MEAS - MED PEAK E' VEL: 7.5 CM/SEC
BH CV ECHO MEAS - MV A MAX VEL: 44.8 CM/SEC
BH CV ECHO MEAS - MV DEC SLOPE: 204.9 CM/SEC2
BH CV ECHO MEAS - MV E MAX VEL: 69.1 CM/SEC
BH CV ECHO MEAS - MV E/A: 1.54
BH CV ECHO MEAS - MV MAX PG: 2.11 MMHG
BH CV ECHO MEAS - MV MEAN PG: 0.9 MMHG
BH CV ECHO MEAS - MV V2 VTI: 18.9 CM
BH CV ECHO MEAS - PA ACC TIME: 0.06 SEC
BH CV ECHO MEAS - PI END-D VEL: 122.4 CM/SEC
BH CV ECHO MEAS - RAP SYSTOLE: 3 MMHG
BH CV ECHO MEAS - RVSP: 30 MMHG
BH CV ECHO MEAS - TAPSE (>1.6): 1.34 CM
BH CV ECHO MEAS - TR MAX PG: 26.7 MMHG
BH CV ECHO MEAS - TR MAX VEL: 258.4 CM/SEC
BH CV ECHO MEASUREMENTS AVERAGE E/E' RATIO: 6.46
BH CV VAS BP RIGHT ARM: NORMAL MMHG
BH CV XLRA - RV BASE: 3.9 CM
BH CV XLRA - RV LENGTH: 6.9 CM
BH CV XLRA - RV MID: 2.8 CM
BH CV XLRA - TDI S': 9.5 CM/SEC
LEFT ATRIUM VOLUME INDEX: 39.6 ML/M2
LV EF BIPLANE MOD: 51.6 %

## 2025-01-17 PROCEDURE — 93306 TTE W/DOPPLER COMPLETE: CPT

## 2025-01-17 RX ORDER — SACUBITRIL AND VALSARTAN 24; 26 MG/1; MG/1
1 TABLET, FILM COATED ORAL 2 TIMES DAILY
Qty: 180 TABLET | Refills: 3 | Status: SHIPPED | OUTPATIENT
Start: 2025-01-17

## 2025-01-17 NOTE — PROGRESS NOTES
I was able to review the patient's echocardiogram results.  Ejection fraction was normal (51-55%).  Patient's aortic root was mildly dilated at 4.5 cm.  Mitral regurgitation was better than previous echocardiogram.  Ejection fraction today was better than prior echocardiogram in 2022 (48%).  Aortic root measured 4.2 cm in 2022.  I will continue to follow this closely.  I would continue current cardiac medications. I will have SHERMAN Sexton call the patient back with results/recommendations.     Electronically signed by UGO Zaragoza, 01/17/25, 12:43 PM EST.

## 2025-01-20 ENCOUNTER — TELEPHONE (OUTPATIENT)
Dept: CARDIOLOGY | Facility: CLINIC | Age: 89
End: 2025-01-20
Payer: MEDICARE

## 2025-01-20 RX ORDER — RIVAROXABAN 15 MG/1
TABLET, FILM COATED ORAL
Qty: 90 TABLET | Refills: 3 | Status: SHIPPED | OUTPATIENT
Start: 2025-01-20

## 2025-01-20 NOTE — TELEPHONE ENCOUNTER
Called and spoke with pts wife regarding results of recent echo    I was able to review the patient's echocardiogram results.  Ejection fraction was normal (51-55%).  Patient's aortic root was mildly dilated at 4.5 cm.  Mitral regurgitation was better than previous echocardiogram.  Ejection fraction today was better than prior echocardiogram in 2022 (48%).  Aortic root measured 4.2 cm in 2022.  I will continue to follow this closely.  I would continue current cardiac medications. I will have SHERMAN Sexton call the patient back with results/recommendations.     Pts wife was agreeable and verbalized understanding

## 2025-02-05 RX ORDER — CARVEDILOL 6.25 MG/1
6.25 TABLET ORAL EVERY 12 HOURS SCHEDULED
Qty: 180 TABLET | Refills: 3 | Status: SHIPPED | OUTPATIENT
Start: 2025-02-05

## 2025-07-16 LAB
MC_CV_MDC_IDC_RATE_1: 160
MC_CV_MDC_IDC_ZONE_ID: 1
MDC_IDC_MSMT_BATTERY_REMAINING_LONGEVITY: 3 MO
MDC_IDC_MSMT_BATTERY_REMAINING_PERCENTAGE: 3 %
MDC_IDC_MSMT_BATTERY_STATUS: NORMAL
MDC_IDC_MSMT_LEADCHNL_RV_DTM: NORMAL
MDC_IDC_MSMT_LEADCHNL_RV_IMPEDANCE_VALUE: 538
MDC_IDC_MSMT_LEADCHNL_RV_PACING_THRESHOLD_POLARITY: NORMAL
MDC_IDC_PG_IMPLANT_DTM: NORMAL
MDC_IDC_PG_MFG: NORMAL
MDC_IDC_PG_MODEL: NORMAL
MDC_IDC_PG_SERIAL: NORMAL
MDC_IDC_PG_TYPE: NORMAL
MDC_IDC_SESS_DTM: NORMAL
MDC_IDC_SESS_TYPE: NORMAL
MDC_IDC_SET_BRADY_LOWRATE: 70
MDC_IDC_SET_BRADY_MAX_SENSOR_RATE: 120
MDC_IDC_SET_BRADY_MODE: NORMAL
MDC_IDC_SET_LEADCHNL_RV_PACING_AMPLITUDE: 2
MDC_IDC_SET_LEADCHNL_RV_PACING_POLARITY: NORMAL
MDC_IDC_SET_LEADCHNL_RV_PACING_PULSEWIDTH: 0.4
MDC_IDC_SET_LEADCHNL_RV_SENSING_POLARITY: NORMAL
MDC_IDC_SET_LEADCHNL_RV_SENSING_SENSITIVITY: 0.6
MDC_IDC_SET_ZONE_STATUS: NORMAL
MDC_IDC_SET_ZONE_TYPE: NORMAL
MDC_IDC_STAT_BRADY_RV_PERCENT_PACED: 100

## 2025-07-17 ENCOUNTER — TELEPHONE (OUTPATIENT)
Dept: CARDIOLOGY | Facility: CLINIC | Age: 89
End: 2025-07-17
Payer: MEDICARE

## 2025-07-18 ENCOUNTER — TELEPHONE (OUTPATIENT)
Dept: CARDIOLOGY | Facility: CLINIC | Age: 89
End: 2025-07-18

## 2025-07-21 ENCOUNTER — TELEPHONE (OUTPATIENT)
Dept: CARDIOLOGY | Facility: CLINIC | Age: 89
End: 2025-07-21

## 2025-08-04 RX ORDER — SACUBITRIL AND VALSARTAN 24; 26 MG/1; MG/1
1 TABLET, FILM COATED ORAL 2 TIMES DAILY
Qty: 180 TABLET | Refills: 1 | Status: SHIPPED | OUTPATIENT
Start: 2025-08-04

## 2025-08-06 ENCOUNTER — TELEPHONE (OUTPATIENT)
Dept: CARDIOLOGY | Facility: CLINIC | Age: 89
End: 2025-08-06
Payer: MEDICARE

## 2025-08-07 ENCOUNTER — OFFICE VISIT (OUTPATIENT)
Dept: CARDIOLOGY | Facility: CLINIC | Age: 89
End: 2025-08-07
Payer: MEDICARE

## 2025-08-07 VITALS
HEIGHT: 70 IN | DIASTOLIC BLOOD PRESSURE: 60 MMHG | WEIGHT: 198 LBS | SYSTOLIC BLOOD PRESSURE: 122 MMHG | BODY MASS INDEX: 28.35 KG/M2 | OXYGEN SATURATION: 99 % | HEART RATE: 72 BPM

## 2025-08-07 DIAGNOSIS — E78.5 DYSLIPIDEMIA: ICD-10-CM

## 2025-08-07 DIAGNOSIS — I49.5 SICK SINUS SYNDROME: ICD-10-CM

## 2025-08-07 DIAGNOSIS — I50.22 CHRONIC SYSTOLIC CONGESTIVE HEART FAILURE: Primary | ICD-10-CM

## 2025-08-07 DIAGNOSIS — I10 PRIMARY HYPERTENSION: ICD-10-CM

## 2025-08-13 ENCOUNTER — TELEPHONE (OUTPATIENT)
Dept: CARDIOLOGY | Facility: CLINIC | Age: 89
End: 2025-08-13
Payer: MEDICARE

## 2025-08-13 DIAGNOSIS — I48.91 ATRIAL FIBRILLATION, UNSPECIFIED TYPE: Primary | ICD-10-CM

## 2025-08-13 DIAGNOSIS — I10 PRIMARY HYPERTENSION: ICD-10-CM

## 2025-08-14 ENCOUNTER — OFFICE VISIT (OUTPATIENT)
Dept: CARDIOLOGY | Facility: CLINIC | Age: 89
End: 2025-08-14
Payer: MEDICARE

## 2025-08-14 ENCOUNTER — PATIENT ROUNDING (BHMG ONLY) (OUTPATIENT)
Dept: CARDIOLOGY | Facility: CLINIC | Age: 89
End: 2025-08-14
Payer: MEDICARE

## 2025-08-14 VITALS
HEART RATE: 73 BPM | HEIGHT: 70 IN | WEIGHT: 199 LBS | DIASTOLIC BLOOD PRESSURE: 80 MMHG | BODY MASS INDEX: 28.49 KG/M2 | SYSTOLIC BLOOD PRESSURE: 138 MMHG | OXYGEN SATURATION: 97 %

## 2025-08-14 DIAGNOSIS — G47.31 CENTRAL SLEEP APNEA: Primary | ICD-10-CM

## 2025-08-14 PROCEDURE — 99213 OFFICE O/P EST LOW 20 MIN: CPT | Performed by: NURSE PRACTITIONER

## 2025-08-14 RX ORDER — SACUBITRIL AND VALSARTAN 49; 51 MG/1; MG/1
1 TABLET, FILM COATED ORAL 2 TIMES DAILY
Qty: 60 TABLET | Refills: 6 | Status: SHIPPED | OUTPATIENT
Start: 2025-08-14 | End: 2025-08-14 | Stop reason: SDUPTHER

## 2025-08-14 RX ORDER — SACUBITRIL AND VALSARTAN 49; 51 MG/1; MG/1
1 TABLET, FILM COATED ORAL 2 TIMES DAILY
Qty: 60 TABLET | Refills: 6 | Status: SHIPPED | OUTPATIENT
Start: 2025-08-14

## 2025-08-19 LAB
MC_CV_MDC_IDC_RATE_1: 160
MC_CV_MDC_IDC_ZONE_ID: 1
MDC_IDC_MSMT_BATTERY_REMAINING_LONGEVITY: 3 MO
MDC_IDC_MSMT_BATTERY_REMAINING_PERCENTAGE: 3 %
MDC_IDC_MSMT_BATTERY_STATUS: NORMAL
MDC_IDC_MSMT_LEADCHNL_RV_DTM: NORMAL
MDC_IDC_MSMT_LEADCHNL_RV_IMPEDANCE_VALUE: 543
MDC_IDC_MSMT_LEADCHNL_RV_PACING_THRESHOLD_POLARITY: NORMAL
MDC_IDC_MSMT_LEADCHNL_RV_SENSING_INTR_AMPL: 25
MDC_IDC_PG_IMPLANT_DTM: NORMAL
MDC_IDC_PG_MFG: NORMAL
MDC_IDC_PG_MODEL: NORMAL
MDC_IDC_PG_SERIAL: NORMAL
MDC_IDC_PG_TYPE: NORMAL
MDC_IDC_SESS_DTM: NORMAL
MDC_IDC_SESS_TYPE: NORMAL
MDC_IDC_SET_BRADY_LOWRATE: 70
MDC_IDC_SET_BRADY_MAX_SENSOR_RATE: 120
MDC_IDC_SET_BRADY_MODE: NORMAL
MDC_IDC_SET_LEADCHNL_RV_PACING_AMPLITUDE: 2
MDC_IDC_SET_LEADCHNL_RV_PACING_POLARITY: NORMAL
MDC_IDC_SET_LEADCHNL_RV_PACING_PULSEWIDTH: 0.4
MDC_IDC_SET_LEADCHNL_RV_SENSING_POLARITY: NORMAL
MDC_IDC_SET_LEADCHNL_RV_SENSING_SENSITIVITY: 0.6
MDC_IDC_SET_ZONE_STATUS: NORMAL
MDC_IDC_SET_ZONE_TYPE: NORMAL
MDC_IDC_STAT_BRADY_RV_PERCENT_PACED: 100

## 2025-08-27 LAB
MC_CV_MDC_IDC_RATE_1: 160
MC_CV_MDC_IDC_ZONE_ID: 1
MDC_IDC_MSMT_BATTERY_REMAINING_LONGEVITY: 3 MO
MDC_IDC_MSMT_BATTERY_REMAINING_PERCENTAGE: 3 %
MDC_IDC_MSMT_BATTERY_STATUS: NORMAL
MDC_IDC_MSMT_LEADCHNL_RV_DTM: NORMAL
MDC_IDC_MSMT_LEADCHNL_RV_IMPEDANCE_VALUE: 543
MDC_IDC_MSMT_LEADCHNL_RV_PACING_THRESHOLD_POLARITY: NORMAL
MDC_IDC_MSMT_LEADCHNL_RV_SENSING_INTR_AMPL: 25
MDC_IDC_PG_IMPLANT_DTM: NORMAL
MDC_IDC_PG_MFG: NORMAL
MDC_IDC_PG_MODEL: NORMAL
MDC_IDC_PG_SERIAL: NORMAL
MDC_IDC_PG_TYPE: NORMAL
MDC_IDC_SESS_DTM: NORMAL
MDC_IDC_SESS_TYPE: NORMAL
MDC_IDC_SET_BRADY_LOWRATE: 70
MDC_IDC_SET_BRADY_MAX_SENSOR_RATE: 120
MDC_IDC_SET_BRADY_MODE: NORMAL
MDC_IDC_SET_LEADCHNL_RV_PACING_AMPLITUDE: 2
MDC_IDC_SET_LEADCHNL_RV_PACING_POLARITY: NORMAL
MDC_IDC_SET_LEADCHNL_RV_PACING_PULSEWIDTH: 0.4
MDC_IDC_SET_LEADCHNL_RV_SENSING_POLARITY: NORMAL
MDC_IDC_SET_LEADCHNL_RV_SENSING_SENSITIVITY: 0.6
MDC_IDC_SET_ZONE_STATUS: NORMAL
MDC_IDC_SET_ZONE_TYPE: NORMAL
MDC_IDC_STAT_BRADY_RV_PERCENT_PACED: 100

## (undated) DEVICE — (D)GLOVE EXAM LG NITRL NS -- DISC BY MFR NO SUB

## (undated) DEVICE — FLUFF AND POLYMER UNDERPAD,EXTRA HEAVY: Brand: WINGS

## (undated) DEVICE — GOWN ISOL IMPERV UNIV, DISP, OPEN BACK, BLUE --

## (undated) DEVICE — SYRINGE MED 25GA 3ML L5/8IN SUBQ PLAS W/ DETACH NDL SFTY

## (undated) DEVICE — SYR 50ML SLIP TIP NSAF LF STRL --

## (undated) DEVICE — FORCEPS BX L240CM JAW DIA2.8MM L CAP W/ NDL MIC MESH TOOTH

## (undated) DEVICE — CATHETER SUCT TR FL TIP 14FR W/ O CTRL

## (undated) DEVICE — ENDOSCOPY PUMP TUBING/ CAP SET: Brand: ERBE

## (undated) DEVICE — TRAP SPEC COLL POLYP POLYSTYR --

## (undated) DEVICE — GAUZE SPONGES,16 PLY: Brand: CURITY

## (undated) DEVICE — SOLUTION IRRIG 1000ML H2O STRL BLT

## (undated) DEVICE — MEDI-VAC SUCTION HIGH CAPACITY: Brand: CARDINAL HEALTH

## (undated) DEVICE — (D)SYR 10ML 1/5ML GRAD NSAF -- PKGING CHANGE USE ITEM 338027

## (undated) DEVICE — FLEX ADVANTAGE 3000CC: Brand: FLEX ADVANTAGE

## (undated) DEVICE — MEDI-VAC NON-CONDUCTIVE SUCTION TUBING: Brand: CARDINAL HEALTH

## (undated) DEVICE — SYRINGE MED 20ML STD CLR PLAS LUERLOCK TIP N CTRL DISP

## (undated) DEVICE — CLIP HEMO ENDOSCP 235CM BX/10 -- RESOLUTION 360

## (undated) DEVICE — CANNULA ORIG TL CLR W FOAM CUSHIONS AND 14FT SUPL TB 3 CHN

## (undated) DEVICE — AIRLIFE™ NASAL OXYGEN CANNULA CURVED, NONFLARED TIP WITH 14 FOOT (4.3 M) CRUSH-RESISTANT TUBING, OVER-THE-EAR STYLE: Brand: AIRLIFE™

## (undated) DEVICE — SNARE ENDOSCP XL W33MMXL240CM SHTH DIA2.4MM COLON STIFF

## (undated) DEVICE — SNARE ENDO 2.4MMX230CM -- COLD EXACTO